# Patient Record
Sex: MALE | Race: WHITE | NOT HISPANIC OR LATINO | Employment: FULL TIME | URBAN - METROPOLITAN AREA
[De-identification: names, ages, dates, MRNs, and addresses within clinical notes are randomized per-mention and may not be internally consistent; named-entity substitution may affect disease eponyms.]

---

## 2018-06-05 ENCOUNTER — TELEPHONE (OUTPATIENT)
Dept: GASTROENTEROLOGY | Facility: CLINIC | Age: 48
End: 2018-06-05

## 2018-06-05 NOTE — TELEPHONE ENCOUNTER
----- Message from Sammi Schulte sent at 6/5/2018 11:33 AM CDT -----  Contact: pt can be reached at [phone]  Pt is calling to speak with the nurse to request an appt for Gastro.  Pt has side and stomach pain, once pt eat feel better then later pain.  Pt is having sharp pain.    Please give pt a call back      Thank you!

## 2018-06-06 ENCOUNTER — OFFICE VISIT (OUTPATIENT)
Dept: GASTROENTEROLOGY | Facility: CLINIC | Age: 48
End: 2018-06-06
Payer: COMMERCIAL

## 2018-06-06 VITALS
DIASTOLIC BLOOD PRESSURE: 85 MMHG | HEART RATE: 61 BPM | WEIGHT: 198.88 LBS | BODY MASS INDEX: 29.46 KG/M2 | SYSTOLIC BLOOD PRESSURE: 125 MMHG | HEIGHT: 69 IN

## 2018-06-06 DIAGNOSIS — R10.84 ABDOMINAL PAIN, GENERALIZED: Primary | ICD-10-CM

## 2018-06-06 DIAGNOSIS — R11.0 NAUSEA: ICD-10-CM

## 2018-06-06 PROCEDURE — 3008F BODY MASS INDEX DOCD: CPT | Mod: CPTII,S$GLB,, | Performed by: NURSE PRACTITIONER

## 2018-06-06 PROCEDURE — 99204 OFFICE O/P NEW MOD 45 MIN: CPT | Mod: S$GLB,,, | Performed by: NURSE PRACTITIONER

## 2018-06-06 PROCEDURE — 99999 PR PBB SHADOW E&M-EST. PATIENT-LVL III: CPT | Mod: PBBFAC,,, | Performed by: NURSE PRACTITIONER

## 2018-06-06 RX ORDER — PANTOPRAZOLE SODIUM 40 MG/1
TABLET, DELAYED RELEASE ORAL
Refills: 5 | COMMUNITY
Start: 2018-04-28 | End: 2018-11-13

## 2018-06-06 RX ORDER — DICYCLOMINE HYDROCHLORIDE 10 MG/1
10 CAPSULE ORAL
Qty: 90 CAPSULE | Refills: 3 | Status: SHIPPED | OUTPATIENT
Start: 2018-06-06 | End: 2018-11-13

## 2018-06-06 NOTE — PROGRESS NOTES
[facility] Note    Reason for Visit:  Gastroesophageal reflux    PCP: Latanya Heard     Referring MD: Kacey DAI [facility] [address] / [place] [address]    HPI:  This is a 47 y.o. male here for 2 nd opinon for abdominal pain.  [facility]  for the [facility]. Pt reports extensive testing unrevealing. No records to view.  Also seen in [facility] for abd pain by KAILEE Gallego in 2013. EGD and abd us were unrevealing at that time.     Abdominal pain   ONSET:2013  LOCATION:epigastric, right side  DURATION:daily; constant  CHARACTER:ache; sharp, bloating  ASSOCIATED/ALLEVIATING/AGGRAVAITING:+ nausea. No vomiting. No fevers. Sometimes worse after meals.  pantoprazole 40 mg daily lost efficacy. H/o of omeprazole 40 mg daily-lost efficacy  RADIATION:to various areas of abd  TEMPORAL:worse during immediately after meals  SEVERITY:usually 5/10. Can increase to 8/10 at times    Reflux-rare regur/pyrosis  Bowel habits - 1 bristol type 4 BM/day taking metamucil once daily x 1 year. BM few days weekly with straining if not taking metamucil.  GI bleeding - denies hematochezia, hematemesis, melena, BRBPR, black/tarry stools, and coffee ground emesis    ROS:  Constitutional: No fevers, no chills, No unintentional weight loss, + fatigue,   ENT: No allergies  CV: No chest pain, no palpitations, no perif. edema, no sob on exertion  Pulm: No cough, No shortness of breath, no wheezes, no sputum  Ophtho: No vision changes  GI: see HPI;   Derm: No rash  Heme: No lymphadenopathy, No bruising  MSK: No arthritis, no muscle pain, no muscle weakness  : No dysuria, No hematuria  Endo: No hot or cold intolerance  Neuro: No syncope, No seizure,     Medical History:  has a past medical history of GERD (gastroesophageal reflux disease).    Surgical History:  has a past surgical history that includes Cholecystectomy.    Family History: family history includes Heart disease in his father..     Social  "History:  reports that he has quit smoking. He quit smokeless tobacco use about 2 weeks ago. He reports that he drinks alcohol. He reports that he does not use drugs.    Review of patient's allergies indicates:  No Known Allergies    Current Outpatient Prescriptions   Medication Sig    pantoprazole (PROTONIX) 40 MG tablet TK 1 T PO QD    psyllium (METAMUCIL) powder Take 1 packet by mouth 3 (three) times daily.    co-enzyme Q-10 50 mg capsule Take 50 mg by mouth once daily.    dicyclomine (BENTYL) 10 MG capsule Take 1 capsule (10 mg total) by mouth before meals as needed (TID PRN).    fish oil-omega-3 fatty acids 300-1,000 mg capsule Take 2 g by mouth once daily.    multivitamin capsule Take 1 capsule by mouth once daily.     No current facility-administered medications for this visit.        Objective Findings:    Vital Signs:  /85   Pulse 61   Ht 5' 9" (1.753 m)   Wt 90.2 kg (198 lb 13.7 oz)   BMI 29.37 kg/m²   Body mass index is 29.37 kg/m².    Physical Exam:  General Appearance: Well appearing in no acute distress  Head:   Normocephalic, without obvious abnormality  Eyes:    No scleral icterus, EOMI  ENT: Neck supple, Lips, mucosa, and tongue normal; teeth and gums normal  Lungs: CTA bilaterally in anterior and posterior fields, no wheezes, no crackles.  Heart:  Regular rate and rhythm, S1, S2 normal, no murmurs heard  Abdomen: Soft,mild epigastric tenderness, non distended with positive bowel sounds in all four quadrants. No hepatosplenomegaly, ascites, or mass  Extremities: 2+ radial pulses, no clubbing, cyanosis or edema  Skin: No rash to exposed areas  Neurologic: A&Ox4      Labs:  Lab Results   Component Value Date    WBC 5.70 11/12/2013    HGB 15.2 11/12/2013    HCT 42.1 11/12/2013     11/12/2013    ALT 32 11/12/2013    AST 18 11/12/2013     11/12/2013    K 4.0 11/12/2013     11/12/2013    CREATININE 1.0 11/12/2013    BUN 16 11/12/2013    CO2 27 11/12/2013 "     Imaging:  abd us 11/12/13: fatty liver.    Endoscopy:    EGD 11/20/13: esophageal mucosal changes suspicious for EOE (-). Nl stomach, nl duodenum.   [facility]  Assessment:  1. Abdominal pain, generalized    2. Nausea           Recommendations:  1. abd pain-record release signed for previous GI work up. Start 6 small, frequent meals daily (GP diet) as per handout provided. Try Bentyl 10 mg TID PRN. Pt cautioned re potential s/e decreased sweating with bentyl use. Skip does if going outside for 1 hr or more. Understanding verbalized.   2. Nausea-recs as above.    To GI MD for 2 nd opinion.     Follow-up for 2 nd opinon abd pain with GI MD provider.      Order summary:  Orders Placed This Encounter    dicyclomine (BENTYL) 10 MG capsule         Thank you so much for allowing me to participate in the care of Lola Vicente, APRN,FNP-C

## 2018-06-06 NOTE — LETTER
June 6, 2018      Latanya Heard MD  [address]  [address]  [facility] [address]           [facility]  [address]  [place] [address]  Phone: [phone]  Fax: [phone]          Patient: Lola Stevens Jr.   MR Number: [id number]   YOB: 1970   Date of Visit: 6/6/2018       Dear Dr. Latanya Heard:    Thank you for referring Lola Stevens to me for evaluation. Attached you will find relevant portions of my assessment and plan of care.    If you have questions, please do not hesitate to call me. I look forward to following Lola Stevens along with you.    Sincerely,    Asha Vicente, KAILEE    Enclosure  CC:  No Recipients    If you would like to receive this communication electronically, please contact [email or web address] or [phone] to request more information on "[employer] Link access.    For providers and/or their staff who would like to refer a patient to [facility], please contact us through our one-stop-shop provider referral line, [facility] [place], at [phone].    If you feel you have received this communication in error or would no longer like to receive these types of communications, please e-mail [email or web address]

## 2018-06-08 ENCOUNTER — TELEPHONE (OUTPATIENT)
Dept: GASTROENTEROLOGY | Facility: CLINIC | Age: 48
End: 2018-06-08

## 2018-06-08 NOTE — TELEPHONE ENCOUNTER
----- Message from Janet Longoria MA sent at 6/8/2018  1:31 PM CDT -----  Contact: simone kraft ceci Monterroso [phone]  Asha Vicente's pt  ----- Message -----  From: Eda Char  Sent: 6/8/2018  12:19 PM  To: [facility] Clinical Staff    terri villarreal appt on 8/9 [facility] is waiting for you to request his records from [facility] signed release form here for you to ask for them - please call wife Loc kraft ceci Monterroso [phone]

## 2018-06-11 ENCOUNTER — TELEPHONE (OUTPATIENT)
Dept: GASTROENTEROLOGY | Facility: CLINIC | Age: 48
End: 2018-06-11

## 2018-06-11 NOTE — TELEPHONE ENCOUNTER
----- Message from Tanika Aguiar MA sent at 6/11/2018 11:10 AM CDT -----  Contact: Home: [phone]  Wife /Fartun  Needs Advice    Reason for call:  Pt received an apt confirmation to see Dr Ramírez on 8/9 but he was told an appt could not be made till  his records from dr salcedo at [facility] were received. Asking if his records were received.     Please contact Home: [phone]  Wife /Fartun

## 2018-06-12 ENCOUNTER — TELEPHONE (OUTPATIENT)
Dept: GASTROENTEROLOGY | Facility: CLINIC | Age: 48
End: 2018-06-12

## 2018-06-12 NOTE — TELEPHONE ENCOUNTER
----- Message from Betsy Ramírez MD sent at 6/11/2018  4:44 PM CDT -----  Contact: Home: [phone]  Wife /Fartun  Have then request them again  ----- Message -----  From: Sabina Barbosa MA  Sent: 6/11/2018   3:55 PM  To: Betsy Ramírez MD    I don't have any records.  ----- Message -----  From: Betsy Ramírez MD  Sent: 6/11/2018  12:10 PM  To: KALEY John    Please check if records have been received. I haven't gotten any records.    Betsy  ----- Message -----  From: Sabina Barbosa MA  Sent: 6/11/2018  11:21 AM  To: Betsy Ramírez MD        ----- Message -----  From: Tanika Aguiar MA  Sent: 6/11/2018  11:10 AM  To: [facility]rell Meyer Staff    Needs Advice    Reason for call:  Pt received an apt confirmation to see Dr Ramírez on 8/9 but he was told an appt could not be made till  his records from dr salcedo at [facility] were received. Asking if his records were received.     Please contact Home: [phone]  Wife /Fartun

## 2018-06-12 NOTE — TELEPHONE ENCOUNTER
Tried several times to reach patient. Phone was not accepting calls. Release for medical records faxed to Dr Alex Sandhu's office.

## 2018-06-13 ENCOUNTER — TELEPHONE (OUTPATIENT)
Dept: GASTROENTEROLOGY | Facility: CLINIC | Age: 48
End: 2018-06-13

## 2018-06-13 NOTE — TELEPHONE ENCOUNTER
----- Message from Betsy Ramírez MD sent at 6/13/2018 10:41 AM CDT -----  Contact: PT#[phone]  Sabina    I have never seen this man, I am not going to call him prior to his appointment to see what is going on and give a plan over the phone.    If a patient is new to me, I first need to see him.    We can make sure his appt is early so he can go to lab afterwards.    Betsy Ramírez M.D.  Gastroenterology Fellow, PGY-IV  Pager: [phone]  [facility]    ----- Message -----  From: Sabina Barbosa MA  Sent: 6/13/2018  10:37 AM  To: Betsy Ramírez MD        ----- Message -----  From: Dayan Srinivasan  Sent: 6/13/2018  10:30 AM  To: [facility]rell Meyer Staff    Needs Advice    Reason for call:    Pt wants to know if he can get labs done before appt. He states that labs from previ doctor is going to be a year old  Communication Preference:callback  Additional Information:

## 2018-06-13 NOTE — TELEPHONE ENCOUNTER
----- Message from Dayan Srinivasan sent at 6/13/2018 10:30 AM CDT -----  Contact: PT#[phone]  Needs Advice    Reason for call:    Pt wants to know if he can get labs done before appt. He states that labs from previ doctor is going to be a year old  Communication Preference:callback  Additional Information:

## 2018-06-26 ENCOUNTER — TELEPHONE (OUTPATIENT)
Dept: GASTROENTEROLOGY | Facility: CLINIC | Age: 48
End: 2018-06-26

## 2018-06-26 NOTE — TELEPHONE ENCOUNTER
----- Message from Curt Leon sent at 6/19/2018  9:50 AM CDT -----  Contact: Pt wife:[phone]  .Needs Advice    Reason for call:Pt wife called and states she would like to speak with the nurse in regards to the nurse giving her a call when the pt employer sends information by fax.  Communication Preference:Telephone  Additional Information:

## 2018-08-09 ENCOUNTER — OFFICE VISIT (OUTPATIENT)
Dept: GASTROENTEROLOGY | Facility: CLINIC | Age: 48
End: 2018-08-09
Payer: COMMERCIAL

## 2018-08-09 ENCOUNTER — LAB VISIT (OUTPATIENT)
Dept: LAB | Facility: HOSPITAL | Age: 48
End: 2018-08-09
Payer: COMMERCIAL

## 2018-08-09 VITALS
WEIGHT: 207.44 LBS | DIASTOLIC BLOOD PRESSURE: 82 MMHG | HEIGHT: 69 IN | TEMPERATURE: 64 F | SYSTOLIC BLOOD PRESSURE: 128 MMHG | BODY MASS INDEX: 30.72 KG/M2

## 2018-08-09 DIAGNOSIS — K21.9 GASTROESOPHAGEAL REFLUX DISEASE WITHOUT ESOPHAGITIS: ICD-10-CM

## 2018-08-09 DIAGNOSIS — R10.84 GENERALIZED ABDOMINAL PAIN: ICD-10-CM

## 2018-08-09 DIAGNOSIS — R10.84 GENERALIZED ABDOMINAL PAIN: Primary | ICD-10-CM

## 2018-08-09 DIAGNOSIS — R19.7 DIARRHEA, UNSPECIFIED TYPE: ICD-10-CM

## 2018-08-09 LAB
ALBUMIN SERPL BCP-MCNC: 4 G/DL
ALP SERPL-CCNC: 56 U/L
ALT SERPL W/O P-5'-P-CCNC: 42 U/L
ANION GAP SERPL CALC-SCNC: 9 MMOL/L
AST SERPL-CCNC: 26 U/L
BASOPHILS # BLD AUTO: 0.03 K/UL
BASOPHILS NFR BLD: 0.5 %
BILIRUB SERPL-MCNC: 0.4 MG/DL
BUN SERPL-MCNC: 22 MG/DL
CALCIUM SERPL-MCNC: 10.1 MG/DL
CHLORIDE SERPL-SCNC: 105 MMOL/L
CO2 SERPL-SCNC: 27 MMOL/L
CREAT SERPL-MCNC: 0.9 MG/DL
DIFFERENTIAL METHOD: NORMAL
EOSINOPHIL # BLD AUTO: 0.2 K/UL
EOSINOPHIL NFR BLD: 3 %
ERYTHROCYTE [DISTWIDTH] IN BLOOD BY AUTOMATED COUNT: 12.3 %
EST. GFR  (AFRICAN AMERICAN): >60 ML/MIN/1.73 M^2
EST. GFR  (NON AFRICAN AMERICAN): >60 ML/MIN/1.73 M^2
GLUCOSE SERPL-MCNC: 87 MG/DL
HCT VFR BLD AUTO: 45.5 %
HGB BLD-MCNC: 15.6 G/DL
IMM GRANULOCYTES # BLD AUTO: 0.01 K/UL
IMM GRANULOCYTES NFR BLD AUTO: 0.2 %
LYMPHOCYTES # BLD AUTO: 2.1 K/UL
LYMPHOCYTES NFR BLD: 34.2 %
MCH RBC QN AUTO: 30.2 PG
MCHC RBC AUTO-ENTMCNC: 34.3 G/DL
MCV RBC AUTO: 88 FL
MONOCYTES # BLD AUTO: 0.5 K/UL
MONOCYTES NFR BLD: 7.5 %
NEUTROPHILS # BLD AUTO: 3.4 K/UL
NEUTROPHILS NFR BLD: 54.6 %
NRBC BLD-RTO: 0 /100 WBC
PLATELET # BLD AUTO: 231 K/UL
PMV BLD AUTO: 9.6 FL
POTASSIUM SERPL-SCNC: 4 MMOL/L
PROT SERPL-MCNC: 7.3 G/DL
RBC # BLD AUTO: 5.17 M/UL
SODIUM SERPL-SCNC: 141 MMOL/L
WBC # BLD AUTO: 6.25 K/UL

## 2018-08-09 PROCEDURE — 3008F BODY MASS INDEX DOCD: CPT | Mod: CPTII,S$GLB,, | Performed by: STUDENT IN AN ORGANIZED HEALTH CARE EDUCATION/TRAINING PROGRAM

## 2018-08-09 PROCEDURE — 99999 PR PBB SHADOW E&M-EST. PATIENT-LVL III: CPT | Mod: PBBFAC,,, | Performed by: STUDENT IN AN ORGANIZED HEALTH CARE EDUCATION/TRAINING PROGRAM

## 2018-08-09 PROCEDURE — 99213 OFFICE O/P EST LOW 20 MIN: CPT | Mod: S$GLB,,, | Performed by: STUDENT IN AN ORGANIZED HEALTH CARE EDUCATION/TRAINING PROGRAM

## 2018-08-09 PROCEDURE — 85025 COMPLETE CBC W/AUTO DIFF WBC: CPT

## 2018-08-09 PROCEDURE — 36415 COLL VENOUS BLD VENIPUNCTURE: CPT

## 2018-08-09 PROCEDURE — 80053 COMPREHEN METABOLIC PANEL: CPT

## 2018-08-09 NOTE — PATIENT INSTRUCTIONS
--Obtain labs (CMP and CBC)    --Obtain EGD and colonoscopy    --Obtain CT abdomen and pelvis    --Continue PPI (you should take it ~ 45 minutes before breakfast)    --Follow up in 3 months    Betsy Ramírez M.D.  Gastroenterology Fellow, PGY-V  [facility]

## 2018-08-09 NOTE — PROGRESS NOTES
"     Gastroenterology Clinic    Reason for visit: The primary encounter diagnosis was Generalized abdominal pain. Diagnoses of Diarrhea, unspecified type and Gastroesophageal reflux disease without esophagitis were also pertinent to this visit.  Referring provider/PCP: Latanya Heard MD    HPI:  47 year old male ex-smoker with a history of GERD and abdominal pain who presents to clinic for a 2nd opinion as he feels his symptoms are getting worse/no diagnosis has been given.    Patient has been seen in our GI clinic by Ms. Asha Dhillon. He is new to me today.  He presents with his wife and has a notebook with 2 pages of complaints (GI complaints covered first and additional complaints in ROS).  Patient has had intermittent episodes of abdominal pain since 2013. He had a cholecystectomy however symptoms did not improve. He states that without warning he can have pain under his left rib, right rib, and mid-abdomen which he describes as stabbing/burning sensation associated with this "stomach blowing up". He also feels "gassy" but states he is not able to burp or pass flatus. Also complains of hunger pains which drive him to eat to see if pain resolves however upon eating gets full right away. Furthermore he states that when he has red sauce or pork he feels a knot in the epigastric region which is relieved with tums and H2 blocker. He is on a daily PPI which he takes with breakfast but he believes it is not helping as much. Lastly he states his "bowel movements are all over the place". One day he can have skinny light color stools and the next day he can have diarrhea. However here denies any melena or hematochezia.    No weight loss, no dysphagia, no odynophagia, no hematemesis.  No personal or family history of GI malignancy.  He works as a machinest which entails heaving pulling.    ROS:  Constitutional: no fever, chills or change in weight; positive fatigue and trouble concentrating   Eyes: "vision problems " "because I am getting old"  ENT: no sore throat or dysphagia  Respiratory: "problems with breathing"  Cardiovascular: no chest pain or palpitations   Gastrointestinal: refer to above  Hematologic/Lymphatic: no easy bruising or lymphadenopathy   Musculoskeletal: lower back pain and tingling in the lower extremities  Neurological: no change in mental status  Behavioral/Psych: no change in mood (no stress, anxiety, or major life events/stressors)    Summary of outside records (with most recent study first, will scan into Epic)  Labs 9/8/17  Amylase-30 (normal )  Lipase-139 (normal )  ALT-39, AST-20, ALP-53, Total bili-0.5  Hgb-15.6, Hct-43.4    XR upper GI with small bowel series 9/8/17  Moderate GERD, otherwise normal esophagus, stomach, and small bowel     CT abdomen/pelvis with contrast 11/2015  Several prominent mesenteric lymph nodes in the right lower quadrant of the abdomen, although non-specific this can be associated with mesenteric adenitis  Previous sukhjinder  Small pulmonary nodule    Colonoscopy 11/20/15  Cecum reached, good prep  Internal hemorrhoids  Path:  Ascending colon-colonic mucosa with no pathology  Sigmoid colon-colonic mucosa with mild non-specific chronic inflammation    EGD 7/31/15  Normal esophagus  Erythema/granularity in the antrum compatible with erosive gastropathy  Normal duodenum  Pathology:  Stomach (antrum)-mild chronic gastritis with focal reactive change, no HP  Duodenal bulb-small intestinal mucosa with mild non-specific chronic inflammation  Second part of duodenum-small intestinal mucosa with no pathology    Lap sukhjinder 6/2014  Path-mild chronic cholecystitis with benign LN    MRCP 6/3/14  Normal intrahepatic/extrahepatic biliary radicals with normal appearing common bile duct. No evidence of biliary obstruction or luminal mass    Review of Systems (please refer to HPI):    Past Medical History:   Diagnosis Date    GERD (gastroesophageal reflux disease)        Past Surgical " "History:   Procedure Laterality Date    CHOLECYSTECTOMY         Family History   Problem Relation Age of Onset    Heart disease Father     Colon cancer Neg Hx     Esophageal cancer Neg Hx     Stomach cancer Neg Hx     Ulcerative colitis Neg Hx     Crohn's disease Neg Hx     Celiac disease Neg Hx     Irritable bowel syndrome Neg Hx        Social History     Social History    Marital status:      Spouse name: N/A    Number of children: N/A    Years of education: N/A     Occupational History    Not on file.     Social History Main Topics    Smoking status: Former Smoker    Smokeless tobacco: Former User     Quit date: 5/23/2018      Comment: quit smoking 15 years ago    Alcohol use Yes      Comment: on weekends    Drug use: No    Sexual activity: Not on file     Other Topics Concern    Not on file     Social History Narrative    No narrative on file       Current Outpatient Prescriptions on File Prior to Visit   Medication Sig Dispense Refill    pantoprazole (PROTONIX) 40 MG tablet TK 1 T PO QD  5    dicyclomine (BENTYL) 10 MG capsule Take 1 capsule (10 mg total) by mouth before meals as needed (TID PRN). 90 capsule 3     No current facility-administered medications on file prior to visit.        Review of patient's allergies indicates:  No Known Allergies    Physical Exam:  /82   Temp (!) 64 °F (17.8 °C)   Ht 5' 9" (1.753 m)   Wt 94.1 kg (207 lb 7.3 oz)   BMI 30.64 kg/m²    General appearance: alert, appears stated age and cooperative patient comfortable in no distress, on room air  Lungs: clear to auscultation bilaterally  Chest wall: no tenderness  Heart: regular rate and rhythm, S1, S2 normal, no murmur, click, rub or gallop  Abdomen: Well healed lap incision with "soreness" in the epigastric region  Extremities: extremities normal, atraumatic, no cyanosis or edema  Pulses: 2+ and symmetric    Laboratory:  Lab Results   Component Value Date    WBC 6.25 08/09/2018    HGB 15.6 " 08/09/2018    HCT 45.5 08/09/2018    MCV 88 08/09/2018     08/09/2018     CMP  Sodium   Date Value Ref Range Status   08/09/2018 141 136 - 145 mmol/L Final     Potassium   Date Value Ref Range Status   08/09/2018 4.0 3.5 - 5.1 mmol/L Final     Chloride   Date Value Ref Range Status   08/09/2018 105 95 - 110 mmol/L Final     CO2   Date Value Ref Range Status   08/09/2018 27 23 - 29 mmol/L Final     Glucose   Date Value Ref Range Status   08/09/2018 87 70 - 110 mg/dL Final     BUN, Bld   Date Value Ref Range Status   08/09/2018 22 (H) 6 - 20 mg/dL Final     Creatinine   Date Value Ref Range Status   08/09/2018 0.9 0.5 - 1.4 mg/dL Final     Calcium   Date Value Ref Range Status   08/09/2018 10.1 8.7 - 10.5 mg/dL Final     Total Protein   Date Value Ref Range Status   08/09/2018 7.3 6.0 - 8.4 g/dL Final     Albumin   Date Value Ref Range Status   08/09/2018 4.0 3.5 - 5.2 g/dL Final     Total Bilirubin   Date Value Ref Range Status   08/09/2018 0.4 0.1 - 1.0 mg/dL Final     Comment:     For infants and newborns, interpretation of results should be based  on gestational age, weight and in agreement with clinical  observations.  Premature Infant recommended reference ranges:  Up to 24 hours.............<8.0 mg/dL  Up to 48 hours............<12.0 mg/dL  3-5 days..................<15.0 mg/dL  6-29 days.................<15.0 mg/dL       Alkaline Phosphatase   Date Value Ref Range Status   08/09/2018 56 55 - 135 U/L Final     AST   Date Value Ref Range Status   08/09/2018 26 10 - 40 U/L Final     ALT   Date Value Ref Range Status   08/09/2018 42 10 - 44 U/L Final     Anion Gap   Date Value Ref Range Status   08/09/2018 9 8 - 16 mmol/L Final     eGFR if    Date Value Ref Range Status   08/09/2018 >60.0 >60 mL/min/1.73 m^2 Final     eGFR if non    Date Value Ref Range Status   08/09/2018 >60.0 >60 mL/min/1.73 m^2 Final     Comment:     Calculation used to obtain the estimated glomerular  "filtration  rate (eGFR) is the CKD-EPI equation.        Assessment:  47 year old male ex-smoker with a history of GERD and abdominal pain who presents to clinic for a 2nd opinion as he feels his symptoms are getting worse/no diagnosis has been given.    Plan:  Abdominal pain and changes in bowel habits  GERD without esophagitis  Patient complaints date back to 2013 and during our visit today he had multiple complaints. He reported wanted a second opinion as his symptoms were getting worse and no physician had given him a diagnosis. First of all when speaking to him he reported felling some improvement since decreasing his fiber intake. He also denied any dysphagia, odynophagia, hematemesis, melena, hematochezia, or weight loss. Then on exam although I extensively examined his "problem areas" all I could reproduce was some soreness in the epigastric region. Furthermore I had a change to review outside records which were all pretty much unremarkable except for a colon biopsy which reported some inflammation (unsure if clinically significant) and a CT scan which showed enlarged LN in the RLQ. Based on history, exam, and outside labs patient's complaints seem to be functional in nature. Not to mention that basic labs done here are also within normal limits. However he is an older white male who use to smoke/dip tobacco and based on prior colon/CT we would like to repeat our own EGD/colon/CT to rule out BE, PUD, colonic polyps, and make sure prior LN have resolved. If studies are unrevealing patient will likely benefit from pro-biotics, IB-amber or FD-amber, etc.  Of note patient expressed concern as he had a friend quickly pass away from pancreatic cancer and he wants to make sure the same does not happen to him. He did not mention this until visit was done and he was getting ready to leave.  --Avoid fiber as it has helped his symptoms  --Continue PPI (instructed patient on how to take it correctly)  --EGD and " colonoscopy  --CT abdomen and pelvis with contrast  --Follow up in 3 months    For the following complaints: Fatigue/tired, hard to concentrate, problems with breathing/vision, back pain, tingling in legs, white lines along finger nails  --Please establish care with a primary care doctor (although one is listed in Epic per patient he does not have a PCP)    Betsy Ramírez M.D.  Gastroenterology Fellow, PGY-V  Pager: [phone]  [facility]

## 2018-08-14 ENCOUNTER — TELEPHONE (OUTPATIENT)
Dept: GASTROENTEROLOGY | Facility: CLINIC | Age: 48
End: 2018-08-14

## 2018-08-14 NOTE — TELEPHONE ENCOUNTER
----- Message from Betsy Ramírez MD sent at 8/10/2018 11:11 AM CDT -----  Labs within normal limits.    Sabina-please call Mr. Stevens and let him know his labs look OK. From our standpoint the plan still stand (EGD, colon, and CT scan) and for all additional complaints we strongly recommend that he establish care with a PCP.    Betsy Ramírez M.D.  Gastroenterology Fellow, PGY-V  [facility]

## 2018-09-21 ENCOUNTER — HOSPITAL ENCOUNTER (OUTPATIENT)
Dept: RADIOLOGY | Facility: HOSPITAL | Age: 48
Discharge: HOME | End: 2018-09-21
Attending: STUDENT IN AN ORGANIZED HEALTH CARE EDUCATION/TRAINING PROGRAM
Payer: COMMERCIAL

## 2018-09-21 DIAGNOSIS — R10.84 GENERALIZED ABDOMINAL PAIN: ICD-10-CM

## 2018-09-21 DIAGNOSIS — R19.7 DIARRHEA, UNSPECIFIED TYPE: ICD-10-CM

## 2018-09-21 PROCEDURE — 74177 CT ABD & PELVIS W/CONTRAST: CPT | Mod: 26,,, | Performed by: RADIOLOGY

## 2018-09-21 PROCEDURE — 74177 CT ABD & PELVIS W/CONTRAST: CPT | Mod: TC

## 2018-09-21 PROCEDURE — 25500020 PHARM REV CODE 255: Performed by: STUDENT IN AN ORGANIZED HEALTH CARE EDUCATION/TRAINING PROGRAM

## 2018-09-21 RX ADMIN — IOHEXOL 15 ML: 350 INJECTION, SOLUTION INTRAVENOUS at 11:09

## 2018-09-21 RX ADMIN — IOHEXOL 100 ML: 350 INJECTION, SOLUTION INTRAVENOUS at 12:09

## 2018-09-24 ENCOUNTER — TELEPHONE (OUTPATIENT)
Dept: GASTROENTEROLOGY | Facility: CLINIC | Age: 48
End: 2018-09-24

## 2018-09-24 ENCOUNTER — TELEPHONE (OUTPATIENT)
Dept: ENDOSCOPY | Facility: HOSPITAL | Age: 48
End: 2018-09-24

## 2018-09-24 DIAGNOSIS — Z12.11 SPECIAL SCREENING FOR MALIGNANT NEOPLASMS, COLON: Primary | ICD-10-CM

## 2018-09-24 DIAGNOSIS — R91.1 LUNG NODULE: ICD-10-CM

## 2018-09-24 RX ORDER — SODIUM, POTASSIUM,MAG SULFATES 17.5-3.13G
SOLUTION, RECONSTITUTED, ORAL ORAL
Qty: 354 ML | Refills: 0 | Status: SHIPPED | OUTPATIENT
Start: 2018-09-24 | End: 2018-10-15

## 2018-09-24 NOTE — TELEPHONE ENCOUNTER
Ma spoke with pt test results given pt verbalized understanding , appt scheduled for 8/19 at 9am at the [facility] for ct letter mailed

## 2018-09-24 NOTE — TELEPHONE ENCOUNTER
----- Message from Be Durant MD sent at 9/24/2018 11:35 AM CDT -----  Please notify patient, the CT scan looks fine. Will schedule a CT of the chest next year to follow up on two small lung nodules (ordered).

## 2018-10-05 ENCOUNTER — TELEPHONE (OUTPATIENT)
Dept: GASTROENTEROLOGY | Facility: CLINIC | Age: 48
End: 2018-10-05

## 2018-10-05 NOTE — TELEPHONE ENCOUNTER
----- Message from Betsy Ramírez MD sent at 10/5/2018  3:01 PM CDT -----  Nguyễn Fields    I am almost positive that you spoke to this person but wanted to confirm.    If not I will call him  Let me know  ----- Message -----  From: Be Durant MD  Sent: 9/24/2018  11:35 AM  To: Betsy Ramírez MD, [facility] Staff    Please notify patient, the CT scan looks fine. Will schedule a CT of the chest next year to follow up on two small lung nodules (ordered).

## 2018-10-15 DIAGNOSIS — R19.7 DIARRHEA, UNSPECIFIED TYPE: Primary | ICD-10-CM

## 2018-10-15 RX ORDER — SODIUM, POTASSIUM,MAG SULFATES 17.5-3.13G
SOLUTION, RECONSTITUTED, ORAL ORAL
Qty: 1 KIT | Refills: 0 | Status: ON HOLD | OUTPATIENT
Start: 2018-10-15 | End: 2018-10-17 | Stop reason: ALTCHOICE

## 2018-10-17 ENCOUNTER — HOSPITAL ENCOUNTER (OUTPATIENT)
Facility: HOSPITAL | Age: 48
Discharge: HOME | End: 2018-10-17
Attending: INTERNAL MEDICINE | Admitting: INTERNAL MEDICINE
Payer: COMMERCIAL

## 2018-10-17 ENCOUNTER — ANESTHESIA (OUTPATIENT)
Dept: ENDOSCOPY | Facility: HOSPITAL | Age: 48
End: 2018-10-17
Payer: COMMERCIAL

## 2018-10-17 ENCOUNTER — ANESTHESIA EVENT (OUTPATIENT)
Dept: ENDOSCOPY | Facility: HOSPITAL | Age: 48
End: 2018-10-17
Payer: COMMERCIAL

## 2018-10-17 VITALS
TEMPERATURE: 98 F | HEART RATE: 79 BPM | DIASTOLIC BLOOD PRESSURE: 80 MMHG | OXYGEN SATURATION: 99 % | RESPIRATION RATE: 17 BRPM | HEIGHT: 69 IN | BODY MASS INDEX: 29.62 KG/M2 | WEIGHT: 200 LBS | SYSTOLIC BLOOD PRESSURE: 128 MMHG

## 2018-10-17 DIAGNOSIS — R11.0 NAUSEA: ICD-10-CM

## 2018-10-17 DIAGNOSIS — R10.9 ABDOMINAL PAIN, UNSPECIFIED ABDOMINAL LOCATION: Primary | ICD-10-CM

## 2018-10-17 DIAGNOSIS — R10.84 GENERALIZED ABDOMINAL PAIN: ICD-10-CM

## 2018-10-17 PROCEDURE — 25000003 PHARM REV CODE 250: Performed by: INTERNAL MEDICINE

## 2018-10-17 PROCEDURE — 43239 EGD BIOPSY SINGLE/MULTIPLE: CPT | Performed by: INTERNAL MEDICINE

## 2018-10-17 PROCEDURE — 37000008 HC ANESTHESIA 1ST 15 MINUTES: Performed by: INTERNAL MEDICINE

## 2018-10-17 PROCEDURE — 45378 DIAGNOSTIC COLONOSCOPY: CPT | Mod: ,,, | Performed by: INTERNAL MEDICINE

## 2018-10-17 PROCEDURE — 88305 TISSUE EXAM BY PATHOLOGIST: CPT | Performed by: PATHOLOGY

## 2018-10-17 PROCEDURE — 25000003 PHARM REV CODE 250: Performed by: NURSE ANESTHETIST, CERTIFIED REGISTERED

## 2018-10-17 PROCEDURE — 27201012 HC FORCEPS, HOT/COLD, DISP: Performed by: INTERNAL MEDICINE

## 2018-10-17 PROCEDURE — E9220 PRA ENDO ANESTHESIA: HCPCS | Mod: ,,, | Performed by: NURSE ANESTHETIST, CERTIFIED REGISTERED

## 2018-10-17 PROCEDURE — 88305 TISSUE EXAM BY PATHOLOGIST: CPT | Mod: 26,,, | Performed by: PATHOLOGY

## 2018-10-17 PROCEDURE — 45378 DIAGNOSTIC COLONOSCOPY: CPT | Performed by: INTERNAL MEDICINE

## 2018-10-17 PROCEDURE — 37000009 HC ANESTHESIA EA ADD 15 MINS: Performed by: INTERNAL MEDICINE

## 2018-10-17 PROCEDURE — 43239 EGD BIOPSY SINGLE/MULTIPLE: CPT | Mod: 51,,, | Performed by: INTERNAL MEDICINE

## 2018-10-17 PROCEDURE — 63600175 PHARM REV CODE 636 W HCPCS: Performed by: NURSE ANESTHETIST, CERTIFIED REGISTERED

## 2018-10-17 RX ORDER — SODIUM CHLORIDE 9 MG/ML
INJECTION, SOLUTION INTRAVENOUS CONTINUOUS
Status: DISCONTINUED | OUTPATIENT
Start: 2018-10-17 | End: 2018-10-17 | Stop reason: HOSPADM

## 2018-10-17 RX ORDER — SODIUM CHLORIDE 9 MG/ML
INJECTION, SOLUTION INTRAVENOUS CONTINUOUS
Status: DISCONTINUED | OUTPATIENT
Start: 2018-10-17 | End: 2018-10-17

## 2018-10-17 RX ORDER — SODIUM CHLORIDE 0.9 % (FLUSH) 0.9 %
3 SYRINGE (ML) INJECTION
Status: DISCONTINUED | OUTPATIENT
Start: 2018-10-17 | End: 2018-10-17 | Stop reason: HOSPADM

## 2018-10-17 RX ORDER — GLYCOPYRROLATE 0.2 MG/ML
INJECTION INTRAMUSCULAR; INTRAVENOUS
Status: DISCONTINUED | OUTPATIENT
Start: 2018-10-17 | End: 2018-10-17

## 2018-10-17 RX ORDER — PROPOFOL 10 MG/ML
VIAL (ML) INTRAVENOUS CONTINUOUS PRN
Status: DISCONTINUED | OUTPATIENT
Start: 2018-10-17 | End: 2018-10-17

## 2018-10-17 RX ORDER — LIDOCAINE HCL/PF 100 MG/5ML
SYRINGE (ML) INTRAVENOUS
Status: DISCONTINUED | OUTPATIENT
Start: 2018-10-17 | End: 2018-10-17

## 2018-10-17 RX ORDER — PROPOFOL 10 MG/ML
VIAL (ML) INTRAVENOUS
Status: DISCONTINUED | OUTPATIENT
Start: 2018-10-17 | End: 2018-10-17

## 2018-10-17 RX ADMIN — LIDOCAINE HYDROCHLORIDE 20 MG: 20 INJECTION, SOLUTION INTRAVENOUS at 08:10

## 2018-10-17 RX ADMIN — SODIUM CHLORIDE: 0.9 INJECTION, SOLUTION INTRAVENOUS at 08:10

## 2018-10-17 RX ADMIN — PROPOFOL 30 MG: 10 INJECTION, EMULSION INTRAVENOUS at 08:10

## 2018-10-17 RX ADMIN — GLYCOPYRROLATE 0.2 MG: 0.2 INJECTION, SOLUTION INTRAMUSCULAR; INTRAVENOUS at 08:10

## 2018-10-17 RX ADMIN — PROPOFOL 20 MG: 10 INJECTION, EMULSION INTRAVENOUS at 08:10

## 2018-10-17 RX ADMIN — PROPOFOL 200 MCG/KG/MIN: 10 INJECTION, EMULSION INTRAVENOUS at 08:10

## 2018-10-17 RX ADMIN — PROPOFOL 40 MG: 10 INJECTION, EMULSION INTRAVENOUS at 08:10

## 2018-10-17 NOTE — [employer]ATION PATIENT INSTRUCTIONS
Discharge Summary/Instructions after an Endoscopic Procedure  Patient Name: Lola Stevens  Patient MRN: [id number]  Patient YOB: 1970  Wednesday, October 17, 2018  Cinda Davey MD  RESTRICTIONS:  During your procedure today, you received medications for sedation.  These   medications may affect your judgment, balance and coordination.  Therefore,   for 24 hours, you have the following restrictions:   - DO NOT drive a car, operate machinery, make legal/financial decisions,   sign important papers or drink alcohol.    ACTIVITY:  Today: no heavy lifting, straining or running due to procedural   sedation/anesthesia.  The following day: return to full activity including work.  DIET:  Eat and drink normally unless instructed otherwise.     TREATMENT FOR COMMON SIDE EFFECTS:  - Mild abdominal pain, nausea, belching, bloating or excessive gas:  rest,   eat lightly and use a heating pad.  - Sore Throat: treat with throat lozenges and/or gargle with warm salt   water.  - Because air was used during the procedure, expelling large amounts of air   from your rectum or belching is normal.  - If a bowel prep was taken, you may not have a bowel movement for 1-3 days.    This is normal.  SYMPTOMS TO WATCH FOR AND REPORT TO YOUR PHYSICIAN:  1. Abdominal pain or bloating, other than gas cramps.  2. Chest pain.  3. Back pain.  4. Signs of infection such as: chills or fever occurring within 24 hours   after the procedure.  5. Rectal bleeding, which would show as bright red, maroon, or black stools.   (A tablespoon of blood from the rectum is not serious, especially if   hemorrhoids are present.)  6. Vomiting.  7. Weakness or dizziness.  GO DIRECTLY TO THE NEAREST EMERGENCY ROOM IF YOU HAVE ANY OF THE FOLLOWING:      Difficulty breathing              Chills and/or fever over 101 F   Persistent vomiting and/or vomiting blood   Severe abdominal pain   Severe chest pain   Black, tarry stools   Bleeding- more than one  tablespoon   Any other symptom or condition that you feel may need urgent attention  Your doctor recommends these additional instructions:  If any biopsies were taken, your doctors clinic will contact you in 1 to 2   weeks with any results.  - Discharge patient to home.   - Await pathology results.   - Patient has a contact number available for emergencies.  The signs and   symptoms of potential delayed complications were discussed with the   patient.  Return to normal activities tomorrow.  Written discharge   instructions were provided to the patient.   - Resume previous diet.   - Continue present medications.   - Return to referring physician.   For questions, problems or results please call your physician - Cinda Davey MD at Work:  [phone].  [facility], EMERGENCY ROOM PHONE NUMBER: [phone]  IF A COMPLICATION OR EMERGENCY SITUATION ARISES AND YOU ARE UNABLE TO REACH   YOUR PHYSICIAN - GO DIRECTLY TO THE EMERGENCY ROOM.  Cinda Davey MD  10/17/2018 9:06:24 AM  This report has been verified and signed electronically.  [employer]

## 2018-10-17 NOTE — H&P
Short Stay Endoscopy History and Physical    PCP - Latnaya Heard MD  Referring Physician - Betsy Ramírez MD  [address] [place] [address]    Procedure - EGD and Colonoscopy  ASA - per anesthesia  Mallampati - per anesthesia  History of Anesthesia problems - no  Family history Anesthesia problems -  no   Plan of anesthesia - General    HPI  47 y.o. male    Reason for procedure: generalized abdominal pain and diarrhea    ROS:  Constitutional: No fevers, chills, No weight loss  CV: No chest pain  Pulm: No cough, No shortness of breath  GI: see HPI    Medical History:  has a past medical history of Diverticulosis, GERD (gastroesophageal reflux disease), and Hemorrhoids.    Surgical History:  has a past surgical history that includes Cholecystectomy; Esophagogastroduodenoscopy; Colonoscopy; and EGD (ESOPHAGOGASTRODUODENOSCOPY) (N/A, 11/20/2013).    Family History: family history includes Heart disease in his father., see HPI    Social History:  reports that he quit smoking about 16 years ago. His smoking use included cigarettes. He has a 15.00 pack-year smoking history. He quit smokeless tobacco use about 4 months ago. He reports that he drinks alcohol. He reports that he does not use drugs.    Review of patient's allergies indicates:  No Known Allergies    Medications:   Medications Prior to Admission   Medication Sig Dispense Refill Last Dose    dicyclomine (BENTYL) 10 MG capsule Take 1 capsule (10 mg total) by mouth before meals as needed (TID PRN). 90 capsule 3 Past Month at Unknown time    pantoprazole (PROTONIX) 40 MG tablet TK 1 T PO QD  5 Past Month at Unknown time       Physical Exam:    Vital Signs:   Vitals:    10/17/18 0829   BP: (!) 154/94   Pulse: 90   Resp: 16   Temp: 97.9 °F (36.6 °C)       General Appearance: Well appearing in no acute distress  Abdomen: Soft, non tender, non distended with normal bowel sounds, no masses    Labs:  Lab Results   Component Value Date    WBC 6.25  08/09/2018    HGB 15.6 08/09/2018    HCT 45.5 08/09/2018     08/09/2018    ALT 42 08/09/2018    AST 26 08/09/2018     08/09/2018    K 4.0 08/09/2018     08/09/2018    CREATININE 0.9 08/09/2018    BUN 22 (H) 08/09/2018    CO2 27 08/09/2018       I have explained the risks and benefits of this endoscopic procedure to the patient including but not limited to bleeding, inflammation, infection, perforation, and death.      Cinda Davey MD

## 2018-10-17 NOTE — TRANSFER OF CARE
"Anesthesia Transfer of Care Note    Patient: Lola Stevens Jr.    Procedure(s) Performed: Procedure(s) (LRB):  EGD (ESOPHAGOGASTRODUODENOSCOPY) (N/A)  COLONOSCOPY (N/A)    Patient location: PACU    Anesthesia Type: general    Transport from OR: Transported from OR on room air with adequate spontaneous ventilation    Post pain: adequate analgesia    Post assessment: no apparent anesthetic complications and tolerated procedure well    Post vital signs: stable    Level of consciousness: sedated    Nausea/Vomiting: no nausea/vomiting    Complications: none    Transfer of care protocol was followed      Last vitals: 10/17/18 0928  Visit Vitals  BP (!) 154/94 (BP Location: Left arm, Patient Position: Lying)   Pulse 90   Temp 36.6 °C (97.9 °F) (Temporal)   Resp 16   Ht 5' 9" (1.753 m)   Wt 90.7 kg (200 lb)   SpO2 97%   BMI 29.53 kg/m²     "

## 2018-10-17 NOTE — PROVATION PATIENT INSTRUCTIONS
Discharge Summary/Instructions after an Endoscopic Procedure  Patient Name: Lola Stevens  Patient MRN: [id number]  Patient YOB: 1970  Wednesday, October 17, 2018  Cinda Davey MD  RESTRICTIONS:  During your procedure today, you received medications for sedation.  These   medications may affect your judgment, balance and coordination.  Therefore,   for 24 hours, you have the following restrictions:   - DO NOT drive a car, operate machinery, make legal/financial decisions,   sign important papers or drink alcohol.    ACTIVITY:  Today: no heavy lifting, straining or running due to procedural   sedation/anesthesia.  The following day: return to full activity including work.  DIET:  Eat and drink normally unless instructed otherwise.     TREATMENT FOR COMMON SIDE EFFECTS:  - Mild abdominal pain, nausea, belching, bloating or excessive gas:  rest,   eat lightly and use a heating pad.  - Sore Throat: treat with throat lozenges and/or gargle with warm salt   water.  - Because air was used during the procedure, expelling large amounts of air   from your rectum or belching is normal.  - If a bowel prep was taken, you may not have a bowel movement for 1-3 days.    This is normal.  SYMPTOMS TO WATCH FOR AND REPORT TO YOUR PHYSICIAN:  1. Abdominal pain or bloating, other than gas cramps.  2. Chest pain.  3. Back pain.  4. Signs of infection such as: chills or fever occurring within 24 hours   after the procedure.  5. Rectal bleeding, which would show as bright red, maroon, or black stools.   (A tablespoon of blood from the rectum is not serious, especially if   hemorrhoids are present.)  6. Vomiting.  7. Weakness or dizziness.  GO DIRECTLY TO THE NEAREST EMERGENCY ROOM IF YOU HAVE ANY OF THE FOLLOWING:      Difficulty breathing              Chills and/or fever over 101 F   Persistent vomiting and/or vomiting blood   Severe abdominal pain   Severe chest pain   Black, tarry stools   Bleeding- more than one  tablespoon   Any other symptom or condition that you feel may need urgent attention  Your doctor recommends these additional instructions:  If any biopsies were taken, your doctors clinic will contact you in 1 to 2   weeks with any results.  - Discharge patient to home.   - Patient has a contact number available for emergencies.  The signs and   symptoms of potential delayed complications were discussed with the   patient.  Return to normal activities tomorrow.  Written discharge   instructions were provided to the patient.   - Resume previous diet.   - Continue present medications.   - Await pathology results.   - Return to referring physician.   - Repeat colonoscopy in 10 years for screening purposes.   For questions, problems or results please call your physician - Cinda Davey MD at Work:  [phone].  [facility], EMERGENCY ROOM PHONE NUMBER: [phone]  IF A COMPLICATION OR EMERGENCY SITUATION ARISES AND YOU ARE UNABLE TO REACH   YOUR PHYSICIAN - GO DIRECTLY TO THE EMERGENCY ROOM.  Cinda Davey MD  10/17/2018 9:27:06 AM  This report has been verified and signed electronically.  [employer]ATION

## 2018-10-17 NOTE — ANESTHESIA PREPROCEDURE EVALUATION
10/17/2018  Lola Stevens Jr. is a 47 y.o., male.    Anesthesia Evaluation    I have reviewed the Patient Summary Reports.     I have reviewed the Medications.     Review of Systems  Anesthesia Hx:  No problems with previous Anesthesia  Denies Family Hx of Anesthesia complications.   Denies Personal Hx of Anesthesia complications.   Hematology/Oncology:  Hematology Normal   Oncology Normal     EENT/Dental:EENT/Dental Normal   Cardiovascular:  Cardiovascular Normal     Pulmonary:  Pulmonary Normal    Renal/:  Renal/ Normal     Hepatic/GI:   GERD    Musculoskeletal:  Musculoskeletal Normal    Neurological:  Neurology Normal    Endocrine:  Endocrine Normal    Dermatological:  Skin Normal    Psych:  Psychiatric Normal           Physical Exam  General:  Well nourished      Dental:  Dental Findings: In tact    Chest/Lungs:  Chest/Lungs Clear    Heart/Vascular:  Heart Findings: Normal Heart murmur: negative            Anesthesia Plan  Type of Anesthesia, risks & benefits discussed:  Anesthesia Type:  general  Patient's Preference:   Intra-op Monitoring Plan: standard ASA monitors  Intra-op Monitoring Plan Comments:   Post Op Pain Control Plan: per primary service following discharge from PACU  Post Op Pain Control Plan Comments:   Induction:   IV  Beta Blocker:         Informed Consent: Patient understands risks and agrees with Anesthesia plan.  Questions answered. Anesthesia consent signed with patient.  ASA Score: 1     Day of Surgery Review of History & Physical: I have interviewed and examined the patient. I have reviewed the patient's H&P dated:  There are no significant changes.  H&P update referred to the surgeon.         Ready For Surgery From Anesthesia Perspective.

## 2018-10-17 NOTE — ANESTHESIA POSTPROCEDURE EVALUATION
"Anesthesia Post Evaluation    Patient: Lola Stevens Jr.    Procedure(s) Performed: Procedure(s) (LRB):  EGD (ESOPHAGOGASTRODUODENOSCOPY) (N/A)  COLONOSCOPY (N/A)    Final Anesthesia Type: general  Patient location during evaluation: PACU  Patient participation: Yes- Able to Participate  Level of consciousness: awake and alert  Post-procedure vital signs: reviewed and stable  Pain management: adequate  Airway patency: patent  PONV status at discharge: No PONV  Anesthetic complications: no      Cardiovascular status: blood pressure returned to baseline  Respiratory status: unassisted  Hydration status: euvolemic  Follow-up not needed.        Visit Vitals  /80 (BP Location: Left arm, Patient Position: Lying)   Pulse 79   Temp 36.6 °C (97.9 °F) (Temporal)   Resp 17   Ht 5' 9" (1.753 m)   Wt 90.7 kg (200 lb)   SpO2 99%   BMI 29.53 kg/m²       Pain/Abrahan Score: Pain Assessment Performed: Yes (10/17/2018 10:02 AM)  Presence of Pain: denies (10/17/2018 10:02 AM)  Pain Rating Prior to Med Admin: 0 (10/17/2018 10:02 AM)  Abrahan Score: 10 (10/17/2018 10:02 AM)        "

## 2018-10-23 ENCOUNTER — OFFICE VISIT (OUTPATIENT)
Dept: PRIMARY CARE CLINIC | Facility: CLINIC | Age: 48
End: 2018-10-23
Payer: COMMERCIAL

## 2018-10-23 VITALS
BODY MASS INDEX: 30.11 KG/M2 | OXYGEN SATURATION: 98 % | TEMPERATURE: 99 F | SYSTOLIC BLOOD PRESSURE: 131 MMHG | HEIGHT: 69 IN | DIASTOLIC BLOOD PRESSURE: 80 MMHG | WEIGHT: 203.31 LBS | HEART RATE: 80 BPM | RESPIRATION RATE: 18 BRPM

## 2018-10-23 DIAGNOSIS — J01.90 ACUTE NON-RECURRENT SINUSITIS, UNSPECIFIED LOCATION: Primary | ICD-10-CM

## 2018-10-23 DIAGNOSIS — J40 BRONCHITIS: ICD-10-CM

## 2018-10-23 PROCEDURE — 96372 THER/PROPH/DIAG INJ SC/IM: CPT | Mod: S$GLB,,, | Performed by: INTERNAL MEDICINE

## 2018-10-23 PROCEDURE — 99999 PR PBB SHADOW E&M-EST. PATIENT-LVL III: CPT | Mod: PBBFAC,,, | Performed by: INTERNAL MEDICINE

## 2018-10-23 PROCEDURE — 99213 OFFICE O/P EST LOW 20 MIN: CPT | Mod: 25,S$GLB,, | Performed by: INTERNAL MEDICINE

## 2018-10-23 PROCEDURE — 3008F BODY MASS INDEX DOCD: CPT | Mod: CPTII,S$GLB,, | Performed by: INTERNAL MEDICINE

## 2018-10-23 RX ORDER — AZITHROMYCIN 250 MG/1
TABLET, FILM COATED ORAL
Qty: 6 TABLET | Refills: 0 | Status: SHIPPED | OUTPATIENT
Start: 2018-10-23 | End: 2018-10-27

## 2018-10-23 RX ORDER — ALBUTEROL SULFATE 90 UG/1
2 AEROSOL, METERED RESPIRATORY (INHALATION) EVERY 4 HOURS PRN
Qty: 1 INHALER | Refills: 1 | Status: SHIPPED | OUTPATIENT
Start: 2018-10-23 | End: 2019-01-04 | Stop reason: ALTCHOICE

## 2018-10-23 RX ORDER — CEFTRIAXONE 1 G/1
1 INJECTION, POWDER, FOR SOLUTION INTRAMUSCULAR; INTRAVENOUS
Status: COMPLETED | OUTPATIENT
Start: 2018-10-23 | End: 2018-10-23

## 2018-10-23 RX ORDER — PREDNISONE 20 MG/1
20 TABLET ORAL 2 TIMES DAILY
Qty: 10 TABLET | Refills: 0 | Status: SHIPPED | OUTPATIENT
Start: 2018-10-23 | End: 2018-10-28

## 2018-10-23 RX ORDER — BETAMETHASONE SODIUM PHOSPHATE AND BETAMETHASONE ACETATE 3; 3 MG/ML; MG/ML
12 INJECTION, SUSPENSION INTRA-ARTICULAR; INTRALESIONAL; INTRAMUSCULAR; SOFT TISSUE
Status: COMPLETED | OUTPATIENT
Start: 2018-10-23 | End: 2018-10-23

## 2018-10-23 RX ORDER — CODEINE PHOSPHATE AND GUAIFENESIN 10; 100 MG/5ML; MG/5ML
5 SOLUTION ORAL EVERY 6 HOURS PRN
Qty: 150 ML | Refills: 0 | Status: SHIPPED | OUTPATIENT
Start: 2018-10-23 | End: 2018-11-13

## 2018-10-23 RX ADMIN — CEFTRIAXONE 1 G: 1 INJECTION, POWDER, FOR SOLUTION INTRAMUSCULAR; INTRAVENOUS at 04:10

## 2018-10-23 RX ADMIN — BETAMETHASONE SODIUM PHOSPHATE AND BETAMETHASONE ACETATE 12 MG: 3; 3 INJECTION, SUSPENSION INTRA-ARTICULAR; INTRALESIONAL; INTRAMUSCULAR; SOFT TISSUE at 04:10

## 2018-10-23 NOTE — PROGRESS NOTES
Patient verified by name and . 12 mg betamethasone given im to right ventrogluteal and 1 gm ceftriaxone given im to left ventrogluteal using aseptic technique. Patient tolerated well

## 2018-10-23 NOTE — PROGRESS NOTES
Subjective:       Patient ID: Lola Stevens Jr. is a 47 y.o. male.    Chief Complaint: URI    HPI patient complained of cold symptoms a couple day now progressed to shortness of breath and increasing coughing mild fever no nausea vomiting diarrhea no chest pain had flu vaccine a few day before getting sick the night chronic medical problem  Review of Systems    Objective:      Physical Exam   Constitutional: He is oriented to person, place, and time. He appears well-developed and well-nourished. No distress.   HENT:   Head: Normocephalic and atraumatic.   Right Ear: External ear normal.   Left Ear: External ear normal.   Mouth/Throat: Oropharynx is clear and moist. No oropharyngeal exudate.   Nasal congestion bilaterally and a lot of coughing   Eyes: Conjunctivae and EOM are normal. Pupils are equal, round, and reactive to light. Right eye exhibits no discharge. Left eye exhibits no discharge.   Neck: Normal range of motion. Neck supple. No thyromegaly present.   Cardiovascular: Normal rate, regular rhythm, normal heart sounds and intact distal pulses. Exam reveals no gallop and no friction rub.   No murmur heard.  Pulmonary/Chest: Effort normal. No respiratory distress. He has no wheezes. He has rales (Bilateral rhonchi and wheezing). He exhibits no tenderness.   Abdominal: Soft. Bowel sounds are normal. He exhibits no distension. There is no tenderness. There is no rebound and no guarding.   Musculoskeletal: Normal range of motion. He exhibits no edema, tenderness or deformity.   Lymphadenopathy:     He has no cervical adenopathy.   Neurological: He is alert and oriented to person, place, and time.   Skin: Skin is warm and dry. Capillary refill takes less than 2 seconds. No rash noted. No erythema.   Psychiatric: He has a normal mood and affect. Judgment and thought content normal.   Nursing note and vitals reviewed.      Assessment:       1. Acute non-recurrent sinusitis, unspecified location    2.  Bronchitis        Plan:       Acute non-recurrent sinusitis, unspecified location  -     azithromycin (Z-ANAND) 250 MG tablet; 2 tabs by mouth day 1, then 1 tab by mouth daily x 4 days  Dispense: 6 tablet; Refill: 0  -     guaifenesin-codeine 100-10 mg/5 ml (TUSSI-ORGANIDIN NR)  mg/5 mL syrup; Take 5 mLs by mouth every 6 (six) hours as needed.  Dispense: 150 mL; Refill: 0    Bronchitis  -     betamethasone acetate-betamethasone sodium phosphate injection 12 mg  -     cefTRIAXone injection 1 g  -     predniSONE (DELTASONE) 20 MG tablet; Take 1 tablet (20 mg total) by mouth 2 (two) times daily. for 5 days  Dispense: 10 tablet; Refill: 0  -     albuterol (PROVENTIL/VENTOLIN HFA) 90 mcg/actuation inhaler; Inhale 2 puffs into the lungs every 4 (four) hours as needed for Wheezing or Shortness of Breath. Rescue  Dispense: 1 Inhaler; Refill: 1

## 2018-10-24 ENCOUNTER — TELEPHONE (OUTPATIENT)
Dept: ENDOSCOPY | Facility: HOSPITAL | Age: 48
End: 2018-10-24

## 2018-11-13 ENCOUNTER — OFFICE VISIT (OUTPATIENT)
Dept: PRIMARY CARE CLINIC | Facility: CLINIC | Age: 48
End: 2018-11-13
Payer: COMMERCIAL

## 2018-11-13 VITALS
WEIGHT: 204 LBS | RESPIRATION RATE: 16 BRPM | TEMPERATURE: 98 F | HEART RATE: 67 BPM | BODY MASS INDEX: 29.2 KG/M2 | DIASTOLIC BLOOD PRESSURE: 88 MMHG | HEIGHT: 70 IN | OXYGEN SATURATION: 98 % | SYSTOLIC BLOOD PRESSURE: 134 MMHG

## 2018-11-13 DIAGNOSIS — J20.9 ACUTE BRONCHITIS, UNSPECIFIED ORGANISM: Primary | ICD-10-CM

## 2018-11-13 PROBLEM — R10.84 GENERALIZED ABDOMINAL PAIN: Status: RESOLVED | Noted: 2018-10-17 | Resolved: 2018-11-13

## 2018-11-13 PROCEDURE — 3008F BODY MASS INDEX DOCD: CPT | Mod: CPTII,S$GLB,, | Performed by: FAMILY MEDICINE

## 2018-11-13 PROCEDURE — 99213 OFFICE O/P EST LOW 20 MIN: CPT | Mod: S$GLB,,, | Performed by: FAMILY MEDICINE

## 2018-11-13 PROCEDURE — 99999 PR PBB SHADOW E&M-EST. PATIENT-LVL III: CPT | Mod: PBBFAC,,, | Performed by: FAMILY MEDICINE

## 2018-11-13 RX ORDER — METHYLPREDNISOLONE 4 MG/1
TABLET ORAL
Qty: 1 PACKAGE | Refills: 0 | Status: SHIPPED | OUTPATIENT
Start: 2018-11-13 | End: 2019-01-04 | Stop reason: ALTCHOICE

## 2018-11-13 RX ORDER — AZITHROMYCIN 250 MG/1
TABLET, FILM COATED ORAL
Qty: 6 TABLET | Refills: 0 | Status: SHIPPED | OUTPATIENT
Start: 2018-11-13 | End: 2018-11-18

## 2018-11-13 NOTE — PROGRESS NOTES
"Subjective:       Patient ID: Lola Stevens Jr. is a 48 y.o. male.    Chief Complaint: Sore Throat; Ear Fullness; and Cough    Shortness of Breath   This is a new problem. The current episode started 1 to 4 weeks ago (1 month ago). The problem occurs daily. The problem has been gradually improving. Associated symptoms include wheezing. Pertinent negatives include no abdominal pain, coryza, fever, hemoptysis, leg pain, leg swelling, orthopnea, rash, rhinorrhea or syncope. Nothing aggravates the symptoms. The patient has no known risk factors for DVT/PE. He has tried beta agonist inhalers and oral steroids for the symptoms. The treatment provided moderate relief. There is no history of asthma, COPD, DVT, PE or pneumonia.     Review of Systems   Constitutional: Negative for fever.   HENT: Negative for rhinorrhea.    Respiratory: Positive for shortness of breath and wheezing. Negative for hemoptysis.    Cardiovascular: Negative for orthopnea, leg swelling and syncope.   Gastrointestinal: Negative for abdominal pain.   Skin: Negative for rash.       Objective:      Vitals:    11/13/18 1604   BP: 134/88   BP Location: Right arm   Patient Position: Sitting   BP Method: Large (Automatic)   Pulse: 67   Resp: 16   Temp: 98 °F (36.7 °C)   TempSrc: Oral   SpO2: 98%   Weight: 92.5 kg (204 lb)   Height: 5' 10" (1.778 m)     Physical Exam   Constitutional: He is oriented to person, place, and time. He appears well-developed and well-nourished.   HENT:   Head: Normocephalic and atraumatic.   Cardiovascular: Normal rate, regular rhythm and normal heart sounds.   Pulmonary/Chest: Effort normal. No respiratory distress. He has wheezes (faint end-expiratory) in the right lower field and the left lower field. He has no rhonchi. He has no rales.   Musculoskeletal: He exhibits no edema.   Neurological: He is alert and oriented to person, place, and time.   Skin: Skin is warm and dry.   Nursing note and vitals reviewed.    "   Assessment:       1. Acute bronchitis, unspecified organism        Plan:       Acute bronchitis, unspecified organism  -     X-Ray Chest PA And Lateral; Future; Expected date: 11/13/2018  -     azithromycin (Z-ANAND) 250 MG tablet; Take 2 tablets (500 mg total) by mouth once daily for 1 day, THEN 1 tablet (250 mg total) once daily for 4 days.  Dispense: 6 tablet; Refill: 0  -     methylPREDNISolone (MEDROL DOSEPACK) 4 mg tablet; use as directed  Dispense: 1 Package; Refill: 0   Chest x-ray reviewed, no acute abnormalities noted.       Medication List           Accurate as of 11/13/18  5:02 PM. If you have any questions, ask your nurse or doctor.               START taking these medications    azithromycin 250 MG tablet  Commonly known as:  Z-ANAND  Take 2 tablets (500 mg total) by mouth once daily for 1 day, THEN 1 tablet (250 mg total) once daily for 4 days.  Start taking on:  11/13/2018  Started by:  Estiven Worthington MD     methylPREDNISolone 4 mg tablet  Commonly known as:  MEDROL DOSEPACK  use as directed  Started by:  Estiven Worthington MD        CONTINUE taking these medications    albuterol 90 mcg/actuation inhaler  Commonly known as:  PROVENTIL/VENTOLIN HFA  Inhale 2 puffs into the lungs every 4 (four) hours as needed for Wheezing or Shortness of Breath. Rescue        STOP taking these medications    ADACEL(TDAP ADOLESN/ADULT)(PF) IM  Stopped by:  Estiven Worthington MD     dicyclomine 10 MG capsule  Commonly known as:  BENTYL  Stopped by:  Estiven Worthington MD     FLUARIX QUAD 3380-7385 (PF) 60 mcg (15 mcg x 4)/0.5 mL Syrg vaccine  Generic drug:  influenza  Stopped by:  Estiven Worthington MD     guaifenesin-codeine 100-10 mg/5 ml  mg/5 mL syrup  Commonly known as:  TUSSI-ORGANIDIN NR  Stopped by:  Estiven Worthington MD     pantoprazole 40 MG tablet  Commonly known as:  PROTONIX  Stopped by:  Estiven Worthington MD           Where to Get Your Medications      These medications were sent to [id number]  - [address] AT [facility] [address], [place] [address]    Phone:  [phone]   · azithromycin 250 MG tablet  · methylPREDNISolone 4 mg tablet

## 2018-11-27 ENCOUNTER — OFFICE VISIT (OUTPATIENT)
Dept: INTERNAL MEDICINE | Facility: CLINIC | Age: 48
End: 2018-11-27
Payer: COMMERCIAL

## 2018-11-27 ENCOUNTER — LAB VISIT (OUTPATIENT)
Dept: LAB | Facility: HOSPITAL | Age: 48
End: 2018-11-27
Attending: FAMILY MEDICINE
Payer: COMMERCIAL

## 2018-11-27 VITALS
SYSTOLIC BLOOD PRESSURE: 138 MMHG | BODY MASS INDEX: 28.44 KG/M2 | WEIGHT: 198.63 LBS | TEMPERATURE: 98 F | HEART RATE: 76 BPM | OXYGEN SATURATION: 99 % | HEIGHT: 70 IN | DIASTOLIC BLOOD PRESSURE: 72 MMHG

## 2018-11-27 DIAGNOSIS — R10.9 RIGHT FLANK PAIN: Primary | ICD-10-CM

## 2018-11-27 DIAGNOSIS — R82.90 ABNORMAL URINALYSIS: ICD-10-CM

## 2018-11-27 DIAGNOSIS — R10.13 EPIGASTRIC PAIN: ICD-10-CM

## 2018-11-27 DIAGNOSIS — R06.02 SOB (SHORTNESS OF BREATH): ICD-10-CM

## 2018-11-27 LAB
ALBUMIN SERPL BCP-MCNC: 4.3 G/DL
ALP SERPL-CCNC: 55 U/L
ALT SERPL W/O P-5'-P-CCNC: 41 U/L
ANION GAP SERPL CALC-SCNC: 11 MMOL/L
AST SERPL-CCNC: 23 U/L
BASOPHILS # BLD AUTO: 0.02 K/UL
BASOPHILS NFR BLD: 0.3 %
BILIRUB SERPL-MCNC: 0.9 MG/DL
BILIRUB SERPL-MCNC: ABNORMAL MG/DL
BILIRUB UR QL STRIP: NEGATIVE
BLOOD URINE, POC: ABNORMAL
BUN SERPL-MCNC: 10 MG/DL
CALCIUM SERPL-MCNC: 9.6 MG/DL
CHLORIDE SERPL-SCNC: 101 MMOL/L
CLARITY UR REFRACT.AUTO: CLEAR
CO2 SERPL-SCNC: 28 MMOL/L
COLOR UR AUTO: YELLOW
COLOR, POC UA: YELLOW
CREAT SERPL-MCNC: 1 MG/DL
DIFFERENTIAL METHOD: NORMAL
EOSINOPHIL # BLD AUTO: 0.1 K/UL
EOSINOPHIL NFR BLD: 1.4 %
ERYTHROCYTE [DISTWIDTH] IN BLOOD BY AUTOMATED COUNT: 12.4 %
EST. GFR  (AFRICAN AMERICAN): >60 ML/MIN/1.73 M^2
EST. GFR  (NON AFRICAN AMERICAN): >60 ML/MIN/1.73 M^2
GLUCOSE SERPL-MCNC: 121 MG/DL
GLUCOSE UR QL STRIP: NEGATIVE
GLUCOSE UR QL STRIP: NORMAL
HCT VFR BLD AUTO: 44.7 %
HGB BLD-MCNC: 15.3 G/DL
HGB UR QL STRIP: ABNORMAL
KETONES UR QL STRIP: ABNORMAL
KETONES UR QL STRIP: NEGATIVE
LEUKOCYTE ESTERASE UR QL STRIP: NEGATIVE
LEUKOCYTE ESTERASE URINE, POC: ABNORMAL
LIPASE SERPL-CCNC: 21 U/L
LYMPHOCYTES # BLD AUTO: 1.7 K/UL
LYMPHOCYTES NFR BLD: 27.6 %
MCH RBC QN AUTO: 29.7 PG
MCHC RBC AUTO-ENTMCNC: 34.2 G/DL
MCV RBC AUTO: 87 FL
MICROSCOPIC COMMENT: NORMAL
MONOCYTES # BLD AUTO: 0.4 K/UL
MONOCYTES NFR BLD: 5.9 %
NEUTROPHILS # BLD AUTO: 4.1 K/UL
NEUTROPHILS NFR BLD: 64.6 %
NITRITE UR QL STRIP: NEGATIVE
NITRITE, POC UA: ABNORMAL
PH UR STRIP: 6 [PH] (ref 5–8)
PH, POC UA: 6
PLATELET # BLD AUTO: 244 K/UL
PMV BLD AUTO: 9.3 FL
POTASSIUM SERPL-SCNC: 3.9 MMOL/L
PROT SERPL-MCNC: 7.2 G/DL
PROT UR QL STRIP: NEGATIVE
PROTEIN, POC: ABNORMAL
RBC # BLD AUTO: 5.15 M/UL
RBC #/AREA URNS AUTO: 3 /HPF (ref 0–4)
SODIUM SERPL-SCNC: 140 MMOL/L
SP GR UR STRIP: 1.01 (ref 1–1.03)
SPECIFIC GRAVITY, POC UA: 1.01
URN SPEC COLLECT METH UR: ABNORMAL
UROBILINOGEN, POC UA: NORMAL
WBC # BLD AUTO: 6.27 K/UL
WBC #/AREA URNS AUTO: 0 /HPF (ref 0–5)

## 2018-11-27 PROCEDURE — 87186 SC STD MICRODIL/AGAR DIL: CPT

## 2018-11-27 PROCEDURE — 93005 ELECTROCARDIOGRAM TRACING: CPT | Mod: S$GLB,,, | Performed by: NURSE PRACTITIONER

## 2018-11-27 PROCEDURE — 99214 OFFICE O/P EST MOD 30 MIN: CPT | Mod: 25,S$GLB,, | Performed by: NURSE PRACTITIONER

## 2018-11-27 PROCEDURE — 3008F BODY MASS INDEX DOCD: CPT | Mod: CPTII,S$GLB,, | Performed by: NURSE PRACTITIONER

## 2018-11-27 PROCEDURE — 81002 URINALYSIS NONAUTO W/O SCOPE: CPT | Mod: S$GLB,,, | Performed by: NURSE PRACTITIONER

## 2018-11-27 PROCEDURE — 87077 CULTURE AEROBIC IDENTIFY: CPT

## 2018-11-27 PROCEDURE — 36415 COLL VENOUS BLD VENIPUNCTURE: CPT

## 2018-11-27 PROCEDURE — 87086 URINE CULTURE/COLONY COUNT: CPT

## 2018-11-27 PROCEDURE — 87088 URINE BACTERIA CULTURE: CPT

## 2018-11-27 PROCEDURE — 81001 URINALYSIS AUTO W/SCOPE: CPT

## 2018-11-27 PROCEDURE — 93010 ELECTROCARDIOGRAM REPORT: CPT | Mod: S$GLB,,, | Performed by: INTERNAL MEDICINE

## 2018-11-27 PROCEDURE — 85025 COMPLETE CBC W/AUTO DIFF WBC: CPT

## 2018-11-27 PROCEDURE — 99999 PR PBB SHADOW E&M-EST. PATIENT-LVL III: CPT | Mod: PBBFAC,,, | Performed by: NURSE PRACTITIONER

## 2018-11-27 PROCEDURE — 83690 ASSAY OF LIPASE: CPT

## 2018-11-27 PROCEDURE — 80053 COMPREHEN METABOLIC PANEL: CPT

## 2018-11-27 RX ORDER — CETIRIZINE HYDROCHLORIDE 10 MG/1
10 TABLET ORAL DAILY
Refills: 0 | COMMUNITY
Start: 2018-11-27 | End: 2019-01-04 | Stop reason: ALTCHOICE

## 2018-11-27 RX ORDER — DICYCLOMINE HYDROCHLORIDE 10 MG/1
10 CAPSULE ORAL 3 TIMES DAILY
Qty: 90 CAPSULE | Refills: 0 | Status: SHIPPED | OUTPATIENT
Start: 2018-11-27 | End: 2018-12-27

## 2018-11-27 NOTE — PROGRESS NOTES
Subjective:       Patient ID: Lola Stevens Jr. is a 48 y.o. male.    Chief Complaint: URI    Mr. Stevens presents to the clinic today for shortness of breath and pain.  He feels sternal pain, right flank pain, RUQ pain, and epigastric pain. He has long standing GI issues and is currently taking pantoprazole and metamucil daily. He went to urgent care on 10/23 and to his PCP on 11/13 for cough and congestion. At both visits a z-pack and steroid was prescribed. The cough is slightly better but he is having trouble taking deep breaths and feels tightness in his chest. He is a new patient to me.       Review of Systems   Constitutional: Positive for chills and fatigue. Negative for fever.   HENT: Positive for ear pain and postnasal drip.    Eyes: Negative for visual disturbance.   Respiratory: Positive for cough, chest tightness and shortness of breath.    Cardiovascular: Positive for chest pain. Negative for palpitations and leg swelling.   Gastrointestinal: Negative for nausea and vomiting.   Genitourinary: Positive for dysuria, flank pain and frequency.   Musculoskeletal: Negative for gait problem.   Skin: Negative for rash.   Neurological: Positive for headaches.   Psychiatric/Behavioral: Negative for confusion.       Objective:      Physical Exam   Constitutional: He is oriented to person, place, and time. He appears well-developed and well-nourished. No distress.   HENT:   Head: Atraumatic.   Eyes: No scleral icterus.   Neck: Normal range of motion. Neck supple.   Cardiovascular: Normal rate, regular rhythm and normal heart sounds. Exam reveals no gallop and no friction rub.   No murmur heard.  Pulmonary/Chest: Effort normal and breath sounds normal. No stridor. No respiratory distress. He has no wheezes. He has no rales.   Abdominal: Soft. Normal appearance and bowel sounds are normal. There is tenderness in the right upper quadrant and epigastric area. There is CVA tenderness. There is no rigidity, no  rebound, no guarding, no tenderness at McBurney's point and negative Singleton's sign.   Musculoskeletal: He exhibits no edema.   Lymphadenopathy:     He has no cervical adenopathy.   Neurological: He is alert and oriented to person, place, and time.   Skin: Skin is warm and dry. He is not diaphoretic.   Psychiatric: He has a normal mood and affect. His behavior is normal.   Nursing note and vitals reviewed.      Assessment:       1. Right flank pain    2. SOB (shortness of breath)    3. Epigastric pain    4. Abnormal urinalysis        Plan:   Lola was seen today for uri.    Diagnoses and all orders for this visit:    Right flank pain  -     POCT urine dipstick without microscope    SOB (shortness of breath)  -     EKG 12-lead  -     CBC auto differential; Future  -     Comprehensive metabolic panel; Future    Epigastric pain  -     Lipase; Future  -     (pyxis) gi cocktail (mylanta 30 mL, lidocaine 2 % viscous 10 mL, dicyclomine 10 mL) 50 mL  -     ranitidine (ZANTAC) 150 MG capsule; Take 1 capsule (150 mg total) by mouth 2 (two) times daily.  -     dicyclomine (BENTYL) 10 MG capsule; Take 1 capsule (10 mg total) by mouth 3 (three) times daily.    Abnormal urinalysis  -     Urine culture  -     URINALYSIS    Other orders  -     cetirizine (ZYRTEC) 10 MG tablet; Take 1 tablet (10 mg total) by mouth once daily.            Pt has been given instructions populated from [employer] database and has verbalized understanding of the after visit summary and information contained wherein.    Follow up with a primary care provider. May go to ER for acute shortness of breath, lightheadedness, fever, or any other emergent complaints or changes in condition.

## 2018-11-29 ENCOUNTER — TELEPHONE (OUTPATIENT)
Dept: INTERNAL MEDICINE | Facility: CLINIC | Age: 48
End: 2018-11-29

## 2018-11-29 ENCOUNTER — TELEPHONE (OUTPATIENT)
Dept: GASTROENTEROLOGY | Facility: CLINIC | Age: 48
End: 2018-11-29

## 2018-11-29 DIAGNOSIS — R94.31 ABNORMAL EKG: Primary | ICD-10-CM

## 2018-11-29 DIAGNOSIS — R31.9 URINARY TRACT INFECTION WITH HEMATURIA, SITE UNSPECIFIED: ICD-10-CM

## 2018-11-29 DIAGNOSIS — N39.0 URINARY TRACT INFECTION WITH HEMATURIA, SITE UNSPECIFIED: ICD-10-CM

## 2018-11-29 RX ORDER — NITROFURANTOIN 25; 75 MG/1; MG/1
100 CAPSULE ORAL 2 TIMES DAILY
Qty: 14 CAPSULE | Refills: 0 | Status: SHIPPED | OUTPATIENT
Start: 2018-11-29 | End: 2019-01-04 | Stop reason: ALTCHOICE

## 2018-11-29 NOTE — TELEPHONE ENCOUNTER
----- Message from Delores Mendez sent at 11/29/2018 10:36 AM CST -----  Contact: Self- [phone]  Darrell- pt called to schedule f/u appt- still having the abdominal pain following upper and lower scopes- please contact pt at [phone]

## 2018-11-29 NOTE — TELEPHONE ENCOUNTER
----- Message from Sabina Barbosa MA sent at 11/29/2018  1:21 PM CST -----  Contact: Self- [phone]  Dr Ramírez,  Pt has an appt with you on 1/16. He would like to speak to you. He is still having abd pain.  Sabina   ----- Message -----  From: Delores Mendez  Sent: 11/29/2018  10:36 AM  To: Drarell Meyer Staff    Darrell- pt called to schedule f/u appt- still having the abdominal pain following upper and lower scopes- please contact pt at [phone]

## 2018-11-29 NOTE — TELEPHONE ENCOUNTER
"11/29/2018  2:11 PM    Called Mr. Stevens to see what was going on with his abdominal pain (epiastric/RUQ as well as right flank) as MA sent me a message.    He went to NP on 11/27/18 in which HPI reports:   Rodney presents to the clinic today for shortness of breath and pain.  He feels sternal pain, right flank pain, RUQ pain, and epigastric pain. He has long standing GI issues and is currently taking pantoprazole and metamucil daily. He went to urgent care on 10/23 and to his PCP on 11/13 for cough and congestion. At both visits a z-pack and steroid was prescribed. The cough is slightly better but he is having trouble taking deep breaths and feels tightness in his chest. He is a new patient to me.    Labs from that visit as follow:   11/27/2018 15:02   Sodium 140   Potassium 3.9   Chloride 101   CO2 28   Anion Gap 11   BUN, Bld 10   Creatinine 1.0   eGFR if non  >60   eGFR if African American >60   Glucose 121 (H)   Calcium 9.6   Alkaline Phosphatase 55   Total Protein 7.2   Albumin 4.3   Total Bilirubin 0.9   AST 23   ALT 41   Lipase 21      11/27/2018 15:02   WBC 6.27   RBC 5.15   Hemoglobin 15.3   Hematocrit 44.7   MCV 87   MCH 29.7   MCHC 34.2   RDW 12.4   Platelets 244     He had an abnormal EKG for which a stress test has been ordered  Urine cx + for Enterococcus species (prelim result) for which he has started antibiotics    This afternoon when speaking to him he felt that his pain had "tremendously" improved. Honestly no true nausea, emesis, abdominal pain, or changes in bowel habits currently which I could elicit during our call.    With our extensive workup which has included recent CT/scope unsure that this is pain is of concern especially as he has improved with treatment of UTI and Zantac/Bentyl as needed. Pain could have been secondary to ingestion vs UTI.    Recommendations:  --PPI BID with first dose 45 minutes before breakfast  --Zantac and Bentyl as needed  --Follow up via phone on " Monday 12/3/18 to ensure that he continues to improve, if so will see him in clinic on 2/13/18    Betsy Ramírez M.D.  Gastroenterology Fellow, PGY-V  Pager: [phone]  [facility]

## 2018-11-29 NOTE — TELEPHONE ENCOUNTER
Spoke with patient. Antibiotic ordered for UTI. Stress test ordered for abnormal EKG.   His breathing has improved with daily zyrtec.

## 2018-11-30 LAB — BACTERIA UR CULT: NORMAL

## 2018-12-03 ENCOUNTER — TELEPHONE (OUTPATIENT)
Dept: GASTROENTEROLOGY | Facility: CLINIC | Age: 48
End: 2018-12-03

## 2018-12-03 NOTE — TELEPHONE ENCOUNTER
----- Message from Betsy Ramírez MD sent at 11/29/2018  2:22 PM CST -----  Call Mr. Stevens on Monday to see how his abdominal pain is doing.

## 2018-12-03 NOTE — TELEPHONE ENCOUNTER
12/03/2018  1:32 PM    Continued improvement in abdominal pain. He is no longer worried and has scheduled appointment with me on 2/13/19 with me.    Betsy Ramírez M.D.  Gastroenterology Fellow, PGY-V  Pager: [phone]  [facility]

## 2019-01-03 ENCOUNTER — TELEPHONE (OUTPATIENT)
Dept: PRIMARY CARE CLINIC | Facility: CLINIC | Age: 49
End: 2019-01-03

## 2019-01-03 NOTE — TELEPHONE ENCOUNTER
----- Message from Donavon Goff sent at 1/3/2019 12:36 PM CST -----  Contact: wife Fartun   Type:  Same Day Appointment Request    Caller is requesting a same day appointment.  Caller declined first available appointment listed below.      Name of Caller: Wife   When is the first available appointment? 1/8  Symptoms:  bp check   Best Call Back Number:  [phone]

## 2019-01-04 ENCOUNTER — OFFICE VISIT (OUTPATIENT)
Dept: PRIMARY CARE CLINIC | Facility: CLINIC | Age: 49
End: 2019-01-04
Payer: COMMERCIAL

## 2019-01-04 VITALS
RESPIRATION RATE: 18 BRPM | HEART RATE: 105 BPM | WEIGHT: 200.81 LBS | SYSTOLIC BLOOD PRESSURE: 142 MMHG | BODY MASS INDEX: 28.75 KG/M2 | DIASTOLIC BLOOD PRESSURE: 86 MMHG | HEIGHT: 70 IN | TEMPERATURE: 99 F | OXYGEN SATURATION: 97 %

## 2019-01-04 DIAGNOSIS — R31.9 HEMATURIA, UNSPECIFIED TYPE: ICD-10-CM

## 2019-01-04 DIAGNOSIS — R03.0 ELEVATED BLOOD PRESSURE READING IN OFFICE WITHOUT DIAGNOSIS OF HYPERTENSION: Primary | ICD-10-CM

## 2019-01-04 DIAGNOSIS — F41.9 ANXIETY: ICD-10-CM

## 2019-01-04 LAB
BILIRUB SERPL-MCNC: ABNORMAL MG/DL
BLOOD URINE, POC: ABNORMAL
COLOR, POC UA: YELLOW
GLUCOSE UR QL STRIP: ABNORMAL
KETONES UR QL STRIP: ABNORMAL
LEUKOCYTE ESTERASE URINE, POC: ABNORMAL
NITRITE, POC UA: ABNORMAL
PH, POC UA: 5
PROTEIN, POC: ABNORMAL
SPECIFIC GRAVITY, POC UA: 1.01
UROBILINOGEN, POC UA: ABNORMAL

## 2019-01-04 PROCEDURE — 99214 PR OFFICE/OUTPT VISIT, EST, LEVL IV, 30-39 MIN: ICD-10-PCS | Mod: 25,S$GLB,, | Performed by: NURSE PRACTITIONER

## 2019-01-04 PROCEDURE — 99214 OFFICE O/P EST MOD 30 MIN: CPT | Mod: 25,S$GLB,, | Performed by: NURSE PRACTITIONER

## 2019-01-04 PROCEDURE — 81002 POCT URINE DIPSTICK WITHOUT MICROSCOPE: ICD-10-PCS | Mod: S$GLB,,, | Performed by: NURSE PRACTITIONER

## 2019-01-04 PROCEDURE — 99999 PR PBB SHADOW E&M-EST. PATIENT-LVL IV: CPT | Mod: PBBFAC,,, | Performed by: NURSE PRACTITIONER

## 2019-01-04 PROCEDURE — 3008F BODY MASS INDEX DOCD: CPT | Mod: CPTII,S$GLB,, | Performed by: NURSE PRACTITIONER

## 2019-01-04 PROCEDURE — 3008F PR BODY MASS INDEX (BMI) DOCUMENTED: ICD-10-PCS | Mod: CPTII,S$GLB,, | Performed by: NURSE PRACTITIONER

## 2019-01-04 PROCEDURE — 99999 PR PBB SHADOW E&M-EST. PATIENT-LVL IV: ICD-10-PCS | Mod: PBBFAC,,, | Performed by: NURSE PRACTITIONER

## 2019-01-04 PROCEDURE — 81002 URINALYSIS NONAUTO W/O SCOPE: CPT | Mod: S$GLB,,, | Performed by: NURSE PRACTITIONER

## 2019-01-04 RX ORDER — ALPRAZOLAM 0.5 MG/1
0.5 TABLET ORAL 3 TIMES DAILY
Qty: 90 TABLET | Refills: 0 | Status: SHIPPED | OUTPATIENT
Start: 2019-01-04 | End: 2019-03-12

## 2019-01-04 RX ORDER — PANTOPRAZOLE SODIUM 40 MG/1
1 TABLET, DELAYED RELEASE ORAL DAILY
Refills: 4 | COMMUNITY
Start: 2018-12-10 | End: 2019-03-12

## 2019-01-04 NOTE — LETTER
2019    Lola Stevens Jr.  [address]  [place] [address]             [facility]  [address] [facility] [address], [facility] [address] [place] [id number]  Phone: [phone]  Fax: [phone] To whom it may concern,    The above patient  1970 is currently under my care. He was seen in my office on the above date for blood pressure management and evaluation for hypertension. Please feel free to contact my clinic for any further concerns.     Respectfully,        Yue Medina, [place]

## 2019-01-04 NOTE — PROGRESS NOTES
Chief Complaint  Chief Complaint   Patient presents with    Hypertension    Anxiety       HPI    Lola Stevens Jr. is a 48 y.o. male with multiple medical diagnoses as listed in the medical history and problem list that presents for hypertension followup, UTI follow-up.    Elevated blood pressure-patient without a diagnosis of hypertension presents to clinic reporting elevated blood pressure at a routine physical examination for an upcoming job.  Reports blood pressure is 169/103 approximately 2 weeks ago during a routine physical exam.  Heart rate 113 at that time.  Patient states that he was incredibly anxious throughout the physical and thinks that contributed to the elevated blood pressures.  Recently checking blood pressures at home since that episode and ranging 130s to 140s/90s.  Patient without previous diagnosis of hypertension in the past.  No recent lab work.  States that recent lab work was drawn during his routine physical exam including cholesterol screenings.    Anxiety-denies a history of anxiety in the past.  Reports new onset anxiety particularly related to this job interview including a possible at panic attacks occurring due for employment physical examination when blood pressures were found to be severely elevated and heart rate elevated at the time.  Patient reports increased stressors and increased anxiety related to his upcoming interview process.  Reports sleep is good.  Denies dysphoria.    UTI follow-up-patient with history of a UTI seen 11/27 and treated with Macrobid at that time.  Urine dipstick noted with microscopic hematuria and urine culture with E coli.  Patient reports to clinic with no symptoms at this time.  Continues with symptoms of generalized abdominal pain primarily in the right upper quadrant in generally exacerbated by eating certain foods, eating greasy foods, alcohol.    PAST MEDICAL HISTORY:  Past Medical History:   Diagnosis Date    Diverticulosis     GERD  (gastroesophageal reflux disease)     Hemorrhoids        PAST SURGICAL HISTORY:  Past Surgical History:   Procedure Laterality Date    CHOLECYSTECTOMY      COLONOSCOPY      COLONOSCOPY N/A 10/17/2018    [facility] ([facility])    EGD (ESOPHAGOGASTRODUODENOSCOPY) N/A 10/17/2018    [facility] ENDO (4TH FLR)    EGD (ESOPHAGOGASTRODUODENOSCOPY) N/A 2013    [facility] at [facility]O    ESOPHAGOGASTRODUODENOSCOPY         SOCIAL HISTORY:  Social History     Socioeconomic History    Marital status:      Spouse name: Not on file    Number of children: Not on file    Years of education: Not on file    Highest education level: Not on file   Social Needs    Financial resource strain: Not on file    Food insecurity - worry: Not on file    Food insecurity - inability: Not on file    Transportation needs - medical: Not on file    Transportation needs - non-medical: Not on file   Occupational History    Not on file   Tobacco Use    Smoking status: Former Smoker     Packs/day: 1.00     Years: 15.00     Pack years: 15.00     Types: Cigarettes     Last attempt to quit: 10/17/2002     Years since quittin.2    Smokeless tobacco: Former User     Quit date: 2018    Tobacco comment: quit smoking 15 years ago   Substance and Sexual Activity    Alcohol use: Yes     Comment: on weekends    Drug use: No    Sexual activity: Not on file   Other Topics Concern    Not on file   Social History Narrative    Not on file       FAMILY HISTORY:  Family History   Problem Relation Age of Onset    Heart disease Father     Colon cancer Neg Hx     Esophageal cancer Neg Hx     Stomach cancer Neg Hx     Ulcerative colitis Neg Hx     Crohn's disease Neg Hx     Celiac disease Neg Hx     Irritable bowel syndrome Neg Hx        ALLERGIES AND MEDICATIONS: updated and reviewed.  Review of patient's allergies indicates:  No  "Known Allergies  Current Outpatient Medications   Medication Sig Dispense Refill    ALPRAZolam (XANAX) 0.5 MG tablet Take 1 tablet (0.5 mg total) by mouth 3 (three) times daily. 90 tablet 0    pantoprazole (PROTONIX) 40 MG tablet Take 1 tablet by mouth once daily.  4     No current facility-administered medications for this visit.          ROS  Review of Systems   Constitutional: Negative for chills and fever.   HENT: Negative for ear pain, postnasal drip and sinus pain.    Respiratory: Negative for cough and shortness of breath.    Cardiovascular: Negative for chest pain.   Gastrointestinal: Positive for abdominal pain. Negative for diarrhea, nausea and vomiting.   Allergic/Immunologic: Positive for environmental allergies.   Psychiatric/Behavioral: Negative for dysphoric mood and sleep disturbance. The patient is nervous/anxious.            PHYSICAL EXAM  Vitals:    01/04/19 1335 01/04/19 1352   BP: (!) 152/94 (!) 142/86   BP Location: Right arm Left arm   Patient Position: Sitting Sitting   BP Method: Medium (Automatic) Large (Manual)   Pulse: 105    Resp: 18    Temp: 98.6 °F (37 °C)    TempSrc: Oral    SpO2: 97%    Weight: 91.1 kg (200 lb 12.8 oz)    Height: 5' 10" (1.778 m)     Body mass index is 28.81 kg/m².  Weight: 91.1 kg (200 lb 12.8 oz)   Height: 5' 10" (177.8 cm)     Physical Exam   Constitutional: He is oriented to person, place, and time. He appears well-developed and well-nourished.   HENT:   Head: Normocephalic.   Right Ear: Tympanic membrane normal.   Left Ear: Tympanic membrane normal.   Mouth/Throat: Uvula is midline, oropharynx is clear and moist and mucous membranes are normal.   Eyes: Conjunctivae are normal.   Cardiovascular: Normal rate, regular rhythm, normal heart sounds and normal pulses.   No murmur heard.  Pulses:       Radial pulses are 2+ on the right side, and 2+ on the left side.   No LE swelling noted   Pulmonary/Chest: Effort normal and breath sounds normal. He has no wheezes. "   Abdominal: Soft. Bowel sounds are normal. There is no tenderness.   Musculoskeletal: He exhibits no edema.   Lymphadenopathy:     He has no cervical adenopathy.   Neurological: He is alert and oriented to person, place, and time.   Skin: Skin is warm and dry. No rash noted.   Psychiatric: He has a normal mood and affect.         Health Maintenance       Date Due Completion Date    Lipid Panel 1970 ---    TETANUS VACCINE 10/26/1988 ---    Influenza Vaccine 08/01/2018 ---            Assessment & Plan    Problem List Items Addressed This Visit     None      Visit Diagnoses     Elevated blood pressure reading in office without diagnosis of hypertension    -  Primary    Anxiety        Relevant Medications    ALPRAZolam (XANAX) 0.5 MG tablet      Hematuria, unspecified type        Relevant Orders    POCT URINE DIPSTICK WITHOUT MICROSCOPE (Completed)  Continues with trace hematuria.  Urine sent for culture.           Follow-up: Follow-up in about 3 months (around 4/4/2019).    Yue Medina       Medication List           Accurate as of 1/4/19  2:38 PM. If you have any questions, ask your nurse or doctor.               START taking these medications    ALPRAZolam 0.5 MG tablet  Commonly known as:  XANAX  Take 1 tablet (0.5 mg total) by mouth 3 (three) times daily.  Started by:  Yue Medina NP        CONTINUE taking these medications    pantoprazole 40 MG tablet  Commonly known as:  PROTONIX        STOP taking these medications    albuterol 90 mcg/actuation inhaler  Commonly known as:  PROVENTIL/VENTOLIN HFA  Stopped by:  Yue Medina NP     cetirizine 10 MG tablet  Commonly known as:  ZYRTEC  Stopped by:  Yue Medina NP     methylPREDNISolone 4 mg tablet  Commonly known as:  MEDROL DOSEPACK  Stopped by:  Yue Medina NP     nitrofurantoin (macrocrystal-monohydrate) 100 MG capsule  Commonly known as:  MACROBID  Stopped by:  uYe Medina NP     ranitidine 150 MG capsule  Commonly known as:   ZANTAC  Stopped by:  Yue Medina NP           Where to Get Your Medications      [id number] [place] - [address] [facility] [address], [place] [address]    Phone:  [phone]   · ALPRAZolam 0.5 MG tablet

## 2019-01-07 ENCOUNTER — TELEPHONE (OUTPATIENT)
Dept: PRIMARY CARE CLINIC | Facility: CLINIC | Age: 49
End: 2019-01-07

## 2019-01-07 NOTE — TELEPHONE ENCOUNTER
----- Message from Meenakshi Schulte sent at 1/7/2019  3:54 PM CST -----  Contact: Maria G Stevens (Spouse) [phone]  Maria G Stevens (Spouse) [phone]  Requesting UA results

## 2019-01-07 NOTE — TELEPHONE ENCOUNTER
Spoke with patient notified him that there was a lab error and we need him to come in and do another urine sample. States he will come in to drop off so we can send it in for a culture.

## 2019-01-09 ENCOUNTER — TELEPHONE (OUTPATIENT)
Dept: PRIMARY CARE CLINIC | Facility: CLINIC | Age: 49
End: 2019-01-09

## 2019-01-09 NOTE — TELEPHONE ENCOUNTER
----- Message from Meenakshi Schulte sent at 1/9/2019  2:17 PM CST -----  Contact: Maria G Stevens (Spouse) [phone]   Maria G Stevens (Spouse) [phone]   Please call in regards patient have possible bladder infection, taking azo   Will it affect urine sample

## 2019-01-10 ENCOUNTER — TELEPHONE (OUTPATIENT)
Dept: PRIMARY CARE CLINIC | Facility: CLINIC | Age: 49
End: 2019-01-10

## 2019-01-10 DIAGNOSIS — N41.0 ACUTE PROSTATITIS: Primary | ICD-10-CM

## 2019-01-10 RX ORDER — CIPROFLOXACIN 500 MG/1
500 TABLET ORAL EVERY 12 HOURS
Qty: 28 TABLET | Refills: 0 | Status: SHIPPED | OUTPATIENT
Start: 2019-01-10 | End: 2019-03-12

## 2019-01-10 NOTE — TELEPHONE ENCOUNTER
----- Message from Janel Joshi sent at 1/10/2019 11:30 AM CST -----  Contact: pt's wife Amanda  ..Type: Needs Medical Advice    Who Called: pt  Best Call Back Number:   Additional Information: Amanda would like to speak with a nurse in regards her 's urine specimen. Please call to advise  Thanks

## 2019-02-27 DIAGNOSIS — R94.31 NONSPECIFIC ABNORMAL ELECTROCARDIOGRAM (ECG) (EKG): Primary | ICD-10-CM

## 2019-03-03 ENCOUNTER — HOSPITAL ENCOUNTER (OUTPATIENT)
Facility: HOSPITAL | Age: 49
Discharge: HOME | End: 2019-03-04
Attending: EMERGENCY MEDICINE | Admitting: EMERGENCY MEDICINE
Payer: COMMERCIAL

## 2019-03-03 DIAGNOSIS — R07.9 CHEST PAIN: ICD-10-CM

## 2019-03-03 DIAGNOSIS — R55 PRE-SYNCOPE: Primary | ICD-10-CM

## 2019-03-03 PROBLEM — N41.9 PROSTATITIS: Status: ACTIVE | Noted: 2019-03-03

## 2019-03-03 PROBLEM — N50.819 TESTICULAR PAIN: Status: ACTIVE | Noted: 2019-03-03

## 2019-03-03 LAB
ALBUMIN SERPL BCP-MCNC: 4.3 G/DL
ALP SERPL-CCNC: 60 U/L
ALT SERPL W/O P-5'-P-CCNC: 24 U/L
ANION GAP SERPL CALC-SCNC: 12 MMOL/L
AST SERPL-CCNC: 23 U/L
BASOPHILS # BLD AUTO: 0.02 K/UL
BASOPHILS NFR BLD: 0.3 %
BILIRUB SERPL-MCNC: 0.7 MG/DL
BILIRUB UR QL STRIP: NEGATIVE
BUN SERPL-MCNC: 13 MG/DL
CALCIUM SERPL-MCNC: 9.6 MG/DL
CHLORIDE SERPL-SCNC: 107 MMOL/L
CLARITY UR REFRACT.AUTO: CLEAR
CO2 SERPL-SCNC: 20 MMOL/L
COLOR UR AUTO: ABNORMAL
CREAT SERPL-MCNC: 0.9 MG/DL
DIFFERENTIAL METHOD: NORMAL
EOSINOPHIL # BLD AUTO: 0.1 K/UL
EOSINOPHIL NFR BLD: 0.8 %
ERYTHROCYTE [DISTWIDTH] IN BLOOD BY AUTOMATED COUNT: 11.5 %
EST. GFR  (AFRICAN AMERICAN): >60 ML/MIN/1.73 M^2
EST. GFR  (NON AFRICAN AMERICAN): >60 ML/MIN/1.73 M^2
GLUCOSE SERPL-MCNC: 88 MG/DL
GLUCOSE UR QL STRIP: NEGATIVE
HCT VFR BLD AUTO: 44.9 %
HGB BLD-MCNC: 16.1 G/DL
HGB UR QL STRIP: NEGATIVE
IMM GRANULOCYTES # BLD AUTO: 0.02 K/UL
IMM GRANULOCYTES NFR BLD AUTO: 0.3 %
KETONES UR QL STRIP: ABNORMAL
LEUKOCYTE ESTERASE UR QL STRIP: NEGATIVE
LYMPHOCYTES # BLD AUTO: 1.4 K/UL
LYMPHOCYTES NFR BLD: 21.5 %
MAGNESIUM SERPL-MCNC: 2.7 MG/DL
MCH RBC QN AUTO: 30.5 PG
MCHC RBC AUTO-ENTMCNC: 35.9 G/DL
MCV RBC AUTO: 85 FL
MONOCYTES # BLD AUTO: 0.4 K/UL
MONOCYTES NFR BLD: 5.9 %
NEUTROPHILS # BLD AUTO: 4.7 K/UL
NEUTROPHILS NFR BLD: 71.2 %
NITRITE UR QL STRIP: NEGATIVE
NRBC BLD-RTO: 0 /100 WBC
PH UR STRIP: 8 [PH] (ref 5–8)
PLATELET # BLD AUTO: 226 K/UL
PMV BLD AUTO: 9.5 FL
POCT GLUCOSE: 80 MG/DL (ref 70–110)
POTASSIUM SERPL-SCNC: 4.6 MMOL/L
PROT SERPL-MCNC: 7.4 G/DL
PROT UR QL STRIP: NEGATIVE
RBC # BLD AUTO: 5.28 M/UL
SODIUM SERPL-SCNC: 139 MMOL/L
SP GR UR STRIP: 1.01 (ref 1–1.03)
TROPONIN I SERPL DL<=0.01 NG/ML-MCNC: 0.01 NG/ML
TROPONIN I SERPL DL<=0.01 NG/ML-MCNC: <0.006 NG/ML
TSH SERPL DL<=0.005 MIU/L-ACNC: 1.42 UIU/ML
URN SPEC COLLECT METH UR: ABNORMAL
WBC # BLD AUTO: 6.64 K/UL

## 2019-03-03 PROCEDURE — 93005 ELECTROCARDIOGRAM TRACING: CPT

## 2019-03-03 PROCEDURE — 25000003 PHARM REV CODE 250: Performed by: NURSE PRACTITIONER

## 2019-03-03 PROCEDURE — 36415 COLL VENOUS BLD VENIPUNCTURE: CPT

## 2019-03-03 PROCEDURE — 99285 EMERGENCY DEPT VISIT HI MDM: CPT

## 2019-03-03 PROCEDURE — G0378 HOSPITAL OBSERVATION PER HR: HCPCS

## 2019-03-03 PROCEDURE — 80053 COMPREHEN METABOLIC PANEL: CPT

## 2019-03-03 PROCEDURE — 84443 ASSAY THYROID STIM HORMONE: CPT

## 2019-03-03 PROCEDURE — 99284 EMERGENCY DEPT VISIT MOD MDM: CPT | Mod: ,,, | Performed by: EMERGENCY MEDICINE

## 2019-03-03 PROCEDURE — 85025 COMPLETE CBC W/AUTO DIFF WBC: CPT

## 2019-03-03 PROCEDURE — A4216 STERILE WATER/SALINE, 10 ML: HCPCS | Performed by: NURSE PRACTITIONER

## 2019-03-03 PROCEDURE — 99218 PR INITIAL OBSERVATION CARE,LEVL I: ICD-10-PCS | Mod: ,,, | Performed by: NURSE PRACTITIONER

## 2019-03-03 PROCEDURE — 93010 ELECTROCARDIOGRAM REPORT: CPT | Mod: 76,,, | Performed by: INTERNAL MEDICINE

## 2019-03-03 PROCEDURE — 99218 PR INITIAL OBSERVATION CARE,LEVL I: CPT | Mod: ,,, | Performed by: NURSE PRACTITIONER

## 2019-03-03 PROCEDURE — 25000003 PHARM REV CODE 250: Performed by: PHYSICIAN ASSISTANT

## 2019-03-03 PROCEDURE — 99284 PR EMERGENCY DEPT VISIT,LEVEL IV: ICD-10-PCS | Mod: ,,, | Performed by: EMERGENCY MEDICINE

## 2019-03-03 PROCEDURE — 84484 ASSAY OF TROPONIN QUANT: CPT | Mod: 91

## 2019-03-03 PROCEDURE — 83735 ASSAY OF MAGNESIUM: CPT

## 2019-03-03 PROCEDURE — 93010 ELECTROCARDIOGRAM REPORT: CPT | Mod: ,,, | Performed by: INTERNAL MEDICINE

## 2019-03-03 PROCEDURE — 84484 ASSAY OF TROPONIN QUANT: CPT

## 2019-03-03 PROCEDURE — 81003 URINALYSIS AUTO W/O SCOPE: CPT

## 2019-03-03 PROCEDURE — 93010 EKG 12-LEAD: ICD-10-PCS | Mod: ,,, | Performed by: INTERNAL MEDICINE

## 2019-03-03 RX ORDER — ACETAMINOPHEN 325 MG/1
650 TABLET ORAL EVERY 6 HOURS PRN
Status: DISCONTINUED | OUTPATIENT
Start: 2019-03-03 | End: 2019-03-04 | Stop reason: HOSPADM

## 2019-03-03 RX ORDER — ONDANSETRON 8 MG/1
8 TABLET, ORALLY DISINTEGRATING ORAL EVERY 8 HOURS PRN
Status: DISCONTINUED | OUTPATIENT
Start: 2019-03-03 | End: 2019-03-04 | Stop reason: HOSPADM

## 2019-03-03 RX ORDER — SODIUM CHLORIDE 0.9 % (FLUSH) 0.9 %
5 SYRINGE (ML) INJECTION
Status: DISCONTINUED | OUTPATIENT
Start: 2019-03-03 | End: 2019-03-04 | Stop reason: HOSPADM

## 2019-03-03 RX ORDER — PANTOPRAZOLE SODIUM 40 MG/1
40 TABLET, DELAYED RELEASE ORAL DAILY
Status: DISCONTINUED | OUTPATIENT
Start: 2019-03-04 | End: 2019-03-04 | Stop reason: HOSPADM

## 2019-03-03 RX ORDER — ONDANSETRON 2 MG/ML
4 INJECTION INTRAMUSCULAR; INTRAVENOUS EVERY 8 HOURS PRN
Status: DISCONTINUED | OUTPATIENT
Start: 2019-03-03 | End: 2019-03-04 | Stop reason: HOSPADM

## 2019-03-03 RX ORDER — SODIUM CHLORIDE 0.9 % (FLUSH) 0.9 %
3 SYRINGE (ML) INJECTION EVERY 8 HOURS
Status: DISCONTINUED | OUTPATIENT
Start: 2019-03-03 | End: 2019-03-04 | Stop reason: HOSPADM

## 2019-03-03 RX ADMIN — ALUMINUM HYDROXIDE, MAGNESIUM HYDROXIDE, AND SIMETHICONE 50 ML: 200; 200; 20 SUSPENSION ORAL at 09:03

## 2019-03-03 RX ADMIN — Medication 3 ML: at 10:03

## 2019-03-03 NOTE — H&P
"[facility] Hospital Medicine  History & Physical    Patient Name: Lola Stevens Jr.  MRN: [id number]  Admission Date: 3/3/2019  Attending Physician: Richy Mckeon MD   Primary Care Provider: Estiven Worthington MD    [facility] Medicine Team: [facility] MED F Alondra Londono NP     Patient information was obtained from patient, past medical records and ER records.     Subjective:     Principal Problem:Pre-syncope    Chief Complaint:   Chief Complaint   Patient presents with    Chest Pain     no cardiac history - reports SOB, nausea         HPI: Patient, PMHx of GERD, is admitted to hospital medicine for a presyncope workup. Patient states for the past 2 weeks he has had transient episodes of chest tightness, shortness of breath, numbness and tingling down bilateral upper and lower extremities, diaphoresis, and tunnel vision. Patient denies history of hypoglycemia that he knows of. Patient denies chest pain. States the shortness of breath feels more "restrictive" like he "can't take a deep breath" but is not painful. Patient has recently been treated for anxiety with xanax which did not improve his symptoms. Patient was to receive outpatient stress test for abnormal T waves seen on EKG. Patient also endorses testicular pain and a full sensation. Patient states he has prostate problems but is unaware of any testicular problems. Patient states he does not feel a bulging in his scrotum but does have a sensation of swelling.    In ED: Labwork unremarkable. EKG NSR with sinus arrhythmia     Past Medical History:   Diagnosis Date    Diverticulosis     GERD (gastroesophageal reflux disease)     Hemorrhoids        Past Surgical History:   Procedure Laterality Date    CHOLECYSTECTOMY      COLONOSCOPY      COLONOSCOPY N/A 10/17/2018    [facility] (4TH FLR)    EGD (ESOPHAGOGASTRODUODENOSCOPY) N/A 10/17/2018    [facility] at [facility] ENDO (4TH FLR) "    EGD (ESOPHAGOGASTRODUODENOSCOPY) N/A 2013    [facility]formed [facility] at [facility] ENDO    ESOPHAGOGASTRODUODENOSCOPY         Review of patient's allergies indicates:  No Known Allergies    No current facility-administered medications on file prior to encounter.      Current Outpatient Medications on File Prior to Encounter   Medication Sig    ALPRAZolam (XANAX) 0.5 MG tablet Take 1 tablet (0.5 mg total) by mouth 3 (three) times daily.    ciprofloxacin HCl (CIPRO) 500 MG tablet Take 1 tablet (500 mg total) by mouth every 12 (twelve) hours.    pantoprazole (PROTONIX) 40 MG tablet Take 1 tablet by mouth once daily.     Family History     Problem Relation (Age of Onset)    Heart disease Father        Tobacco Use    Smoking status: Former Smoker     Packs/day: 1.00     Years: 15.00     Pack years: 15.00     Types: Cigarettes     Last attempt to quit: 10/17/2002     Years since quittin.3    Smokeless tobacco: Former User     Quit date: 2018    Tobacco comment: quit smoking 15 years ago   Substance and Sexual Activity    Alcohol use: Yes     Comment: on weekends    Drug use: No    Sexual activity: Not on file     Review of Systems   Constitutional: Positive for diaphoresis. Negative for appetite change, chills, fatigue, fever and unexpected weight change.   Respiratory: Positive for chest tightness and shortness of breath. Negative for apnea, cough and wheezing.    Cardiovascular: Positive for palpitations. Negative for chest pain and leg swelling.   Gastrointestinal: Positive for abdominal pain (chronic) and nausea. Negative for abdominal distention, constipation, diarrhea and vomiting.   Genitourinary: Positive for difficulty urinating, scrotal swelling and testicular pain. Negative for decreased urine volume, discharge, dysuria, flank pain, frequency, hematuria, penile pain, penile swelling and urgency.   Musculoskeletal: Positive for back pain (chronic). Negative for arthralgias,  gait problem, neck pain and neck stiffness.   Skin: Negative for color change and rash.   Neurological: Positive for dizziness, light-headedness and numbness. Negative for syncope, weakness and headaches.   Psychiatric/Behavioral: Negative for agitation, behavioral problems, confusion and decreased concentration.     Objective:     Vital Signs (Most Recent):  Temp: 97.8 °F (36.6 °C) (03/03/19 1553)  Pulse: 70 (03/03/19 1554)  Resp: 20 (03/03/19 1314)  BP: 135/89 (03/03/19 1554)  SpO2: 97 % (03/03/19 1554) Vital Signs (24h Range):  Temp:  [97.4 °F (36.3 °C)-97.8 °F (36.6 °C)] 97.8 °F (36.6 °C)  Pulse:  [70-81] 70  Resp:  [20] 20  SpO2:  [97 %-99 %] 97 %  BP: (125-164)/(86-94) 135/89     Weight: 93 kg (205 lb)  Body mass index is 29.41 kg/m².    Physical Exam   Constitutional: He is oriented to person, place, and time. He appears well-developed and well-nourished. No distress.   HENT:   Head: Normocephalic and atraumatic.   Mouth/Throat: Mucous membranes are normal.   Eyes: Conjunctivae and EOM are normal. Pupils are equal, round, and reactive to light.   Neck: Trachea normal and normal range of motion. Neck supple. No JVD present.   Cardiovascular: Normal rate, regular rhythm, normal heart sounds and intact distal pulses. Exam reveals no gallop and no friction rub.   No murmur heard.  Pulmonary/Chest: Effort normal and breath sounds normal. No stridor. No respiratory distress. He has no wheezes. He has no rhonchi. He has no rales. He exhibits no tenderness.   Abdominal: Soft. Bowel sounds are normal. He exhibits no distension and no ascites. There is no tenderness.   Musculoskeletal: Normal range of motion. He exhibits no edema or deformity.   Neurological: He is alert and oriented to person, place, and time. No cranial nerve deficit.   Skin: Skin is warm, dry and intact. Capillary refill takes less than 2 seconds. No rash noted. He is not diaphoretic. No cyanosis or erythema. Nails show no clubbing.   Psychiatric:  He has a normal mood and affect. His speech is normal and behavior is normal. Judgment and thought content normal. Cognition and memory are normal.   Nursing note and vitals reviewed.        CRANIAL NERVES     CN III, IV, VI   Pupils are equal, round, and reactive to light.  Extraocular motions are normal.        Significant Labs:   CBC:   Recent Labs   Lab 03/03/19  1357   WBC 6.64   HGB 16.1   HCT 44.9        CMP:   Recent Labs   Lab 03/03/19  1357      K 4.6      CO2 20*   GLU 88   BUN 13   CREATININE 0.9   CALCIUM 9.6   PROT 7.4   ALBUMIN 4.3   BILITOT 0.7   ALKPHOS 60   AST 23   ALT 24   ANIONGAP 12   EGFRNONAA >60.0     Troponin:   Recent Labs   Lab 03/03/19  1357   TROPONINI 0.006     TSH:   Recent Labs   Lab 03/03/19  1357   TSH 1.421       Significant Imaging: I have reviewed all pertinent imaging results/findings within the past 24 hours.    Assessment/Plan:     * Pre-syncope    - hypoglycemia vs arrhythmia  - POCT glucose AC/HS  - CXR pending  - Urinalysis pending  - Lipid panel pending  - initial troponin negative, will trend  - stress test pending  - tele       Prostatitis    - has follow up outpatient  - recently on cipro   - will consider flomax on discharge       Testicular pain    - pain and swelling sensation  -US pending       GERD (gastroesophageal reflux disease)    - continue PPI         VTE Risk Mitigation (From admission, onward)        Ordered     IP VTE LOW RISK PATIENT  Once      03/03/19 1637     Place GERMÁN hose  Until discontinued      03/03/19 1637             Alondra Londono NP  Department of Hospital Medicine   [facility]

## 2019-03-03 NOTE — HPI
"Patient, PMHx of GERD, is admitted to hospital medicine for a presyncope workup. Patient states for the past 2 weeks he has had transient episodes of chest tightness, shortness of breath, numbness and tingling down bilateral upper and lower extremities, diaphoresis, and tunnel vision. Patient denies history of hypoglycemia that he knows of. Patient denies chest pain. States the shortness of breath feels more "restrictive" like he "can't take a deep breath" but is not painful. Patient has recently been treated for anxiety with xanax which did not improve his symptoms. Patient was to receive outpatient stress test for abnormal T waves seen on EKG. Patient also endorses testicular pain and a full sensation. Patient states he has prostate problems but is unaware of any testicular problems. Patient states he does not feel a bulging in his scrotum but does have a sensation of swelling.     In ED: Labwork unremarkable. EKG NSR with sinus arrhythmia   "

## 2019-03-03 NOTE — ED TRIAGE NOTES
Patient presents to the ED with a complaint of chest pain that started around 11am today and quickly resolved, patient states that pain starts in the epigastric area and then radiates to the left chest wall, with associated symptoms of nausea with no vomiting, patient states that this has been going on for a while, has an appointment scheduled with a cardiologist later this month. Patient placed in room, connected to cardiac monitor, patient awake alert and oriented states that chest pain has gone away.

## 2019-03-03 NOTE — ASSESSMENT & PLAN NOTE
- hypoglycemia vs arrhythmia  - POCT glucose AC/HS  - CXR pending  - Urinalysis pending  - Lipid panel pending  - initial troponin negative, will trend  - stress test pending  - tele

## 2019-03-03 NOTE — SUBJECTIVE & OBJECTIVE
Past Medical History:   Diagnosis Date    Diverticulosis     GERD (gastroesophageal reflux disease)     Hemorrhoids        Past Surgical History:   Procedure Laterality Date    CHOLECYSTECTOMY      COLONOSCOPY      COLONOSCOPY N/A 10/17/2018    Performed by Cinda Davey MD at [facility] (4TH FLR)    EGD (ESOPHAGOGASTRODUODENOSCOPY) N/A 10/17/2018    Performed by Cinda Davey MD at [facility] (4TH FLR)    EGD (ESOPHAGOGASTRODUODENOSCOPY) N/A 2013    Performed by Court [facility]os [facility] at [facility]    ESOPHAGOGASTRODUODENOSCOPY         Review of patient's allergies indicates:  No Known Allergies    No current facility-administered medications on file prior to encounter.      Current Outpatient Medications on File Prior to Encounter   Medication Sig    ALPRAZolam (XANAX) 0.5 MG tablet Take 1 tablet (0.5 mg total) by mouth 3 (three) times daily.    ciprofloxacin HCl (CIPRO) 500 MG tablet Take 1 tablet (500 mg total) by mouth every 12 (twelve) hours.    pantoprazole (PROTONIX) 40 MG tablet Take 1 tablet by mouth once daily.     Family History     Problem Relation (Age of Onset)    Heart disease Father        Tobacco Use    Smoking status: Former Smoker     Packs/day: 1.00     Years: 15.00     Pack years: 15.00     Types: Cigarettes     Last attempt to quit: 10/17/2002     Years since quittin.3    Smokeless tobacco: Former User     Quit date: 2018    Tobacco comment: quit smoking 15 years ago   Substance and Sexual Activity    Alcohol use: Yes     Comment: on weekends    Drug use: No    Sexual activity: Not on file     Review of Systems   Constitutional: Positive for diaphoresis. Negative for appetite change, chills, fatigue, fever and unexpected weight change.   Respiratory: Positive for chest tightness and shortness of breath. Negative for apnea, cough and wheezing.    Cardiovascular: Positive for palpitations. Negative for chest pain and leg swelling.   Gastrointestinal:  Positive for abdominal pain (chronic) and nausea. Negative for abdominal distention, constipation, diarrhea and vomiting.   Genitourinary: Positive for difficulty urinating, scrotal swelling and testicular pain. Negative for decreased urine volume, discharge, dysuria, flank pain, frequency, hematuria, penile pain, penile swelling and urgency.   Musculoskeletal: Positive for back pain (chronic). Negative for arthralgias, gait problem, neck pain and neck stiffness.   Skin: Negative for color change and rash.   Neurological: Positive for dizziness, light-headedness and numbness. Negative for syncope, weakness and headaches.   Psychiatric/Behavioral: Negative for agitation, behavioral problems, confusion and decreased concentration.     Objective:     Vital Signs (Most Recent):  Temp: 97.8 °F (36.6 °C) (03/03/19 1553)  Pulse: 70 (03/03/19 1554)  Resp: 20 (03/03/19 1314)  BP: 135/89 (03/03/19 1554)  SpO2: 97 % (03/03/19 1554) Vital Signs (24h Range):  Temp:  [97.4 °F (36.3 °C)-97.8 °F (36.6 °C)] 97.8 °F (36.6 °C)  Pulse:  [70-81] 70  Resp:  [20] 20  SpO2:  [97 %-99 %] 97 %  BP: (125-164)/(86-94) 135/89     Weight: 93 kg (205 lb)  Body mass index is 29.41 kg/m².    Physical Exam   Constitutional: He is oriented to person, place, and time. He appears well-developed and well-nourished. No distress.   HENT:   Head: Normocephalic and atraumatic.   Mouth/Throat: Mucous membranes are normal.   Eyes: Conjunctivae and EOM are normal. Pupils are equal, round, and reactive to light.   Neck: Trachea normal and normal range of motion. Neck supple. No JVD present.   Cardiovascular: Normal rate, regular rhythm, normal heart sounds and intact distal pulses. Exam reveals no gallop and no friction rub.   No murmur heard.  Pulmonary/Chest: Effort normal and breath sounds normal. No stridor. No respiratory distress. He has no wheezes. He has no rhonchi. He has no rales. He exhibits no tenderness.   Abdominal: Soft. Bowel sounds are normal.  He exhibits no distension and no ascites. There is no tenderness.   Musculoskeletal: Normal range of motion. He exhibits no edema or deformity.   Neurological: He is alert and oriented to person, place, and time. No cranial nerve deficit.   Skin: Skin is warm, dry and intact. Capillary refill takes less than 2 seconds. No rash noted. He is not diaphoretic. No cyanosis or erythema. Nails show no clubbing.   Psychiatric: He has a normal mood and affect. His speech is normal and behavior is normal. Judgment and thought content normal. Cognition and memory are normal.   Nursing note and vitals reviewed.        CRANIAL NERVES     CN III, IV, VI   Pupils are equal, round, and reactive to light.  Extraocular motions are normal.        Significant Labs:   CBC:   Recent Labs   Lab 03/03/19  1357   WBC 6.64   HGB 16.1   HCT 44.9        CMP:   Recent Labs   Lab 03/03/19  1357      K 4.6      CO2 20*   GLU 88   BUN 13   CREATININE 0.9   CALCIUM 9.6   PROT 7.4   ALBUMIN 4.3   BILITOT 0.7   ALKPHOS 60   AST 23   ALT 24   ANIONGAP 12   EGFRNONAA >60.0     Troponin:   Recent Labs   Lab 03/03/19  1357   TROPONINI 0.006     TSH:   Recent Labs   Lab 03/03/19  1357   TSH 1.421       Significant Imaging: I have reviewed all pertinent imaging results/findings within the past 24 hours.

## 2019-03-03 NOTE — ED PROVIDER NOTES
Encounter Date: 3/3/2019    SCRIBE #1 NOTE: I, Kolton Yeager, am scribing for, and in the presence of,  Richy Mckeon MD. I have scribed the following portions of the note - the Resident attestation.       History     Chief Complaint   Patient presents with    Chest Pain     no cardiac history - reports SOB, nausea      Mr. Stevens is a pleasant 49 yo gentleman w/o any significant past medical history who presents to the ED c/o light headedness.  Per patient for the last several weeks he has been having transient episodes of lightheadedness associated with chest discomfort and palpitations.  He previously had an EKG with T wave abnormalities and is scheduled for an outpatient stress test, but otherwise he states that he no significant problems.  He states his chest pain actually improves with exertion.          Review of patient's allergies indicates:  No Known Allergies  Past Medical History:   Diagnosis Date    Diverticulosis     GERD (gastroesophageal reflux disease)     Hemorrhoids      Past Surgical History:   Procedure Laterality Date    CHOLECYSTECTOMY      COLONOSCOPY      COLONOSCOPY N/A 10/17/2018    Performed by [facility] MD at [facility] (4TH FLR)    EGD (ESOPHAGOGASTRODUODENOSCOPY) N/A 10/17/2018    Performed by Cinda Davey MD at [facility] END[facility] (4TH FLR)    EGD (ESOPHAGOGASTRODUODENOSCOPY) N/A 2013    Performed by Court Amos MD at [facility]    ESOPHAGOGASTRODUODENOSCOPY       Family History   Problem Relation Age of Onset    Heart disease Father     Colon cancer Neg Hx     Esophageal cancer Neg Hx     Stomach cancer Neg Hx     Ulcerative colitis Neg Hx     Crohn's disease Neg Hx     Celiac disease Neg Hx     Irritable bowel syndrome Neg Hx      Social History     Tobacco Use    Smoking status: Former Smoker     Packs/day: 1.00     Years: 15.00     Pack years: 15.00     Types: Cigarettes     Last attempt to quit: 10/17/2002     Years since quittin.3     Smokeless tobacco: Former User     Quit date: 5/23/2018    Tobacco comment: quit smoking 15 years ago   Substance Use Topics    Alcohol use: Yes     Comment: on weekends    Drug use: No     Review of Systems   Constitutional: Negative for chills and fever.   Respiratory: Negative for cough and shortness of breath.    Cardiovascular: Negative for chest pain and palpitations.   Gastrointestinal: Negative for constipation, diarrhea, nausea and vomiting.       Physical Exam     Initial Vitals [03/03/19 1314]   BP Pulse Resp Temp SpO2   (!) 164/94 81 20 97.4 °F (36.3 °C) 99 %      MAP       --         Physical Exam    Constitutional: He appears well-developed and well-nourished.   HENT:   Head: Normocephalic and atraumatic.   Eyes: Conjunctivae and EOM are normal.   Neck: Neck supple.   Cardiovascular: Normal rate, regular rhythm and normal heart sounds.   No murmur heard.  Pulmonary/Chest: Breath sounds normal. No respiratory distress.   Abdominal: Soft. There is no tenderness.   Musculoskeletal: Normal range of motion. He exhibits no edema.   Neurological: He is alert and oriented to person, place, and time. He has normal strength.   Skin: Skin is warm and dry.   Psychiatric: He has a normal mood and affect. Thought content normal.         ED Course   Procedures  Labs Reviewed - No data to display  EKG Readings: (Independently Interpreted)   Initial Reading: No STEMI.   NSR       Imaging Results    None          Medical Decision Making:   History:   Old Medical Records: I decided to obtain old medical records.  Initial Assessment:   49 yo gentleman w/ palpitations  Differential Diagnosis:   DDx includes ACS vs anxiety vs symptomatic arrhythmia  Independently Interpreted Test(s):   I have ordered and independently interpreted EKG Reading(s) - see prior notes  Clinical Tests:   Lab Tests: Ordered and Reviewed  Medical Tests: Reviewed and Ordered  ED Management:  3:26 PM  All labs wnl and no evidence of acute coronary  event.  Suspect his is having a symptomatic arrhythmia and will place in observation with hospital medicine.            Scribe Attestation:   Scribe #1: I performed the above scribed service and the documentation accurately describes the services I performed. I attest to the accuracy of the note.    Attending Attestation:   Physician Attestation Statement for Resident:  As the supervising MD   Physician Attestation Statement: I have personally seen and examined this patient.   I agree with the above history. -:   As the supervising MD I agree with the above PE.    As the supervising MD I agree with the above treatment, course, plan, and disposition.                       Clinical Impression:       ICD-10-CM ICD-9-CM   1. Pre-syncope R55 780.2   2. Chest pain R07.9 786.50         Disposition:   Disposition: Placed in Observation  Condition: Stable                        Yury Rosen MD  Resident  03/03/19 1520       Yury Rosen MD  Resident  03/03/19 1527       Richy Mckeon MD  03/03/19 1614       Richy Mckeon MD  03/25/19 0720

## 2019-03-04 ENCOUNTER — NURSE TRIAGE (OUTPATIENT)
Dept: ADMINISTRATIVE | Facility: CLINIC | Age: 49
End: 2019-03-04

## 2019-03-04 VITALS
OXYGEN SATURATION: 96 % | RESPIRATION RATE: 17 BRPM | HEIGHT: 70 IN | BODY MASS INDEX: 28.2 KG/M2 | TEMPERATURE: 99 F | HEART RATE: 71 BPM | DIASTOLIC BLOOD PRESSURE: 77 MMHG | WEIGHT: 197 LBS | SYSTOLIC BLOOD PRESSURE: 113 MMHG

## 2019-03-04 LAB
ALBUMIN SERPL BCP-MCNC: 3.7 G/DL
ALP SERPL-CCNC: 56 U/L
ALT SERPL W/O P-5'-P-CCNC: 21 U/L
ANION GAP SERPL CALC-SCNC: 9 MMOL/L
ASCENDING AORTA: 2.89 CM
AST SERPL-CCNC: 17 U/L
BASOPHILS # BLD AUTO: 0.03 K/UL
BASOPHILS NFR BLD: 0.5 %
BILIRUB SERPL-MCNC: 0.7 MG/DL
BSA FOR ECHO PROCEDURE: 2.1 M2
BUN SERPL-MCNC: 20 MG/DL
CALCIUM SERPL-MCNC: 9.6 MG/DL
CHLORIDE SERPL-SCNC: 108 MMOL/L
CHOLEST SERPL-MCNC: 213 MG/DL
CHOLEST/HDLC SERPL: 5.2 {RATIO}
CO2 SERPL-SCNC: 23 MMOL/L
CREAT SERPL-MCNC: 1.1 MG/DL
CV ECHO LV RWT: 0.53 CM
CV STRESS BASE HR: 69 BPM
DIASTOLIC BLOOD PRESSURE: 85 MMHG
DIFFERENTIAL METHOD: NORMAL
DOP CALC LVOT AREA: 2.92 CM2
DOP CALC LVOT DIAMETER: 1.93 CM
DOP CALC LVOT PEAK VEL: 1.49 M/S
DOP CALC LVOT STROKE VOLUME: 85.65 CM3
DOP CALCLVOT PEAK VEL VTI: 29.29 CM
E WAVE DECELERATION TIME: 164.55 MSEC
E/A RATIO: 1.07
E/E' RATIO: 9.67
ECHO LV POSTERIOR WALL: 1.05 CM (ref 0.6–1.1)
EOSINOPHIL # BLD AUTO: 0.2 K/UL
EOSINOPHIL NFR BLD: 3 %
ERYTHROCYTE [DISTWIDTH] IN BLOOD BY AUTOMATED COUNT: 11.7 %
EST. GFR  (AFRICAN AMERICAN): >60 ML/MIN/1.73 M^2
EST. GFR  (NON AFRICAN AMERICAN): >60 ML/MIN/1.73 M^2
ESTIMATED AVG GLUCOSE: 100 MG/DL
FRACTIONAL SHORTENING: 37 % (ref 28–44)
GLUCOSE SERPL-MCNC: 95 MG/DL
HBA1C MFR BLD HPLC: 5.1 %
HCT VFR BLD AUTO: 45.1 %
HDLC SERPL-MCNC: 41 MG/DL
HDLC SERPL: 19.2 %
HGB BLD-MCNC: 15.5 G/DL
IMM GRANULOCYTES # BLD AUTO: 0.01 K/UL
IMM GRANULOCYTES NFR BLD AUTO: 0.2 %
INTERVENTRICULAR SEPTUM: 0.87 CM (ref 0.6–1.1)
IVRT: 0.09 MSEC
LA MAJOR: 4.76 CM
LA MINOR: 4.79 CM
LA WIDTH: 3.83 CM
LDLC SERPL CALC-MCNC: 134.6 MG/DL
LEFT ATRIUM SIZE: 3.33 CM
LEFT ATRIUM VOLUME INDEX: 25 ML/M2
LEFT ATRIUM VOLUME: 51.76 CM3
LEFT INTERNAL DIMENSION IN SYSTOLE: 2.53 CM (ref 2.1–4)
LEFT VENTRICLE DIASTOLIC VOLUME INDEX: 29.32 ML/M2
LEFT VENTRICLE DIASTOLIC VOLUME: 60.8 ML
LEFT VENTRICLE MASS INDEX: 57.8 G/M2
LEFT VENTRICLE SYSTOLIC VOLUME INDEX: 11.1 ML/M2
LEFT VENTRICLE SYSTOLIC VOLUME: 23.09 ML
LEFT VENTRICULAR INTERNAL DIMENSION IN DIASTOLE: 4 CM (ref 3.5–6)
LEFT VENTRICULAR MASS: 119.97 G
LV LATERAL E/E' RATIO: 8.7
LV SEPTAL E/E' RATIO: 10.88
LYMPHOCYTES # BLD AUTO: 1.9 K/UL
LYMPHOCYTES NFR BLD: 34 %
MAGNESIUM SERPL-MCNC: 2.7 MG/DL
MCH RBC QN AUTO: 30.2 PG
MCHC RBC AUTO-ENTMCNC: 34.4 G/DL
MCV RBC AUTO: 88 FL
MONOCYTES # BLD AUTO: 0.5 K/UL
MONOCYTES NFR BLD: 8.2 %
MV PEAK A VEL: 0.81 M/S
MV PEAK E VEL: 0.87 M/S
NEUTROPHILS # BLD AUTO: 3 K/UL
NEUTROPHILS NFR BLD: 54.1 %
NONHDLC SERPL-MCNC: 172 MG/DL
NRBC BLD-RTO: 0 /100 WBC
OHS CV CPX 1 MINUTE RECOVERY HEART RATE: 150 BPM
OHS CV CPX 85 PERCENT MAX PREDICTED HEART RATE MALE: 146
OHS CV CPX ESTIMATED METS: 15
OHS CV CPX MAX PREDICTED HEART RATE: 172
OHS CV CPX PATIENT IS FEMALE: 0
OHS CV CPX PATIENT IS MALE: 1
OHS CV CPX PEAK DIASTOLIC BLOOD PRESSURE: 76 MMHG
OHS CV CPX PEAK HEAR RATE: 173 BPM
OHS CV CPX PEAK RATE PRESSURE PRODUCT: NORMAL
OHS CV CPX PEAK SYSTOLIC BLOOD PRESSURE: 188 MMHG
OHS CV CPX PERCENT MAX PREDICTED HEART RATE ACHIEVED: 101
OHS CV CPX PERCENT TARGET HEART RATE ACHIEVED: 118.49
OHS CV CPX RATE PRESSURE PRODUCT PRESENTING: 9039
OHS CV CPX TARGET HEART RATE: 146
PHOSPHATE SERPL-MCNC: 3.8 MG/DL
PLATELET # BLD AUTO: 210 K/UL
PMV BLD AUTO: 9.5 FL
POCT GLUCOSE: 105 MG/DL (ref 70–110)
POCT GLUCOSE: 98 MG/DL (ref 70–110)
POTASSIUM SERPL-SCNC: 4.9 MMOL/L
PROT SERPL-MCNC: 6.5 G/DL
PULM VEIN S/D RATIO: 1.1
PV PEAK D VEL: 0.51 M/S
PV PEAK S VEL: 0.56 M/S
RA MAJOR: 4.62 CM
RA WIDTH: 3.39 CM
RBC # BLD AUTO: 5.13 M/UL
RIGHT VENTRICULAR END-DIASTOLIC DIMENSION: 3.13 CM
RV TISSUE DOPPLER FREE WALL SYSTOLIC VELOCITY 1 (APICAL 4 CHAMBER VIEW): 13.4 M/S
SINUS: 3.04 CM
SODIUM SERPL-SCNC: 140 MMOL/L
STJ: 2.56 CM
STRESS ECHO POST EXERCISE DUR MIN: 8 MIN
STRESS ECHO POST EXERCISE DUR SEC: 31
SYSTOLIC BLOOD PRESSURE: 131 MMHG
TDI LATERAL: 0.1
TDI SEPTAL: 0.08
TDI: 0.09
TRICUSPID ANNULAR PLANE SYSTOLIC EXCURSION: 2.02 CM
TRIGL SERPL-MCNC: 187 MG/DL
TROPONIN I SERPL DL<=0.01 NG/ML-MCNC: <0.006 NG/ML
WBC # BLD AUTO: 5.61 K/UL

## 2019-03-04 PROCEDURE — 36415 COLL VENOUS BLD VENIPUNCTURE: CPT

## 2019-03-04 PROCEDURE — 80053 COMPREHEN METABOLIC PANEL: CPT

## 2019-03-04 PROCEDURE — 85025 COMPLETE CBC W/AUTO DIFF WBC: CPT

## 2019-03-04 PROCEDURE — A4216 STERILE WATER/SALINE, 10 ML: HCPCS | Performed by: NURSE PRACTITIONER

## 2019-03-04 PROCEDURE — 25000003 PHARM REV CODE 250: Performed by: NURSE PRACTITIONER

## 2019-03-04 PROCEDURE — 84100 ASSAY OF PHOSPHORUS: CPT

## 2019-03-04 PROCEDURE — 99217 PR OBSERVATION CARE DISCHARGE: CPT | Mod: ,,, | Performed by: NURSE PRACTITIONER

## 2019-03-04 PROCEDURE — 99217 PR OBSERVATION CARE DISCHARGE: ICD-10-PCS | Mod: ,,, | Performed by: NURSE PRACTITIONER

## 2019-03-04 PROCEDURE — 80061 LIPID PANEL: CPT

## 2019-03-04 PROCEDURE — 83036 HEMOGLOBIN GLYCOSYLATED A1C: CPT

## 2019-03-04 PROCEDURE — 83735 ASSAY OF MAGNESIUM: CPT

## 2019-03-04 PROCEDURE — G0378 HOSPITAL OBSERVATION PER HR: HCPCS

## 2019-03-04 RX ORDER — TAMSULOSIN HYDROCHLORIDE 0.4 MG/1
0.4 CAPSULE ORAL DAILY
Qty: 30 CAPSULE | Refills: 11 | Status: SHIPPED | OUTPATIENT
Start: 2019-03-04 | End: 2020-06-11

## 2019-03-04 RX ORDER — TAMSULOSIN HYDROCHLORIDE 0.4 MG/1
0.4 CAPSULE ORAL DAILY
Qty: 30 CAPSULE | Refills: 11 | Status: SHIPPED | OUTPATIENT
Start: 2019-03-04 | End: 2019-03-04

## 2019-03-04 RX ORDER — TAMSULOSIN HYDROCHLORIDE 0.4 MG/1
0.4 CAPSULE ORAL DAILY
Status: DISCONTINUED | OUTPATIENT
Start: 2019-03-04 | End: 2019-03-04 | Stop reason: HOSPADM

## 2019-03-04 RX ORDER — PANTOPRAZOLE SODIUM 40 MG/1
40 TABLET, DELAYED RELEASE ORAL DAILY
Qty: 30 TABLET | Refills: 1 | Status: SHIPPED | OUTPATIENT
Start: 2019-03-05 | End: 2019-05-24 | Stop reason: SDUPTHER

## 2019-03-04 RX ORDER — PANTOPRAZOLE SODIUM 40 MG/1
40 TABLET, DELAYED RELEASE ORAL DAILY
Qty: 30 TABLET | Refills: 1 | Status: SHIPPED | OUTPATIENT
Start: 2019-03-05 | End: 2019-03-04

## 2019-03-04 RX ADMIN — Medication 3 ML: at 09:03

## 2019-03-04 RX ADMIN — PANTOPRAZOLE SODIUM 40 MG: 40 TABLET, DELAYED RELEASE ORAL at 09:03

## 2019-03-04 RX ADMIN — ALUMINUM HYDROXIDE, MAGNESIUM HYDROXIDE, AND SIMETHICONE 50 ML: 200; 200; 20 SUSPENSION ORAL at 12:03

## 2019-03-04 RX ADMIN — TAMSULOSIN HYDROCHLORIDE 0.4 MG: 0.4 CAPSULE ORAL at 12:03

## 2019-03-04 NOTE — PLAN OF CARE
03/04/19 1551   Final Note   Assessment Type Final Discharge Note     Patient discharged home with no needs on 3/4/19.

## 2019-03-04 NOTE — NURSING
"Patient reports ," I am having heartburn, my gut hurting."  Patient VS stable[ refer to flow sheet].  Patient is not diaphoretic and no chest pain, but feels SOB.  O2 SAT 98 % with + capillary refills.  Pantoprazole EC 40 mg ordered daily.  Spouse at bedside and reported, " please make sure he does not get any xanax, his PCP prescribed it and he's too sensitive to it."  IMF paged.   "

## 2019-03-04 NOTE — PLAN OF CARE
Problem: Adult Inpatient Plan of Care  Goal: Plan of Care Review  Outcome: Ongoing (interventions implemented as appropriate)   03/04/19 0487   Plan of Care Review   Plan of Care Reviewed With patient;spouse   Progress improving     Patient is AAO x 4 and denies pain.  Ambulates to the toilet.  Patient reported heartburn. New order received for G I cocktail and patient reported relief.  NPO after MN for scheduled stress treat in am.  Patient and spouse verbalizes an understanding of being NPO.  Nonskid socks to feet.  Resting well, easy to arouse.Dischrge planning is home with family support..

## 2019-03-04 NOTE — NURSING
Pt in bed resting at this time. No distress or CP noted at this time. Pt a/o x4 and ambulatory. Went for stress test today. Pt denies any pain at this time, received ordered GI cocktail x1 dose and is on Protonix, states previously taking at home. No distress noted on RA unlabored at this time with recent VSS. New order in place for pt d/c home, spouse at bedside. Pt PIV removed per protocol. Follow ups and medications discussed. Pt states current prescription of Protonix empty, will discuss with provider about refill for home. Belongings at bedside. Pt education performed with verbalized understanding. Will follow up and d/c as ordered.

## 2019-03-04 NOTE — ASSESSMENT & PLAN NOTE
- hypoglycemia vs arrhythmia vs GERD  - POCT glucose AC/HS-normoglycemic  -a1c 5.1  - CXR nonacute  - Urinalysis clean  - Lipid panel- cholesterol 213 and triglycerides 187-diet modification encouraged  -trop neg  - stress test without ischemia or dysfuction  - holter on discharge; cardiology referral sent; protonix refilled

## 2019-03-04 NOTE — HOSPITAL COURSE
Patient admitted for chest pain rule out. Troponin negative. EKG without ischemic changes. Echo stress without ischemia or structural abnormalities. No dysfunction noted. Chest x-ray nonacute. Lipid panel cholesterol and triglycerides elevated; diet modification recommended to patient. A1c 5.1. TSH normal. Patient also had complaints of testicular pain and swelling. US normal. Patient discharged with flomax, protonix, holter monitor, and cardiology follow up.

## 2019-03-04 NOTE — DISCHARGE SUMMARY
"[facility] Hospital Medicine  Discharge Summary      Patient Name: Lola Stevens Jr.  MRN: [id number]  Admission Date: 3/3/2019  Hospital Length of Stay: 0 days  Discharge Date and Time:  03/04/2019 1:09 PM  Attending Physician: Yazmin Scherer MD   Discharging Provider: Alondra Londono NP  Primary Care Provider: Estiven Worthington MD  [facility] Medicine Team: [facility] MED F Alondra Londono NP    HPI:   Patient, PMHx of GERD, is admitted to hospital medicine for a presyncope workup. Patient states for the past 2 weeks he has had transient episodes of chest tightness, shortness of breath, numbness and tingling down bilateral upper and lower extremities, diaphoresis, and tunnel vision. Patient denies history of hypoglycemia that he knows of. Patient denies chest pain. States the shortness of breath feels more "restrictive" like he "can't take a deep breath" but is not painful. Patient has recently been treated for anxiety with xanax which did not improve his symptoms. Patient was to receive outpatient stress test for abnormal T waves seen on EKG. Patient also endorses testicular pain and a full sensation. Patient states he has prostate problems but is unaware of any testicular problems. Patient states he does not feel a bulging in his scrotum but does have a sensation of swelling.     In ED: Labwork unremarkable. EKG NSR with sinus arrhythmia     * No surgery found *      Hospital Course:   Patient admitted for chest pain rule out. Troponin negative. EKG without ischemic changes. Echo stress without ischemia or structural abnormalities. No dysfunction noted. Chest x-ray nonacute. Lipid panel cholesterol and triglycerides elevated; diet modification recommended to patient. A1c 5.1. TSH normal. Patient also had complaints of testicular pain and swelling. US normal. Patient discharged with flomax, protonix, holter monitor, and cardiology follow up.      Consults:     * Pre-syncope    - " hypoglycemia vs arrhythmia vs GERD  - POCT glucose AC/HS-normoglycemic  -a1c 5.1  - CXR nonacute  - Urinalysis clean  - Lipid panel- cholesterol 213 and triglycerides 187-diet modification encouraged  -trop neg  - stress test without ischemia or dysfuction  - holter on discharge; cardiology referral sent; protonix refilled        Prostatitis    - has follow up outpatient  - recently on cipro   - flomax prescribed and stated to follow up with urology and PCP       Testicular pain    - pain and swelling sensation  -US negative       GERD (gastroesophageal reflux disease)    - continue PPI         Final Active Diagnoses:    Diagnosis Date Noted POA    PRINCIPAL PROBLEM:  Pre-syncope [R55] 03/03/2019 Yes    Testicular pain [N50.819] 03/03/2019 Yes    Prostatitis [N41.9] 03/03/2019 Yes    GERD (gastroesophageal reflux disease) [K21.9] 11/12/2013 Yes      Problems Resolved During this Admission:       Discharged Condition: good    Disposition: Home or Self Care    Follow Up:  Follow-up Information     Estiven Worthington MD In 1 week.    Specialty:  Family Medicine  Contact information:  [address]  [facility] [address]  [place] [id number]  [phone]                 Patient Instructions:      Ambulatory referral to Cardiology   Referral Priority: Routine Referral Type: Consultation   Referral Reason: Specialty Services Required   Requested Specialty: Cardiology   Number of Visits Requested: 1     Notify your health care provider if you experience any of the following:  temperature >100.4     Notify your health care provider if you experience any of the following:  redness, tenderness, or signs of infection (pain, swelling, redness, odor or green/yellow discharge around incision site)     Notify your health care provider if you experience any of the following:  persistent dizziness, light-headedness, or visual disturbances     Notify your health care provider if you experience any of the following:  increased  confusion or weakness     Cardiac event monitor (Cupid Only)   Standing Status: Future Standing Exp. Date: 03/04/20     Order Specific Question Answer Comments   Cardiac Event Monitor Auto Trigger      Activity as tolerated       Significant Diagnostic Studies: Labs: All labs within the past 24 hours have been reviewed    Pending Diagnostic Studies:     None         Medications:  Reconciled Home Medications:      Medication List      START taking these medications    tamsulosin 0.4 mg Cap  Commonly known as:  FLOMAX  Take 1 capsule (0.4 mg total) by mouth once daily.        CHANGE how you take these medications    * pantoprazole 40 MG tablet  Commonly known as:  PROTONIX  Take 1 tablet by mouth once daily.  What changed:  Another medication with the same name was added. Make sure you understand how and when to take each.     * pantoprazole 40 MG tablet  Commonly known as:  PROTONIX  Take 1 tablet (40 mg total) by mouth once daily.  Start taking on:  3/5/2019  What changed:  You were already taking a medication with the same name, and this prescription was added. Make sure you understand how and when to take each.         * This list has 2 medication(s) that are the same as other medications prescribed for you. Read the directions carefully, and ask your doctor or other care provider to review them with you.            CONTINUE taking these medications    ALPRAZolam 0.5 MG tablet  Commonly known as:  XANAX  Take 1 tablet (0.5 mg total) by mouth 3 (three) times daily.     ciprofloxacin HCl 500 MG tablet  Commonly known as:  CIPRO  Take 1 tablet (500 mg total) by mouth every 12 (twelve) hours.            Indwelling Lines/Drains at time of discharge:   Lines/Drains/Airways          None          Time spent on the discharge of patient: >35 minutes  Patient was seen and examined on the date of discharge and determined to be suitable for discharge.         Alondra Londono NP  Department of Hospital Medicine  [facility]

## 2019-03-04 NOTE — DISCHARGE INSTRUCTIONS
Stress test without ischemic changes noted. Echo with normal functioning systolic and diastolic function. With family history of cardiac disease, follow up with cardiology. Referral sent.     Start flomax daily. Follow up with Urology as scheduled and PCP for further management.     Patient should not skip meals throughout the day

## 2019-03-04 NOTE — PLAN OF CARE
03/04/19 1205   Discharge Assessment   Assessment Type Discharge Planning Assessment   Confirmed/corrected address and phone number on facesheet? Yes   Assessment information obtained from? Patient   Expected Length of Stay (days) 1   Communicated expected length of stay with patient/caregiver yes   Prior to hospitilization cognitive status: Alert/Oriented   Prior to hospitalization functional status: Independent   Current cognitive status: Alert/Oriented   Current Functional Status: Independent   Lives With spouse;child(vamshi), adult  (spouse, Maria G Stevens ([phone]), & 26 y.o. son)   Able to Return to Prior Arrangements yes   Is patient able to care for self after discharge? Yes   Patient's perception of discharge disposition home or selfcare   Readmission Within the Last 30 Days no previous admission in last 30 days   Patient currently being followed by outpatient case management? No   Patient currently receives any other outside agency services? No   Equipment Currently Used at Home none   Do you have any problems affording any of your prescribed medications? No   Is the patient taking medications as prescribed? yes   Does the patient have transportation home? Yes   Transportation Anticipated family or friend will provide  (spouse)   Does the patient receive services at the [facility] Clinic? No   Discharge Plan A Home with family   Discharge Plan B Home Health   Patient/Family in Agreement with Plan yes     Dx: pre-syncope  Pharm: [facility]  Hosp f/u appt: Dr. Estiven Worthington (PCP) on 3/15/19 at 1500    Patient wife stated the patient has medical insurance from [employer] (Member ID [id number], Grp #[id number]) effective 3/1/19 but does not have a card yet. CM provided Ch[place] ([id number]) in the admissions office of above information. Will continue to follow.

## 2019-03-04 NOTE — TELEPHONE ENCOUNTER
Reason for Disposition   Caller requesting a refill, no triage required, and triager able to refill per unit policy    Protocols used: ST MEDICATION QUESTION CALL-A-    Patient would like medication to instead be sent to [facility] in [place] on [address]. Sent flomax and protonix as ordered to [facility] as requested.

## 2019-03-04 NOTE — ASSESSMENT & PLAN NOTE
- has follow up outpatient  - recently on cipro   - flomax prescribed and stated to follow up with urology and PCP

## 2019-03-12 ENCOUNTER — OFFICE VISIT (OUTPATIENT)
Dept: UROLOGY | Facility: CLINIC | Age: 49
End: 2019-03-12
Payer: COMMERCIAL

## 2019-03-12 VITALS
DIASTOLIC BLOOD PRESSURE: 94 MMHG | SYSTOLIC BLOOD PRESSURE: 144 MMHG | BODY MASS INDEX: 28.21 KG/M2 | HEIGHT: 70 IN | WEIGHT: 197.06 LBS | HEART RATE: 87 BPM

## 2019-03-12 DIAGNOSIS — N50.82 SCROTAL PAIN: Primary | ICD-10-CM

## 2019-03-12 DIAGNOSIS — R31.29 MICROSCOPIC HEMATURIA: ICD-10-CM

## 2019-03-12 DIAGNOSIS — N50.3 EPIDIDYMAL CYST: ICD-10-CM

## 2019-03-12 DIAGNOSIS — R31.29 OTHER MICROSCOPIC HEMATURIA: ICD-10-CM

## 2019-03-12 DIAGNOSIS — R39.89 BLADDER PAIN: Primary | ICD-10-CM

## 2019-03-12 DIAGNOSIS — N40.0 BENIGN PROSTATIC HYPERPLASIA, UNSPECIFIED WHETHER LOWER URINARY TRACT SYMPTOMS PRESENT: ICD-10-CM

## 2019-03-12 DIAGNOSIS — R39.89 BLADDER PAIN: ICD-10-CM

## 2019-03-12 PROCEDURE — 99204 PR OFFICE/OUTPT VISIT, NEW, LEVL IV, 45-59 MIN: ICD-10-PCS | Mod: 25,S$GLB,, | Performed by: NURSE PRACTITIONER

## 2019-03-12 PROCEDURE — 99999 PR PBB SHADOW E&M-EST. PATIENT-LVL IV: CPT | Mod: PBBFAC,,, | Performed by: NURSE PRACTITIONER

## 2019-03-12 PROCEDURE — 99204 OFFICE O/P NEW MOD 45 MIN: CPT | Mod: 25,S$GLB,, | Performed by: NURSE PRACTITIONER

## 2019-03-12 PROCEDURE — 99999 PR PBB SHADOW E&M-EST. PATIENT-LVL IV: ICD-10-PCS | Mod: PBBFAC,,, | Performed by: NURSE PRACTITIONER

## 2019-03-12 PROCEDURE — 81002 PR URINALYSIS NONAUTO W/O SCOPE: ICD-10-PCS | Mod: S$GLB,,, | Performed by: NURSE PRACTITIONER

## 2019-03-12 PROCEDURE — 81002 URINALYSIS NONAUTO W/O SCOPE: CPT | Mod: S$GLB,,, | Performed by: NURSE PRACTITIONER

## 2019-03-12 NOTE — PATIENT INSTRUCTIONS
Hematuria: Possible Causes     Many things can lead to blood in the urine (hematuria). The blood may be seen with the eye (macroscopic or gross hematuria). Or it may only be seen when the urine is looked at under a microscope (microscopic hematuria). Some of the most common causes of blood in the urine are listed below. Often, no cause for the blood can be found. This is called idiopathic hematuria.  · Kidney or bladder stones are collections of crystals. They form in the urine. Stones may be found anywhere in the urinary tract. But they form most often in the kidneys or bladder. In addition to blood in the urine, they can cause severe pain.  · BPH stands for benign prostatic hyperplasia. It is enlargement of the prostate gland. It happens as men age. BPH often causes problems with urination. It sometimes causes blood in the urine.  · A urinary tract infection (UTI) is due to bacteria growing in the urinary tract. It can cause blood in the urine. Other symptoms include burning or pain with urination. You may need to urinate often or urgently. You may also have a fever.  · Damage to the urinary tract may cause blood in the urine. This damage may be due to a blow or accident. It may also result from the use of a urinary catheter. Very hard exercise may sometimes irritate the urinary tract and cause bleeding.  · Cancer may occur anywhere in the urinary tract. A tumor may sometimes cause no symptoms other than bleeding.     Other possible causes of bleeding include:  · Prostatitis (infection of the prostate gland)  · Taking anticoagulants  · Blockage in the urinary tract  · Disease or inflammation of the kidney  · Cystic diseases of the kidneys  · Sickle cell anemia  · Vigorous exercise  · Endometriosis  Date Last Reviewed: 12/1/2016 © 2000-2017 [employer]. [address] [place] [id number]. All rights reserved. This information is not intended as a substitute for professional medical care.  Always follow your healthcare professional's instructions.        Blood in the Urine    Blood in the urine (hematuria) has many possible causes. If it occurs after an injury (such as a car accident or fall), it is most often a sign of bruising to the kidney or bladder. Common causes of blood in the urine include urinary tract infections, kidney stones, inflammation, tumors, or certain other diseases of the kidney or bladder. Menstruation can cause blood to appear in the urine sample, although it is not coming from the urinary tract.  If only a trace amount of blood is present, it will show up on the urine test, even though the urine may be yellow and not pink or red. This may occur with any of the above conditions, as well as heavy exercise or high fever. In this case, your doctor may want to repeat the urine test on another day. This will show if the blood is still present. If it is, then other tests can be done to find out the cause.  Home care  Follow these home care guidelines:  · If your urine does not appear bloody (pink, brown or red) then you do not need to restrict your activity in any way.  · If you can see blood in your urine, rest and avoid heavy exertion until your next exam. Do not use aspirin, blood thinners, or anti-platelet or anti-inflammatory medicines. These include ibuprofen and naproxen. These thin the blood and may increase bleeding.  Follow-up care  Follow up with your healthcare provider, or as advised. If you were injured and had blood in your urine, you should have a repeat urine test in 1 to 2 days. Contact your doctor for this test.  A radiologist will review any X-rays that were taken. You will be told of any new findings that may affect your care.  When to seek medical advice  Call your healthcare provider right away if any of these occur:  · Bright red blood or blood clots in the urine (if you did not have this before)  · Weakness, dizziness or fainting  · New groin, abdominal, or  back pain  · Fever of 100.4ºF (38ºC) or higher, or as directed by your healthcare provider  · Repeated vomiting  · Bleeding from the nose or gums or easy bruising  Date Last Reviewed: 9/1/2016 © 2000-2017 [employer]. [address] [place] [address]. All rights reserved. This information is not intended as a substitute for professional medical care. Always follow your healthcare professional's instructions.        Testicular Pain, Unclear Cause  You have had pain in one or both testicles. Based on your exam today, the exact cause of your pain is not certain. But your condition does not appear to be dangerous. Testicles are very sensitive. Even a small injury can cause quite a bit of pain. Other possible causes of testicular pain include kidney stones, cysts, mumps, inflammatory conditions, chronic conditions, hernia, infection, and a twisted testicle.  Certain tests may be done to rule out an underlying problem causing the pain. Nothing conclusive was found today. Most likely, the pain will go away on its own. If it doesnt, you may need more tests.    Home care  Medicine may be prescribed to help relieve pain and swelling. This may be an over-the-counter pain reliever or prescription pain medication. Take all medicine as directed.  The following are general care guidelines:  · To relieve pain and swelling, apply an ice pack wrapped in a thin towel for 10 minutes at a time. Continue this on and off for 1 to 2 days.  · When lying down, place a small rolled towel under your scrotum. When moving around, wear a jockstrap (athletic supporter) or supportive underwear. These will help support and protect your testicles.  · If it hurts to walk, walk as little as possible until you feel better.  · Avoid strenuous activity until you feel better.  · Do not have sex until you feel better.  · If you have severe pain in the testicle, seek care right away. Delay may lead to permanent loss of the testicles  function.  Follow-up care  Follow up with your healthcare provider, or as advised.  When to seek medical advice  Call your healthcare provider right away if any of these occur:  · Fever of 100.4°F (38°C) or higher  · Worsening of the pain or severe pain  · Swelling of the testicle or scrotum  · A lump in the scrotum  · Warm and red scrotum (signs of infection)  · Nausea and vomiting  · Pain or swelling in abdomen  · Trouble urinating  · Numbness or weakness in the leg  · Shrinking of the testicle  · Blood in your urine  Date Last Reviewed: 10/1/2016  © 6209-3654 [employer]. [address] [place] [address], [place] [address]. All rights reserved. This information is not intended as a substitute for professional medical care. Always follow your healthcare professional's instructions.

## 2019-03-12 NOTE — PROGRESS NOTES
"Subjective:       Patient ID: Lola Stevens Jr. is a 48 y.o. male.    Chief Complaint: Scrotal/testicular pain    HPI     Lola Stevens Jr. is a 48 y.o. male new patient to me (records personally reviewed by me), w/ h/o HTN. Pt is s/p ED 3/3/19 for testicular pain, and presyncope, w/u unremarkable. Pt currently taking tamsulosin.    Today pt presents to clinic w/ spouse reporting "bladder pain and pressure" x 1-2 wks. He reports decreased urinary stream only in the morning after waking, stream improves throughout day, FOS is adequate. After starting, tamsulosin, he reports stream has improved. He denies nocturia, urinary frequency, urgency, leakage, incontinence. He reports consuming AM coffee, 12pk+ beer/weekend. He is unable to produce urine sample in clinic today. He reports his scrotal/testicular "pressure" has improved, and mostly resolved. He denies dysuria, GH, n/v, f/c, family h/o bladder/prostate CA. He quit smoking in 2005.    Review of Systems   Constitutional: Negative for chills and fever.   Eyes: Negative for visual disturbance.   Respiratory: Negative for shortness of breath.    Cardiovascular: Negative for chest pain, palpitations and leg swelling.   Gastrointestinal: Negative for abdominal pain, constipation, nausea and vomiting.   Endocrine: Negative for polyuria.   Genitourinary: Negative for difficulty urinating, dysuria, frequency, hematuria and urgency.   Musculoskeletal: Negative for back pain.   Skin: Negative for rash.   Neurological: Negative for dizziness and headaches.           Objective:       Cr 1.1 (3/4/19)    UA w/ trace ketones otherwise unremarkable (3/4/19)    Scrotal and testicular US (3/3/19):  FINDINGS:  Right Testicle:    *Size: 4.0 x 2.9 x 2.3 cm  *Appearance: Normal.  *Flow: Normal arterial and venous flow  *Epididymis: 0.2 cm epididymal head cyst.  Otherwise normal.  *Hydrocele: None.  *Varicocele: None.  .  Left Testicle:    *Size: 4.0 x 2.7 x 2.2 " cm  *Appearance: Normal.  *Flow: Normal arterial and venous flow  *Epididymis: 0.5 cm epididymal head cyst.  Otherwise normal.  *Hydrocele: None.  *Varicocele: None.  .  Other findings: None.      Impression       No acute sonographic abnormality of the testicles or scrotum.    Small bilateral epididymal head cysts.     UA micro 3 RBCs (11/27/18).    Physical Exam   Vitals reviewed.  Constitutional: He is oriented to person, place, and time. He appears well-developed and well-nourished. No distress.   HENT:   Head: Normocephalic.   Eyes: Conjunctivae are normal. No scleral icterus.   Neck: Normal range of motion.   Pulmonary/Chest: Effort normal. No respiratory distress.   Abdominal: Soft. He exhibits no distension. There is no tenderness. There is no rebound.   Genitourinary: Penis normal.   Musculoskeletal: He exhibits no edema.   Neurological: He is alert and oriented to person, place, and time.   Skin: Skin is warm.     Psychiatric: He has a normal mood and affect. His behavior is normal.         Assessment:       1. Scrotal pain    2. Microscopic hematuria    3. Epididymal cyst    4. Other microscopic hematuria    5. Bladder pain    6. Benign prostatic hyperplasia, unspecified whether lower urinary tract symptoms present        Plan:       I spent 30 minutes with the patient. Over 50% of the visit was spent in counseling.    Discussed and reviewed scrotal/testicular pain, potential etiologies including but not limited to inflammation, infection, unclear etiology. Discussed and reviewed scrotal US findings. Discussed supportive tx w/ supportive underwear, ice prn, ibuprofen prn w/ food.    Discussed h/o microhematuria w/ urine micro. Recommend hematologic w/u w/ cysto, and CTU. Discussed an reviewed procedure, harms, and potential risks.    Discussed and reviewed home collect urine sample, will be sending urine for cx to r/o bacterial etiology.    Patient verbalized and expressed understanding, and agree w/  plan.

## 2019-03-14 ENCOUNTER — TELEPHONE (OUTPATIENT)
Dept: UROLOGY | Facility: CLINIC | Age: 49
End: 2019-03-14

## 2019-03-14 ENCOUNTER — OFFICE VISIT (OUTPATIENT)
Dept: UROLOGY | Facility: CLINIC | Age: 49
End: 2019-03-14
Payer: COMMERCIAL

## 2019-03-14 ENCOUNTER — HOSPITAL ENCOUNTER (OUTPATIENT)
Dept: RADIOLOGY | Facility: HOSPITAL | Age: 49
Discharge: HOME | End: 2019-03-14
Attending: NURSE PRACTITIONER
Payer: COMMERCIAL

## 2019-03-14 VITALS
BODY MASS INDEX: 27.35 KG/M2 | HEART RATE: 94 BPM | WEIGHT: 191 LBS | SYSTOLIC BLOOD PRESSURE: 146 MMHG | HEIGHT: 70 IN | DIASTOLIC BLOOD PRESSURE: 94 MMHG

## 2019-03-14 DIAGNOSIS — R31.29 MICROSCOPIC HEMATURIA: ICD-10-CM

## 2019-03-14 DIAGNOSIS — R03.0 ELEVATED BLOOD PRESSURE READING: ICD-10-CM

## 2019-03-14 DIAGNOSIS — R39.89 BLADDER PAIN: Primary | ICD-10-CM

## 2019-03-14 DIAGNOSIS — N40.0 BENIGN PROSTATIC HYPERPLASIA, UNSPECIFIED WHETHER LOWER URINARY TRACT SYMPTOMS PRESENT: ICD-10-CM

## 2019-03-14 DIAGNOSIS — R39.89 BLADDER PAIN: ICD-10-CM

## 2019-03-14 PROCEDURE — 74018 RADEX ABDOMEN 1 VIEW: CPT | Mod: 26,,, | Performed by: RADIOLOGY

## 2019-03-14 PROCEDURE — 74018 RADEX ABDOMEN 1 VIEW: CPT | Mod: TC

## 2019-03-14 PROCEDURE — 99212 OFFICE O/P EST SF 10 MIN: CPT | Mod: S$GLB,,, | Performed by: NURSE PRACTITIONER

## 2019-03-14 PROCEDURE — 99999 PR PBB SHADOW E&M-EST. PATIENT-LVL III: ICD-10-PCS | Mod: PBBFAC,,, | Performed by: NURSE PRACTITIONER

## 2019-03-14 PROCEDURE — 99999 PR PBB SHADOW E&M-EST. PATIENT-LVL III: CPT | Mod: PBBFAC,,, | Performed by: NURSE PRACTITIONER

## 2019-03-14 PROCEDURE — 74018 XR ABDOMEN AP 1 VIEW: ICD-10-PCS | Mod: 26,,, | Performed by: RADIOLOGY

## 2019-03-14 PROCEDURE — 99212 PR OFFICE/OUTPT VISIT, EST, LEVL II, 10-19 MIN: ICD-10-PCS | Mod: S$GLB,,, | Performed by: NURSE PRACTITIONER

## 2019-03-14 NOTE — TELEPHONE ENCOUNTER
"Spoke w/ pt.'s spouse. She reports he's feeling "bladder pressure." "He's peeing fine" w/o dysuria or complications. "He feels like he has a UTI" Discussed urine cx remain pending. Asked pt to come in clinic for evaluation. She's amenable to plan.     ----- Message from Aishwarya Ochoa sent at 3/14/2019 10:11 AM CDT -----  Contact: pt wife Maria G [phone]  Needs Advice    Reason for call: pt wife stated pt is feeling a lot of pressure in his bladder.  She wants to know if he could take AZO.        Communication Preference:Maria G [phone]    Additional Information:      "

## 2019-03-15 ENCOUNTER — TELEPHONE (OUTPATIENT)
Dept: UROLOGY | Facility: CLINIC | Age: 49
End: 2019-03-15

## 2019-03-15 NOTE — TELEPHONE ENCOUNTER
"Spoke w/ pt. Reports feeling a bit better today. Reports "had a real good bowel movement last night." Denies f/c, n/v, intractable pain. Reports continue to take metamucil qd, one teaspoon w/ 8oz water. Discussed and reviewed unremarkable KUB (see below), and urine cx results. Discussed and recommend continue w/ bowel prep, avoid bladder irritants such as alcohol, caffeine, spicy foods, nutrisweet. Advised to f/u w/ CTU and cysto as scheduled. Pt verbalized understanding and agree w/ plan.    FINDINGS:  Surgical clips are present in the gallbladder area.  Intestinal pattern shows scattered gas mostly in the colon without abnormal distention.  No stones or obvious masses are detected in the abdomen.  Vascular calcifications are noted in the pelvis.  The skeletal structures are intact.      Impression       No urinary tract stones are detected       "

## 2019-03-20 ENCOUNTER — TELEPHONE (OUTPATIENT)
Dept: GASTROENTEROLOGY | Facility: CLINIC | Age: 49
End: 2019-03-20

## 2019-03-20 NOTE — TELEPHONE ENCOUNTER
----- Message from Sabina Barbosa MA sent at 3/20/2019  2:02 PM CDT -----  Dr Ramírez.  Pt has an appointment with you on 5/8 at 4:30. Has a lot of gas. Gas x not helping. Is there something else he can take?  Sabina

## 2019-03-20 NOTE — TELEPHONE ENCOUNTER
03/20/2019  3:17 PM    Called patient back to get a better sense of his gas. He states that one and off he has had gas under his ribs not responding to Gas X.     However today his main issue was lower abdominal pain, bladder pressure, feeling like he wasn't voiding completely, his urine starting to sting, and intermittent left testicular pain. I instructed patient to contact his PCP as symptoms did not seem to be secondary to his GI tract (?UTI, ?Prostate). I also instructed him that if Gas X was not helpful he could try Phazyme which is also over the counter but that likely NOT help with his current symptoms. Patient will call us back if gas is an ongoing issue/no improvement with phazyme and will see us in clinic in May 2019.    Betsy Ramírez M.D.  Gastroenterology Fellow, PGY-V  Pager: [phone]  [facility]

## 2019-03-20 NOTE — TELEPHONE ENCOUNTER
----- Message from Salima Boston sent at 3/18/2019  2:04 PM CDT -----  Contact: Wife  Wife is calling to schedule a follow up appt; however,  was unable to make the appt due to an exhausted template.  She is requesting a returned call for scheduling.    She can be reached at [phone].    Thank you.

## 2019-03-21 ENCOUNTER — TELEPHONE (OUTPATIENT)
Dept: GASTROENTEROLOGY | Facility: CLINIC | Age: 49
End: 2019-03-21

## 2019-03-21 ENCOUNTER — TELEPHONE (OUTPATIENT)
Dept: UROLOGY | Facility: CLINIC | Age: 49
End: 2019-03-21

## 2019-03-21 NOTE — TELEPHONE ENCOUNTER
Spoke with patient. Appointment rescheduled for Dr Ramírez clinic on 4/22/19  At 2pm. Reminder letter sent in mail.

## 2019-03-21 NOTE — TELEPHONE ENCOUNTER
Hi,  You would have to talk to Benita or navarro to override the schedule   Nothing available MA unable to override the schedule anymore

## 2019-03-21 NOTE — TELEPHONE ENCOUNTER
Dr Durant has no openings. If you feel patient needs to be seen earlier then can book with Dr Durant on fellow day at his discretion.

## 2019-03-21 NOTE — TELEPHONE ENCOUNTER
"03/21/2019  8:00 AM    Yesterday afternoon spoke to patient at length about his symptoms. He did report "excess gas" however also reported bladder pressure, issues voiding (felt like he wasn't voiding completely), and testicular pain. Although I recommend Phazyme, I informed that patient that I was unsure that his GI tract was the cause of all symptoms.    The wife called this morning to report symptoms were getting "worse" and he was complaining of "severe abdominal pain".  Patient was not present to speak as he had gone to work to ask for the day off to possibly go to the ED.     I told her that based on our conversation yesterday I was not sure that his symptoms were all GI in nature but if he was truly in severe pain going to the ED would not be unreasonable.     Recommendations:  --Due to reports of severe abdominal pain would recommend that patient go to the ED for physical exam and labs +/- imaging based on ED assessment     --For his gas/bloating can use FDgard as needed however unsure that this will help with bladder pressure, issues voiding, and testicular pain    --Follow up in GI clinic (will try to move up appointment)    Betsy Ramírez M.D.  Gastroenterology Fellow, PGY-V  Pager: [phone]  [facility]      "

## 2019-03-21 NOTE — TELEPHONE ENCOUNTER
This message was addressed in earlier phone encounter.    ----- Message from Ariela Pink RN sent at 3/21/2019  8:26 AM CDT -----  Contact: Pt's Wife Mrs Stevens      ----- Message -----  From: Yodit Sutton  Sent: 3/21/2019   8:14 AM  To: Donny Will Staff    Pt's Wife Mrs Stevens called to speak to the nurse due to the pt being in great pain and having difficulty walking and would like a call back asap to see if the pt needs to go to the ER.    Mrs Stevens can be reached at [phone].

## 2019-03-21 NOTE — TELEPHONE ENCOUNTER
----- Message from Sabina Barbosa MA sent at 3/21/2019  7:40 AM CDT -----  Contact: Ms Stevens / [phone]      ----- Message -----  From: Maricarmen Stanley  Sent: 3/21/2019   7:25 AM  To: Darrell Meyer Staff    Type:  Needs Medical Advice    Who Called: Ms Stevens  Symptoms (please be specific): Patient experiencing severe stomach pain  How long has patient had these symptoms:    Pharmacy name and phone #:  [facility]  Would the patient rather a call back or a response via [facility]? call  Best Call Back Number: [phone]  Additional Information:  Ms Stevens states need to speak with nurse.  Patient experiencing severe pain want to know if doctor can admit patient in hospital so that patient can have test done

## 2019-03-21 NOTE — TELEPHONE ENCOUNTER
----- Message from Maricarmen Stanley sent at 3/21/2019  7:25 AM CDT -----  Contact: Ms Stevens / [phone]  Type:  Needs Medical Advice    Who Called: Ms Stevens  Symptoms (please be specific): Patient experiencing severe stomach pain  How long has patient had these symptoms:    Pharmacy name and phone #:  [facility]  Would the patient rather a call back or a response via [facility]? call  Best Call Back Number: [phone]  Additional Information:  Ms Stevens states need to speak with nurse.  Patient experiencing severe pain want to know if doctor can admit patient in hospital so that patient can have test done

## 2019-03-21 NOTE — TELEPHONE ENCOUNTER
"Spoke w/ pt and spouse. He reports recurrence of "bladder pressure." He thought it was "gas" took gas-x w/o relief. He denies f/c, n/v, GH. He reports adequate FOS. He feels his bladder is not emptying as it should. He took AZO w/o relief. Advised pt to return to clinic or report to UC or ED if he is in requires immediate care. Pt verbalized understanding.  "

## 2019-03-21 NOTE — TELEPHONE ENCOUNTER
----- Message from Betsy Ramírez MD sent at 3/21/2019  8:17 AM CDT -----  Hi :)    Do I have an earlier appt with Dr. Durant available? This patient has called multiple times so I was trying to move up his appt.    YA

## 2019-03-23 ENCOUNTER — HOSPITAL ENCOUNTER (OUTPATIENT)
Dept: RADIOLOGY | Facility: HOSPITAL | Age: 49
Discharge: HOME | End: 2019-03-23
Attending: NURSE PRACTITIONER
Payer: COMMERCIAL

## 2019-03-23 PROCEDURE — 25500020 PHARM REV CODE 255: Performed by: NURSE PRACTITIONER

## 2019-03-23 PROCEDURE — 74178 CT UROGRAM ABD PELVIS W WO: ICD-10-PCS | Mod: 26,,, | Performed by: RADIOLOGY

## 2019-03-23 PROCEDURE — 74178 CT ABD&PLV WO CNTR FLWD CNTR: CPT | Mod: TC

## 2019-03-23 PROCEDURE — 74178 CT ABD&PLV WO CNTR FLWD CNTR: CPT | Mod: 26,,, | Performed by: RADIOLOGY

## 2019-03-23 RX ADMIN — IOHEXOL 125 ML: 350 INJECTION, SOLUTION INTRAVENOUS at 11:03

## 2019-03-25 ENCOUNTER — TELEPHONE (OUTPATIENT)
Dept: UROLOGY | Facility: CLINIC | Age: 49
End: 2019-03-25

## 2019-03-25 NOTE — TELEPHONE ENCOUNTER
Spoke w/ pt. Discussed and reviewed CTU findings. Discussed unremarkable urologic findings. Advised to f/u w/ PCP regarding two tiny pulmonary micro nodules in R lwr lobe (unchanged compared to 9/24/18). Advised to continue w/ cystoscopy to complete hematuria w/u. Pt verbalized understanding.

## 2019-04-02 ENCOUNTER — TELEPHONE (OUTPATIENT)
Dept: GASTROENTEROLOGY | Facility: CLINIC | Age: 49
End: 2019-04-02

## 2019-04-02 ENCOUNTER — PATIENT MESSAGE (OUTPATIENT)
Dept: GASTROENTEROLOGY | Facility: HOSPITAL | Age: 49
End: 2019-04-02

## 2019-04-02 DIAGNOSIS — R10.9 ABDOMINAL PAIN, UNSPECIFIED ABDOMINAL LOCATION: ICD-10-CM

## 2019-04-02 DIAGNOSIS — R14.0 BLOATING: Primary | ICD-10-CM

## 2019-04-02 RX ORDER — DICYCLOMINE HYDROCHLORIDE 10 MG/1
10 CAPSULE ORAL 3 TIMES DAILY PRN
Qty: 90 CAPSULE | Refills: 0 | Status: SHIPPED | OUTPATIENT
Start: 2019-04-02 | End: 2019-04-03 | Stop reason: SDUPTHER

## 2019-04-02 NOTE — TELEPHONE ENCOUNTER
----- Message from Kendal Cerna sent at 4/2/2019  2:03 PM CDT -----  Contact: wife, [phone]  Needs Advice    Reason for call: Pt wife called in regarding a medication name dicyclomene. She would like to know if the pt can be prescribed that medication again. Another doctor had prescribed that medication for him, but if Dr. Ramírez say it is okay can he send a prescription over to pt pharmacy         Communication Preference: wife, [phone]    Additional Information:

## 2019-04-03 ENCOUNTER — TELEPHONE (OUTPATIENT)
Dept: GASTROENTEROLOGY | Facility: HOSPITAL | Age: 49
End: 2019-04-03

## 2019-04-03 DIAGNOSIS — R14.0 BLOATING: ICD-10-CM

## 2019-04-03 DIAGNOSIS — R10.9 ABDOMINAL PAIN, UNSPECIFIED ABDOMINAL LOCATION: ICD-10-CM

## 2019-04-03 RX ORDER — DICYCLOMINE HYDROCHLORIDE 10 MG/1
10 CAPSULE ORAL 3 TIMES DAILY PRN
Qty: 90 CAPSULE | Refills: 0 | Status: SHIPPED | OUTPATIENT
Start: 2019-04-03 | End: 2019-05-03

## 2019-04-03 NOTE — TELEPHONE ENCOUNTER
Replied to patient via patient portal    Betsy Ramírez M.D.  Gastroenterology Fellow, PGY-V  Pager: [phone]  [facility]

## 2019-04-03 NOTE — TELEPHONE ENCOUNTER
----- Message from Sabina Barbosa MA sent at 4/2/2019  2:16 PM CDT -----  Contact: wife, [phone]      ----- Message -----  From: Kendal Cerna  Sent: 4/2/2019   2:03 PM  To: Darrell Meyer Staff    Needs Advice    Reason for call: Pt wife called in regarding a medication name dicyclomene. She would like to know if the pt can be prescribed that medication again. Another doctor had prescribed that medication for him, but if Dr. Ramírez say it is okay can he send a prescription over to pt pharmacy         Communication Preference: wife, [phone]    Additional Information:

## 2019-04-22 ENCOUNTER — LAB VISIT (OUTPATIENT)
Dept: LAB | Facility: HOSPITAL | Age: 49
End: 2019-04-22
Payer: COMMERCIAL

## 2019-04-22 ENCOUNTER — OFFICE VISIT (OUTPATIENT)
Dept: GASTROENTEROLOGY | Facility: CLINIC | Age: 49
End: 2019-04-22
Payer: COMMERCIAL

## 2019-04-22 VITALS
HEIGHT: 70 IN | HEART RATE: 98 BPM | WEIGHT: 193.56 LBS | BODY MASS INDEX: 27.71 KG/M2 | SYSTOLIC BLOOD PRESSURE: 135 MMHG | DIASTOLIC BLOOD PRESSURE: 90 MMHG

## 2019-04-22 DIAGNOSIS — R10.84 GENERALIZED ABDOMINAL PAIN: ICD-10-CM

## 2019-04-22 DIAGNOSIS — K58.9 IRRITABLE BOWEL SYNDROME, UNSPECIFIED TYPE: ICD-10-CM

## 2019-04-22 DIAGNOSIS — R14.0 BLOATING: Primary | ICD-10-CM

## 2019-04-22 DIAGNOSIS — R14.0 BLOATING: ICD-10-CM

## 2019-04-22 PROCEDURE — 99999 PR PBB SHADOW E&M-EST. PATIENT-LVL III: CPT | Mod: PBBFAC,,, | Performed by: STUDENT IN AN ORGANIZED HEALTH CARE EDUCATION/TRAINING PROGRAM

## 2019-04-22 PROCEDURE — 36415 COLL VENOUS BLD VENIPUNCTURE: CPT

## 2019-04-22 PROCEDURE — 99213 PR OFFICE/OUTPT VISIT, EST, LEVL III, 20-29 MIN: ICD-10-PCS | Mod: S$GLB,,, | Performed by: STUDENT IN AN ORGANIZED HEALTH CARE EDUCATION/TRAINING PROGRAM

## 2019-04-22 PROCEDURE — 83516 IMMUNOASSAY NONANTIBODY: CPT | Mod: 59

## 2019-04-22 PROCEDURE — 99999 PR PBB SHADOW E&M-EST. PATIENT-LVL III: ICD-10-PCS | Mod: PBBFAC,,, | Performed by: STUDENT IN AN ORGANIZED HEALTH CARE EDUCATION/TRAINING PROGRAM

## 2019-04-22 PROCEDURE — 99213 OFFICE O/P EST LOW 20 MIN: CPT | Mod: S$GLB,,, | Performed by: STUDENT IN AN ORGANIZED HEALTH CARE EDUCATION/TRAINING PROGRAM

## 2019-04-22 NOTE — PATIENT INSTRUCTIONS
--Continue Bentyl    --Start Iberogast (you need to purchase it on [employer] and follow instructions on the back)    --Obtain blood test to check for Celiac     --Obtain breath test to check for small intestinal bacteria overgrowth, please call your insurance to make sure its covered, if not covered called our office to let us know    --Follow up in 3 months    Betsy Ramírez M.D.  Gastroenterology Fellow, PGY-V  [facility]

## 2019-04-22 NOTE — PROGRESS NOTES
"     Gastroenterology Clinic    Reason for visit: The primary encounter diagnosis was Bloating. Diagnoses of Generalized abdominal pain and Irritable bowel syndrome, unspecified type were also pertinent to this visit.  Referring provider/PCP: Estiven Worthington MD    HPI:  48 year old male ex-smoker with a history of GERD who presents to clinic for follow up of ongoing abdominal pain/bloating.    Patient was last seen by me on 08/2018, after presenting with his wife to clinic for a 2nd opinion for his abdominal pain. HPI from that visit below:  "He presents with his wife and has a notebook with 2 pages of complaints (GI complaints covered first and additional complaints in ROS). Patient has had intermittent episodes of abdominal pain since 2013. He had a cholecystectomy however symptoms did not improve. He states that without warning he can have pain under his left rib, right rib, and mid-abdomen which he describes as stabbing/burning sensation associated with this "stomach blowing up". He also feels "gassy" but states he is not able to burp or pass flatus. Also complains of hunger pains which drive him to eat to see if pain resolves however upon eating gets full right away. Furthermore he states that when he has red sauce or pork he feels a knot in the epigastric region which is relieved with tums and H2 blocker. He is on a daily PPI which he takes with breakfast but he believes it is not helping as much. Lastly he states his "bowel movements are all over the place". One day he can have skinny light color stools and the next day he can have diarrhea. However here denies any melena or hematochezia.  No weight loss, no dysphagia, no odynophagia, no hematemesis. No personal or family history of GI malignancy. He works as a machinest which entails heaving pulling."    Interval History:  Patient presents with his wife to the appointment. Since last being seen in clinic his symptoms have remained the same. He continues to " complain of abdominal pain which upon further questioning is more of bloating/gas however per patient is it very distressed to the point where he went to the ED thinking he could be having a heart attack. He reports that although every morning he passes significant amount of gas, when his abdominal pain/bloating becomes significant he cannot pass gas. He does not drink carbonated drinks, doesn't eat dairy, or use straws. He has also try to modify this diet and eat healthier to se if this improves symptoms.    He has lost some weight (207 during August clinic appointment and now 193 during this clinic appointment) however he is unsure if it is due to eating healthier.  He denies any dysphagia, odynophagia, early satiety, GERD/reflux, nausea, emesis, melena, or hematochezia. He is having 1-2 brown BM per day.  He denies any improvement with protonix, FDgard, or IBgard. He does report improvement in symptoms with Phazyme and Bentyl.    At the end of the visit patient expressed concern about having pancreatic cancer as he knew someone who was diagnostic late and passed away. He also voice this concern during the last clinic visit.    Review of Systems:  Constitutional: no fever, chills  Eyes: no visual changes   ENT: no sore throat or dysphagia  Respiratory: no cough or shortness of breath   Cardiovascular: no chest pain or palpitations   Gastrointestinal: as per HPI  Hematologic/Lymphatic: no easy bruising or lymphadenopathy   Musculoskeletal: no arthralgias or myalgias   Neurological: no change in mental status  Behavioral/Psych: no change in mood      Past Medical History:   Diagnosis Date    Diverticulosis     GERD (gastroesophageal reflux disease)     Hemorrhoids        Past Surgical History:   Procedure Laterality Date    CHOLECYSTECTOMY      COLONOSCOPY      COLONOSCOPY N/A 10/17/2018    [facility] ([facility]    EGD (ESOPHAGOGASTRODUODENOSCOPY) N/A 10/17/2018    Performed  by [facility] at [facility] (4TH FLR)    EGD (ESOPHAGOGASTRODUODENOSCOPY) N/A 2013    [facility] Court [facility] at [facility]    ESOPHAGOGASTRODUODENOSCOPY     Lap sukhjinder 2014  Path-mild chronic cholecystitis with benign LN    Family History   Problem Relation Age of Onset    Heart disease Father     Colon cancer Neg Hx     Esophageal cancer Neg Hx     Stomach cancer Neg Hx     Ulcerative colitis Neg Hx     Crohn's disease Neg Hx     Celiac disease Neg Hx     Irritable bowel syndrome Neg Hx        Social History     Socioeconomic History    Marital status:      Spouse name: Not on file    Number of children: Not on file    Years of education: Not on file    Highest education level: Not on file   Occupational History    Not on file   Social Needs    Financial resource strain: Not on file    Food insecurity:     Worry: Not on file     Inability: Not on file    Transportation needs:     Medical: Not on file     Non-medical: Not on file   Tobacco Use    Smoking status: Former Smoker     Packs/day: 1.00     Years: 15.00     Pack years: 15.00     Types: Cigarettes     Last attempt to quit: 10/17/2002     Years since quittin.5    Smokeless tobacco: Former User     Quit date: 2018    Tobacco comment: quit smoking 15 years ago   Substance and Sexual Activity    Alcohol use: Yes     Comment: on weekends    Drug use: No    Sexual activity: Not on file   Lifestyle    Physical activity:     Days per week: Not on file     Minutes per session: Not on file    Stress: Not on file   Relationships    Social connections:     Talks on phone: Not on file     Gets together: Not on file     Attends [employer] service: Not on file     Active member of club or organization: Not on file     Attends meetings of clubs or organizations: Not on file     Relationship status: Not on file   Other Topics Concern    Not on file   Social History Narrative    Not on file  "      Current Outpatient Medications on File Prior to Visit   Medication Sig Dispense Refill    dicyclomine (BENTYL) 10 MG capsule Take 1 capsule (10 mg total) by mouth 3 (three) times daily as needed (Abdominal pain and bloating). 90 capsule 0    pantoprazole (PROTONIX) 40 MG tablet Take 1 tablet (40 mg total) by mouth once daily. 30 tablet 1    tamsulosin (FLOMAX) 0.4 mg Cap Take 1 capsule (0.4 mg total) by mouth once daily. 30 capsule 11     No current facility-administered medications on file prior to visit.        Review of patient's allergies indicates:  No Known Allergies    Physical Exam:  BP (!) 135/90   Pulse 98   Ht 5' 10" (1.778 m)   Wt 87.8 kg (193 lb 9 oz)   BMI 27.77 kg/m²   General appearance: alert, appears stated age and cooperative  Neck: no adenopathy, no carotid bruit, no JVD, supple, symmetrical, trachea midline and thyroid not enlarged, symmetric, no tenderness/mass/nodules  Back: symmetric, no curvature. ROM normal. No CVA tenderness.  Lungs: clear to auscultation bilaterally  Chest wall: no tenderness  Heart: regular rate and rhythm, S1, S2 normal, no murmur, click, rub or gallop  Abdomen: BS present, abdomen soft and not distended, mild tenderness on deep palpation in the LLQ/epigastric region, no guarding or rebound  Extremities: extremities normal, atraumatic, no cyanosis or edema  Pulses: 2+ and symmetric    Laboratory:  Lab Results   Component Value Date    WBC 5.61 03/04/2019    HGB 15.5 03/04/2019    HCT 45.1 03/04/2019    MCV 88 03/04/2019     03/04/2019     CMP  Sodium   Date Value Ref Range Status   03/04/2019 140 136 - 145 mmol/L Final     Potassium   Date Value Ref Range Status   03/04/2019 4.9 3.5 - 5.1 mmol/L Final     Chloride   Date Value Ref Range Status   03/04/2019 108 95 - 110 mmol/L Final     CO2   Date Value Ref Range Status   03/04/2019 23 23 - 29 mmol/L Final     Glucose   Date Value Ref Range Status   03/04/2019 95 70 - 110 mg/dL Final     BUN, Bld "   Date Value Ref Range Status   03/04/2019 20 6 - 20 mg/dL Final     Creatinine   Date Value Ref Range Status   03/04/2019 1.1 0.5 - 1.4 mg/dL Final     Calcium   Date Value Ref Range Status   03/04/2019 9.6 8.7 - 10.5 mg/dL Final     Total Protein   Date Value Ref Range Status   03/04/2019 6.5 6.0 - 8.4 g/dL Final     Albumin   Date Value Ref Range Status   03/04/2019 3.7 3.5 - 5.2 g/dL Final     Total Bilirubin   Date Value Ref Range Status   03/04/2019 0.7 0.1 - 1.0 mg/dL Final     Comment:     For infants and newborns, interpretation of results should be based  on gestational age, weight and in agreement with clinical  observations.  Premature Infant recommended reference ranges:  Up to 24 hours.............<8.0 mg/dL  Up to 48 hours............<12.0 mg/dL  3-5 days..................<15.0 mg/dL  6-29 days.................<15.0 mg/dL       Alkaline Phosphatase   Date Value Ref Range Status   03/04/2019 56 55 - 135 U/L Final     AST   Date Value Ref Range Status   03/04/2019 17 10 - 40 U/L Final     ALT   Date Value Ref Range Status   03/04/2019 21 10 - 44 U/L Final     Anion Gap   Date Value Ref Range Status   03/04/2019 9 8 - 16 mmol/L Final     eGFR if    Date Value Ref Range Status   03/04/2019 >60.0 >60 mL/min/1.73 m^2 Final     eGFR if non    Date Value Ref Range Status   03/04/2019 >60.0 >60 mL/min/1.73 m^2 Final     Comment:     Calculation used to obtain the estimated glomerular filtration  rate (eGFR) is the CKD-EPI equation.        Additional labs:  --TSH (3/3/19): 1.4 (normal)  --Lipase (11/27/19): 21 normal    Endoscopy:  EGD 7/31/15  --Normal esophagus  --Erythema/granularity in the antrum compatible with erosive gastropathy  --Normal duodenum  Pathology:  --Stomach (antrum)-mild chronic gastritis with focal reactive change, no HP  --Duodenal bulb-small intestinal mucosa with mild non-specific chronic inflammation  --Second part of duodenum-small intestinal mucosa with  no pathology    Colonoscopy 11/20/15  --Cecum reached, good prep  --Internal hemorrhoids  Pathology:  --Ascending colon-colonic mucosa with no pathology  --Sigmoid colon-colonic mucosa with mild non-specific chronic inflammation     EGD 10/2018  --Normal esophagus.  --Erythematous mucosa in the antrum. Biopsied.  --Duodenitis. Biopsied.  --Normal second portion of the duodenum.  Pathology:  --FRAGMENTS OF NONNEOPLASTIC GASTRIC MUCOSA WITH NO ACTIVE INFLAMMATION, EVIDENCE OF H PYLORI OR DYSPLASIA IDENTIFIED.  --FRAGMENTS OF NONNEOPLASTIC DUODENAL MUCOSA SHOWING REACTIVE/REGENERATIVE CHANGES CONSISTENT WITH PEPTIC DUODENITIS, NO DYSPLASIA IDENTIFIED.    Colonoscopy 10/2018  --The entire examined colon is normal.  --The examined portion of the ileum was normal.  --No specimens collected.    Imaging:  MRCP 6/3/14  --Normal intrahepatic/extrahepatic biliary radicals with normal appearing common bile duct. No evidence of biliary obstruction or luminal mass     CT abdomen/pelvis with contrast 11/2015  --Several prominent mesenteric lymph nodes in the right lower quadrant of the abdomen, although non-specific this can be associated with mesenteric adenitis  --Previous sukhjinder  --Small pulmonary nodule    XR upper GI with small bowel series 9/8/17  --Moderate GERD, otherwise normal esophagus, stomach, and small bowel     CT A/P 08/2018  --Prominent section of distal small bowel likely representing nondistention.  Less likely this may represent mild bowel wall enhancement/thickening, such as seen in infection or inflammation.   --Two pulmonary micronodule in the right lower lobe measuring up to 0.4 cm.  For multiple solid nodules all <6 mm, Fleischner Society 2017 guidelines recommend no routine follow up for a low risk patient, or follow up with non-contrast chest CT at 12 months after discovery in a high risk patient.  --Hepatic steatosis.  --Status post cholecystectomy.    Assessment:  48 year old male ex-smoker with a  history of GERD who presents to clinic for follow up of ongoing abdominal pain/bloating.    Plan:  Ongoing abdominal pain likely secondary to irritable bowel syndrome (IBS)  When we first met patient last summer he had multiple complaints dating back to 2013 and was seeking a second opinion. Based on symptoms and prior imaging studies commenting on mesenteric adenitis/enlarged LN we proceed with CT abdomen/pelvis, EGD, and colon which were all unremarkable (summarized above). Unfortunately he continues to have ongoing abdominal pain which he better describes as bloating. Now due to the chronicity of symptoms, with negative workup to date we wonder if this is simple IBS and we should therefore focus on symptom control. Although we have recommended continuing Bentyl and adding Iberogast; for completeness will check for Celiac and SIBO. If Celiac and SIBO testing are negative we could consider GES to rule out gastroparesis however unsure that test will be helpful based on his symptoms.  *Of note due to concern for ongoing pancreatic cancer his last CT was once again reviewed with patient and wife.  --Continue Bentyl  --Start Iberogast  --Obtain Celiac panel   --Obtain breath test for SIBO ([facility] to [facility], wife will call his insurance and if not covered than will order lactulose breath test at [facility])  --Obtain records from Dr. Alex Marinelli (during last visit we reviewed outside records under [facility], imaging/scopes summarized and included in prior/this note, however when reviewing SIBO test patient though he could have had it with Dr. Marinelli and although he wants to proceed with testing at [facility] we will also try to obtain any additional testing done at [facility])  --Follow up in 3 months     Colon cancer screening  --Repeat colonoscopy 2028 as per recommendations on colonoscopy 08/2018    Pulmonary nodule  --Repeat CT August 2018 (order placed)    Orders Placed This Encounter   Procedures     Celiac Disease Panel     Betsy Ramírez M.D.  Gastroenterology Fellow, PGY-V  Pager: [phone]  [facility]

## 2019-04-24 LAB
GLIADIN PEPTIDE IGA SER-ACNC: 8 UNITS
GLIADIN PEPTIDE IGG SER-ACNC: 3 UNITS
IGA SERPL-MCNC: 205 MG/DL (ref 70–400)
TTG IGA SER-ACNC: 5 UNITS
TTG IGG SER-ACNC: 8 UNITS

## 2019-04-25 ENCOUNTER — TELEPHONE (OUTPATIENT)
Dept: GASTROENTEROLOGY | Facility: CLINIC | Age: 49
End: 2019-04-25

## 2019-04-25 DIAGNOSIS — R14.0 BLOATING: Primary | ICD-10-CM

## 2019-04-25 NOTE — TELEPHONE ENCOUNTER
----- Message from Sabina Barbosa MA sent at 4/25/2019  4:55 PM CDT -----  Contact: pt's wife Mrs. Stevens [phone]      ----- Message -----  From: Aishwarya Ochoa  Sent: 4/25/2019   4:45 PM  To: Darrell Meyer Staff    Needs Advice    Reason for call: pt wife called stated that her insurance will not cover the Sibo test. Can something else be done. Please Advise        Communication Preference: [phone]    Additional Information:

## 2019-04-25 NOTE — TELEPHONE ENCOUNTER
[facility] SIBO testing not covered by insurance, will go ahead and order Lactulose test.

## 2019-04-29 ENCOUNTER — HOSPITAL ENCOUNTER (OUTPATIENT)
Dept: UROLOGY | Facility: HOSPITAL | Age: 49
Discharge: HOME | End: 2019-04-29
Attending: UROLOGY
Payer: COMMERCIAL

## 2019-04-29 VITALS
HEART RATE: 107 BPM | RESPIRATION RATE: 16 BRPM | TEMPERATURE: 98 F | BODY MASS INDEX: 27.92 KG/M2 | HEIGHT: 70 IN | WEIGHT: 195 LBS | DIASTOLIC BLOOD PRESSURE: 82 MMHG | SYSTOLIC BLOOD PRESSURE: 136 MMHG

## 2019-04-29 DIAGNOSIS — R31.29 MICROSCOPIC HEMATURIA: ICD-10-CM

## 2019-04-29 PROCEDURE — 52000 PR CYSTOURETHROSCOPY: ICD-10-PCS | Mod: ,,, | Performed by: UROLOGY

## 2019-04-29 PROCEDURE — 52000 CYSTOURETHROSCOPY: CPT

## 2019-04-29 PROCEDURE — 52000 CYSTOURETHROSCOPY: CPT | Mod: ,,, | Performed by: UROLOGY

## 2019-04-29 RX ORDER — CIPROFLOXACIN 500 MG/1
500 TABLET ORAL ONCE
Status: COMPLETED | OUTPATIENT
Start: 2019-04-29 | End: 2019-04-29

## 2019-04-29 RX ORDER — LIDOCAINE HYDROCHLORIDE 20 MG/ML
JELLY TOPICAL ONCE
Status: COMPLETED | OUTPATIENT
Start: 2019-04-29 | End: 2019-04-29

## 2019-04-29 RX ADMIN — CIPROFLOXACIN 500 MG: 500 TABLET ORAL at 01:04

## 2019-04-29 RX ADMIN — LIDOCAINE HYDROCHLORIDE: 20 JELLY TOPICAL at 01:04

## 2019-04-29 NOTE — OP NOTE
DATE OF PROCEDURE:  04/29/2019.    PREOPERATIVE DIAGNOSIS:  Microscopic hematuria.    POSTOPERATIVE DIAGNOSIS:  Microscopic hematuria.    OPERATION PERFORMED:  Flexible cystoscopy.    PROCEDURE IN DETAIL:  The patient was prepped and draped in the supine position.    Xylocaine jelly was instilled per urethra.  The anterior urethra was normal.    Prostatic urethra was 3.5 cm and slightly enlarged.  Bladder neck was normal.    Both ureteral orifices were normal in size, shape, and position.  There were   grade II trabeculations.  There were no stones, tumors, foreign bodies, or   active infections noted.    IMPRESSION:  Microscopic hematuria, no obvious etiology, presumably prostatic in   origin.    RECOMMENDATIONS:  Return to clinic in six months with urinalysis with AMMON Winston/GIOVANNI  dd: 04/29/2019 13:22:06 (CDT)  td: 04/29/2019 14:00:04 (CDT)  Doc ID   #[id number]  Job ID #[id number]    CC:

## 2019-04-29 NOTE — H&P
"[facility]  Staff: Pancho Ayers MD    CC: microscopic hematuria    HPI:  Lola Stevens Jr. is a 48 y.o. male with h/o HTN. S/p ED visit 3/3/19 for testicular pain, and presyncope, w/u unremarkable.     Had been seen in clinic 3/12/19 for "bladder pain and pressure" x 1-2 wks. At that time he reported decreased urinary stream only in the morning, improved with Flomax. He denied nocturia, urinary frequency, urgency, leakage, incontinence. He reported consuming AM coffee, 12pk+ beer/weekend.     At clinic visit on 3/12/19, reported RLQ pain and increased bladder pressure. Was noted to have had microscopic UA on 11/27/18 showing 3 RBCs. CT urogram was performed which was negative. Urine cultures since have been negative.          ROS:  Neg except per HPI    Past Medical History:   Diagnosis Date    Diverticulosis     GERD (gastroesophageal reflux disease)     Hemorrhoids        Past Surgical History:   Procedure Laterality Date    CHOLECYSTECTOMY      COLONOSCOPY      COLONOSCOPY N/A 10/17/2018    Performed by [facility] at [facility] END[facility] (4TH FLR)    EGD (ESOPHAGOGASTRODUODENOSCOPY) N/A 10/17/2018    Performed by [facility] at [facility] END[facility] (4TH FLR)    EGD (ESOPHAGOGASTRODUODENOSCOPY) N/A 11/20/2013    Performed by Court [facility] at [facility]    ESOPHAGOGASTRODUODENOSCOPY         Social History     Socioeconomic History    Marital status:      Spouse name: Not on file    Number of children: Not on file    Years of education: Not on file    Highest education level: Not on file   Occupational History    Not on file   Social Needs    Financial resource strain: Not on file    Food insecurity:     Worry: Not on file     Inability: Not on file    Transportation needs:     Medical: Not on file     Non-medical: Not on file   Tobacco Use    Smoking status: Former Smoker     Packs/day: 1.00     Years: 15.00     Pack years: 15.00     Types: Cigarettes     Last attempt " "to quit: 10/17/2002     Years since quittin.5    Smokeless tobacco: Former User     Quit date: 2018    Tobacco comment: quit smoking 15 years ago   Substance and Sexual Activity    Alcohol use: Yes     Comment: on weekends    Drug use: No    Sexual activity: Not on file   Lifestyle    Physical activity:     Days per week: Not on file     Minutes per session: Not on file    Stress: Not on file   Relationships    Social connections:     Talks on phone: Not on file     Gets together: Not on file     Attends Congregational service: Not on file     Active member of club or organization: Not on file     Attends meetings of clubs or organizations: Not on file     Relationship status: Not on file   Other Topics Concern    Not on file   Social History Narrative    Not on file       Family History   Problem Relation Age of Onset    Heart disease Father     Colon cancer Neg Hx     Esophageal cancer Neg Hx     Stomach cancer Neg Hx     Ulcerative colitis Neg Hx     Crohn's disease Neg Hx     Celiac disease Neg Hx     Irritable bowel syndrome Neg Hx        Review of patient's allergies indicates:  No Known Allergies    Current Outpatient Medications on File Prior to Encounter   Medication Sig Dispense Refill    dicyclomine (BENTYL) 10 MG capsule Take 1 capsule (10 mg total) by mouth 3 (three) times daily as needed (Abdominal pain and bloating). 90 capsule 0    pantoprazole (PROTONIX) 40 MG tablet Take 1 tablet (40 mg total) by mouth once daily. 30 tablet 1    tamsulosin (FLOMAX) 0.4 mg Cap Take 1 capsule (0.4 mg total) by mouth once daily. 30 capsule 11     No current facility-administered medications on file prior to encounter.      Physical Exam:  Estimated body mass index is 27.77 kg/m² as calculated from the following:    Height as of 19: 5' 10" (1.778 m).    Weight as of 19: 87.8 kg (193 lb 9 oz).    Vitals reviewed.  Constitutional: He is oriented to person, place, and time. He appears " well-developed and well-nourished. No distress.   HENT:   Head: Normocephalic.   Eyes: Conjunctivae are normal. No scleral icterus.   Neck: Normal range of motion.   Pulmonary/Chest: Effort normal. No respiratory distress.   Abdominal: Soft. He exhibits no distension. There is no tenderness. There is no rebound.   Genitourinary: Penis normal.   Musculoskeletal: He exhibits no edema.   Neurological: He is alert and oriented to person, place, and time.   Skin: Skin is warm.   Psychiatric: He has a normal mood and affect. His behavior is normal.          Labs:    3/13/19 Urine culture - No growth  1/10/19 Urine culture - No growth  11/27/18 Urine Culture - Enterococcus faecalis resistant to tetracycline    Urine dipstick today - negative for blood, nitrites, and LE.       Lab Results   Component Value Date    WBC 5.61 03/04/2019    HGB 15.5 03/04/2019    HCT 45.1 03/04/2019    MCV 88 03/04/2019     03/04/2019       BMP  Lab Results   Component Value Date     03/04/2019    K 4.9 03/04/2019     03/04/2019    CO2 23 03/04/2019    BUN 20 03/04/2019    CREATININE 1.1 03/04/2019    CALCIUM 9.6 03/04/2019    ANIONGAP 9 03/04/2019    ESTGFRAFRICA >60.0 03/04/2019    EGFRNONAA >60.0 03/04/2019       No results found for: PSA    Imaging:     CT Urogram 3/23/19 - no evidence of renal stones, mass, or other pathology that could cause microscopic hematuria    KUB 3/4/19 - no renal or ureteral stones detectable    US Scrotum 3/3/19 - small bilateral epididymal head cysts, otherwise no abnormalities    Assessment: Lola Stevens Jr. is a 48 y.o. male with microscopic hematuria    Plan:     1. Cystoscopy today, 4/29/19  2. Consents signed     Alonso Rodriguez MD

## 2019-04-29 NOTE — PATIENT INSTRUCTIONS
What to Expect After a Cystoscopy  For the next 24-48 hours, you may feel a mild burning when you urinate. This burning is normal and expected. Drink plenty of water to dilute the urine to help relieve the burning sensation. You may also see a small amount of blood in your urine after the procedure.    Unless you are already taking antibiotics, you may be given an antibiotic after the test to prevent infection.    Signs and Symptoms to Report  Call the [facility] at [phone] if you develop any of the following:  · Fever of 101 degrees or higher  · Chills or persistent bleeding  · Inability to urinate

## 2019-05-01 ENCOUNTER — PATIENT MESSAGE (OUTPATIENT)
Dept: GASTROENTEROLOGY | Facility: CLINIC | Age: 49
End: 2019-05-01

## 2019-05-10 ENCOUNTER — LAB VISIT (OUTPATIENT)
Dept: LAB | Facility: HOSPITAL | Age: 49
End: 2019-05-10
Payer: COMMERCIAL

## 2019-05-10 DIAGNOSIS — R14.0 BLOATING: ICD-10-CM

## 2019-05-10 LAB
BREATH H2 1.5H P LAC: 3 PPM
BREATH H2 1H P LAC: 3 PPM
BREATH H2 30M P LAC: 3 PPM
BREATH H2 P 10 G LACTULOSE PO: 3 PPM
BREATH H2 P 10 G LACTULOSE PO: 5 PPM
BREATH H2 PRE LAC: 2 PPM
LACTULOSE BT COL PERIOD: NORMAL HR
LACTULOSE BT INTERPRETATION: NORMAL

## 2019-05-10 PROCEDURE — 91065 BREATH HYDROGEN/METHANE TEST: CPT

## 2019-05-17 ENCOUNTER — PATIENT MESSAGE (OUTPATIENT)
Dept: GASTROENTEROLOGY | Facility: CLINIC | Age: 49
End: 2019-05-17

## 2019-05-24 DIAGNOSIS — K21.9 GASTROESOPHAGEAL REFLUX DISEASE, ESOPHAGITIS PRESENCE NOT SPECIFIED: Primary | ICD-10-CM

## 2019-05-24 RX ORDER — PANTOPRAZOLE SODIUM 40 MG/1
40 TABLET, DELAYED RELEASE ORAL DAILY
Qty: 365 TABLET | Refills: 0 | Status: SHIPPED | OUTPATIENT
Start: 2019-05-24 | End: 2020-02-26

## 2019-05-24 NOTE — TELEPHONE ENCOUNTER
----- Message from Eda Pardo sent at 5/24/2019 12:59 PM CDT -----  Contact: swapnil ornelas - wife - [phone]  terri    Needs Advice    Reason for call: states they sent a request to have refills on pantoprazole and gas medication an hour ago and they have not been called in        Communication Preference:swapnil onrelas - wife - [phone]    Additional Information:

## 2019-05-24 NOTE — TELEPHONE ENCOUNTER
MD Neris Ferguson MA   Caller: Unspecified (Today,  1:35 PM)             I dont have the script refills in my box.     Can they be sent to me and I'll refill them quickly.     Betsy      I don't see anything for gas medication

## 2019-05-29 ENCOUNTER — TELEPHONE (OUTPATIENT)
Dept: GASTROENTEROLOGY | Facility: CLINIC | Age: 49
End: 2019-05-29

## 2019-05-29 DIAGNOSIS — R10.9 ABDOMINAL PAIN, UNSPECIFIED ABDOMINAL LOCATION: Primary | ICD-10-CM

## 2019-05-29 RX ORDER — DICYCLOMINE HYDROCHLORIDE 10 MG/1
10 CAPSULE ORAL 3 TIMES DAILY PRN
Qty: 90 CAPSULE | Refills: 0 | Status: SHIPPED | OUTPATIENT
Start: 2019-05-29 | End: 2019-06-28

## 2019-05-29 NOTE — TELEPHONE ENCOUNTER
----- Message from Delores Andrea sent at 5/29/2019 11:24 AM CDT -----  Contact: [facility] [phone]                                Prescription Refill Request  Name of medication and dose dicyclomine HCl 10 mg Oral 3 times daily PRN     Pharmacy [facility]/pharmacy #[id number] - [place], [place] - [address] [phone]     Are you completely out of your medication YES    Additional Information:

## 2019-06-19 ENCOUNTER — PATIENT MESSAGE (OUTPATIENT)
Dept: GASTROENTEROLOGY | Facility: CLINIC | Age: 49
End: 2019-06-19

## 2019-06-21 ENCOUNTER — PATIENT MESSAGE (OUTPATIENT)
Dept: GASTROENTEROLOGY | Facility: CLINIC | Age: 49
End: 2019-06-21

## 2019-07-02 ENCOUNTER — TELEPHONE (OUTPATIENT)
Dept: GASTROENTEROLOGY | Facility: CLINIC | Age: 49
End: 2019-07-02

## 2019-07-02 ENCOUNTER — PATIENT MESSAGE (OUTPATIENT)
Dept: GASTROENTEROLOGY | Facility: CLINIC | Age: 49
End: 2019-07-02

## 2019-07-02 DIAGNOSIS — R10.9 ABDOMINAL PAIN, UNSPECIFIED ABDOMINAL LOCATION: Primary | ICD-10-CM

## 2019-07-02 RX ORDER — DICYCLOMINE HYDROCHLORIDE 10 MG/1
10 CAPSULE ORAL
Qty: 90 CAPSULE | Refills: 0 | Status: SHIPPED | OUTPATIENT
Start: 2019-07-02 | End: 2019-08-02

## 2019-07-02 NOTE — TELEPHONE ENCOUNTER
----- Message from Betsy Ramírez MD sent at 7/2/2019  4:09 PM CDT -----  Contact: [facility] [phone]  Hi Ms. Hernadez    I know this patient well so I will refill medication now.    I will also message his wife via [employer] as I received a notice that his PPI is not covered.    Thanks for all your help  ----- Message -----  From: Puja Jon MA  Sent: 7/2/2019   3:28 PM  To: Betsy Ramírez MD    Refill request for Dicyclomine 10 mg capsule, #90, take one capsule by mouth 3 times daily as needed.  LR 5/29/2019.    ,  I do not see this in his med card.   Maricarmen

## 2019-07-02 NOTE — TELEPHONE ENCOUNTER
----- Message from Puja Jon MA sent at 7/2/2019  3:28 PM CDT -----  Contact: [facility] [phone]  Refill request for Dicyclomine 10 mg capsule, #90, take one capsule by mouth 3 times daily as needed.  LR 5/29/2019.    ,  I do not see this in his med card.   Maricarmen

## 2019-07-30 ENCOUNTER — PATIENT MESSAGE (OUTPATIENT)
Dept: UROLOGY | Facility: CLINIC | Age: 49
End: 2019-07-30

## 2019-07-31 ENCOUNTER — PATIENT OUTREACH (OUTPATIENT)
Dept: ADMINISTRATIVE | Facility: OTHER | Age: 49
End: 2019-07-31

## 2019-08-01 ENCOUNTER — TELEPHONE (OUTPATIENT)
Dept: SPINE | Facility: CLINIC | Age: 49
End: 2019-08-01

## 2019-08-01 DIAGNOSIS — M54.5 LOW BACK PAIN, UNSPECIFIED BACK PAIN LATERALITY, UNSPECIFIED CHRONICITY, WITH SCIATICA PRESENCE UNSPECIFIED: Primary | ICD-10-CM

## 2019-08-01 NOTE — TELEPHONE ENCOUNTER
----- Message from Norma Watson PA-C sent at 8/1/2019  1:14 PM CDT -----  He needs lumbar XRs prior to his appt tomorrow.

## 2019-08-01 NOTE — PROGRESS NOTES
Subjective:      Patient ID: Lola Stevens Jr. is a 48 y.o. male.    Chief Complaint: Low-back Pain      HPI  ([facility])    History of GERD.     He has constant LBP with constant bilateral lateral leg pain to his feet x years. He also has pain into his groin on both sides. He has seen urology for this. Groin pain is his worse pain. LBP = leg pain. He has numbness, tingling, and weakness in his legs. Pain is better with standing. He is worse with sitting and driving. He rates his pain as a 7 on a scale of 1-10. Pain is constant aching with intermittent sharp pain. No known injury.     He has seen Dr. Mayberry at [facility] and did well with ESIs about 2 years ago. No PT or surgery on his back. He takes OTC tylenol with minimal relief. He has not been on neurotnin.       Review of Systems   Constitution: Positive for malaise/fatigue. Negative for fever, night sweats, weight gain and weight loss.   HENT: Negative for hearing loss, nosebleeds and odynophagia.    Eyes: Negative for blurred vision and double vision.   Cardiovascular: Negative for chest pain, irregular heartbeat and palpitations.   Respiratory: Negative for cough, hemoptysis, shortness of breath and wheezing.    Endocrine: Negative for cold intolerance and polydipsia.   Hematologic/Lymphatic: Does not bruise/bleed easily.   Skin: Negative for dry skin, poor wound healing, rash and suspicious lesions.   Musculoskeletal: Positive for back pain and muscle cramps.        See HPI for pertinent positives.   Gastrointestinal: Positive for abdominal pain and melena. Negative for bloating, constipation, diarrhea, hematochezia, nausea and vomiting.   Genitourinary: Negative for bladder incontinence, dysuria, hematuria, hesitancy and incomplete emptying.        Positive for dark/discolored urine, difficulty starting/ending urine stream, poor bladder control, loss of genital sensation, and any type sexual dysfunction.    Neurological: Positive for numbness,  paresthesias and weakness. Negative for disturbances in coordination, dizziness, focal weakness, headaches, loss of balance and seizures.        Positive for abnormal arm/leg sensations.    Psychiatric/Behavioral: Negative for depression and hallucinations. The patient is not nervous/anxious.            Objective:        General: Lola is well-developed, well-nourished, appears stated age, in no acute distress, alert and oriented to time, place and person.     General    Vitals reviewed.  Constitutional: He is oriented to person, place, and time. He appears well-developed and well-nourished.   Pulmonary/Chest: Effort normal.   Abdominal: He exhibits no distension.   Neurological: He is alert and oriented to person, place, and time.   Psychiatric: He has a normal mood and affect. His behavior is normal. Judgment and thought content normal.           Gait: normal, tandem walking is normal and he is able to heel/toe stand.     On exam of the lumbar spine, Inspection of back is normal, mild mid central lumbar tenderness.     Skin in lumbar region is warm to the touch without visible rashes.     muscle tone normal without spasm, limited range of motion with pain  Pain in flexion. and Pain in extension.    Strength testing of the bilateral LEs shows  Right hip abduction:  +5/5  Left hip abduction:  +5/5  Right hip flexion:  +5/5  Left hip flexion:  +5/5  Right hip extensors:  +5/5  Left hip extensors:  +5/5  Right quadriceps:  +5/5  Left quadriceps:  +5/5  Right hamstring:  +5/5  Left hamstring:  +5/5  Right dorsiflexion:  +5/5  Left dorsiflexion:  +5/5  Right plantar flexion:  +5/5  Left plantar flexion:  +5/5   Right EHL:  +5/5   Left EHL:  +5/5    negative clonus of bilateral LEs.     negative straight leg raise on bilateral LEs.     DTRs:  Right patellar:  +2     Left patellar:  +2  Right achilles:  +2   Left achilles:  +2    Sensation is grossly intact in L2, L3, L4, L5, and S1 distribution.    Right hip has no pain  with IR/ER. Left hip has no pain with IR/ER.     On exam of bilateral UEs, patient has full painfree ROM with no signs of clubbing, laxity, cyanosis, edema, instability, weakness, or tenderness.       XRAY INTERPRETATION:  X-rays of lumbar spine (AP/lat/flex/ext) dated 8/2/19 are personally reviewed and show slight retrolisthesis L4-L5 and L5-S1, mild DDD L4-L5 and moderate DDD L5-S1.        Assessment:       1. Chronic bilateral low back pain with bilateral sciatica    2. DDD (degenerative disc disease), lumbosacral    3. Thoracic and lumbosacral neuritis    4. Acquired spondylolisthesis           Plan:       Orders Placed This Encounter    MRI LUMBAR SPINE WITHOUT CONTRAST    meloxicam (MOBIC) 15 MG tablet    diazePAM (VALIUM) 5 MG tablet       He has constant LBP with constant bilateral lateral leg pain to his feet x years. He also has pain into his groin on both sides. He has seen urology for this. Groin pain is his worse pain. LBP = leg pain. Known slight retrolisthesis L4-L5 and L5-S1, mild DDD L4-L5 and moderate DDD L5-S1. LBP and leg pain likely from L4-S1. Groin pain may be from L2-L3 and/or L3-L4. Treatment options reviewed with patient along with above lumbar XRs. Following plan made:     - MRI of lumbar spine to further evaluate bilateral LE radiculitis. Given valium.   - New prescription for mobic to take daily with food. Reviewed dosing and side effects.   - Consider neurontin- he is worried about side effects so will hold off for now. He is a  and cannot be sleepy at work.   - Will get old records from Dr. Mayberry- [facility] filled out and sent.   - Depending on results of MRI, may consider injections and/or PT.     ADDENDUM 8/16/19:  Records received from Dr. Mayberry. He had right L5 TF and right S1 TF ALBIN on 12/15/14 and he had left L5 TF with left S1 TF ALBIN on 7/1/15. Both injections were with Dr. Davies. Scanned into chart.     Follow-up: Follow up in about 2 weeks (around 8/16/2019). If there  are any questions prior to this, the patient was instructed to contact the office.

## 2019-08-02 ENCOUNTER — HOSPITAL ENCOUNTER (OUTPATIENT)
Dept: RADIOLOGY | Facility: OTHER | Age: 49
Discharge: HOME | End: 2019-08-02
Attending: PHYSICIAN ASSISTANT
Payer: COMMERCIAL

## 2019-08-02 ENCOUNTER — OFFICE VISIT (OUTPATIENT)
Dept: SPINE | Facility: CLINIC | Age: 49
End: 2019-08-02
Payer: COMMERCIAL

## 2019-08-02 ENCOUNTER — OFFICE VISIT (OUTPATIENT)
Dept: UROLOGY | Facility: CLINIC | Age: 49
End: 2019-08-02
Payer: COMMERCIAL

## 2019-08-02 VITALS
HEIGHT: 70 IN | SYSTOLIC BLOOD PRESSURE: 128 MMHG | WEIGHT: 195 LBS | HEART RATE: 66 BPM | BODY MASS INDEX: 27.92 KG/M2 | DIASTOLIC BLOOD PRESSURE: 79 MMHG

## 2019-08-02 VITALS
DIASTOLIC BLOOD PRESSURE: 86 MMHG | BODY MASS INDEX: 28.35 KG/M2 | SYSTOLIC BLOOD PRESSURE: 141 MMHG | HEART RATE: 57 BPM | WEIGHT: 198 LBS | HEIGHT: 70 IN

## 2019-08-02 DIAGNOSIS — M54.41 CHRONIC BILATERAL LOW BACK PAIN WITH BILATERAL SCIATICA: Primary | ICD-10-CM

## 2019-08-02 DIAGNOSIS — M54.14 THORACIC AND LUMBOSACRAL NEURITIS: ICD-10-CM

## 2019-08-02 DIAGNOSIS — M54.5 LOW BACK PAIN, UNSPECIFIED BACK PAIN LATERALITY, UNSPECIFIED CHRONICITY, WITH SCIATICA PRESENCE UNSPECIFIED: ICD-10-CM

## 2019-08-02 DIAGNOSIS — M51.37 DDD (DEGENERATIVE DISC DISEASE), LUMBOSACRAL: ICD-10-CM

## 2019-08-02 DIAGNOSIS — M54.17 THORACIC AND LUMBOSACRAL NEURITIS: ICD-10-CM

## 2019-08-02 DIAGNOSIS — N53.12 PAINFUL EJACULATION: ICD-10-CM

## 2019-08-02 DIAGNOSIS — M43.10 ACQUIRED SPONDYLOLISTHESIS: ICD-10-CM

## 2019-08-02 DIAGNOSIS — N41.0 ACUTE PROSTATITIS: Primary | ICD-10-CM

## 2019-08-02 DIAGNOSIS — M54.42 CHRONIC BILATERAL LOW BACK PAIN WITH BILATERAL SCIATICA: Primary | ICD-10-CM

## 2019-08-02 DIAGNOSIS — G89.29 CHRONIC BILATERAL LOW BACK PAIN WITH BILATERAL SCIATICA: Primary | ICD-10-CM

## 2019-08-02 LAB
BILIRUB SERPL-MCNC: NORMAL MG/DL
BLOOD URINE, POC: NORMAL
COLOR, POC UA: NORMAL
GLUCOSE UR QL STRIP: NORMAL
KETONES UR QL STRIP: NORMAL
LEUKOCYTE ESTERASE URINE, POC: NORMAL
NITRITE, POC UA: NORMAL
PH, POC UA: NORMAL
POC RESIDUAL URINE VOLUME: 150 ML (ref 0–100)
PROTEIN, POC: NORMAL
SPECIFIC GRAVITY, POC UA: NORMAL
UROBILINOGEN, POC UA: NORMAL

## 2019-08-02 PROCEDURE — 81002 PR URINALYSIS NONAUTO W/O SCOPE: ICD-10-PCS | Mod: S$GLB,,, | Performed by: NURSE PRACTITIONER

## 2019-08-02 PROCEDURE — 51798 PR MEAS,POST-VOID RES,US,NON-IMAGING: ICD-10-PCS | Mod: S$GLB,,, | Performed by: NURSE PRACTITIONER

## 2019-08-02 PROCEDURE — 72100 XR LUMBAR SPINE AP AND LAT WITH FLEX/EXT: ICD-10-PCS | Mod: 26,,, | Performed by: RADIOLOGY

## 2019-08-02 PROCEDURE — 87086 URINE CULTURE/COLONY COUNT: CPT

## 2019-08-02 PROCEDURE — 72120 X-RAY BEND ONLY L-S SPINE: CPT | Mod: TC,FY

## 2019-08-02 PROCEDURE — 72100 X-RAY EXAM L-S SPINE 2/3 VWS: CPT | Mod: 26,,, | Performed by: RADIOLOGY

## 2019-08-02 PROCEDURE — 99999 PR PBB SHADOW E&M-EST. PATIENT-LVL III: CPT | Mod: PBBFAC,,, | Performed by: PHYSICIAN ASSISTANT

## 2019-08-02 PROCEDURE — 72120 XR LUMBAR SPINE AP AND LAT WITH FLEX/EXT: ICD-10-PCS | Mod: 26,,, | Performed by: RADIOLOGY

## 2019-08-02 PROCEDURE — 99999 PR PBB SHADOW E&M-EST. PATIENT-LVL III: CPT | Mod: PBBFAC,,, | Performed by: NURSE PRACTITIONER

## 2019-08-02 PROCEDURE — 99999 PR PBB SHADOW E&M-EST. PATIENT-LVL III: ICD-10-PCS | Mod: PBBFAC,,, | Performed by: PHYSICIAN ASSISTANT

## 2019-08-02 PROCEDURE — 99204 OFFICE O/P NEW MOD 45 MIN: CPT | Mod: S$GLB,,, | Performed by: PHYSICIAN ASSISTANT

## 2019-08-02 PROCEDURE — 99214 OFFICE O/P EST MOD 30 MIN: CPT | Mod: 25,S$GLB,, | Performed by: NURSE PRACTITIONER

## 2019-08-02 PROCEDURE — 72120 X-RAY BEND ONLY L-S SPINE: CPT | Mod: 26,,, | Performed by: RADIOLOGY

## 2019-08-02 PROCEDURE — 51798 US URINE CAPACITY MEASURE: CPT | Mod: S$GLB,,, | Performed by: NURSE PRACTITIONER

## 2019-08-02 PROCEDURE — 99204 PR OFFICE/OUTPT VISIT, NEW, LEVL IV, 45-59 MIN: ICD-10-PCS | Mod: S$GLB,,, | Performed by: PHYSICIAN ASSISTANT

## 2019-08-02 PROCEDURE — 99999 PR PBB SHADOW E&M-EST. PATIENT-LVL III: ICD-10-PCS | Mod: PBBFAC,,, | Performed by: NURSE PRACTITIONER

## 2019-08-02 PROCEDURE — 81002 URINALYSIS NONAUTO W/O SCOPE: CPT | Mod: S$GLB,,, | Performed by: NURSE PRACTITIONER

## 2019-08-02 PROCEDURE — 99214 PR OFFICE/OUTPT VISIT, EST, LEVL IV, 30-39 MIN: ICD-10-PCS | Mod: 25,S$GLB,, | Performed by: NURSE PRACTITIONER

## 2019-08-02 RX ORDER — DIAZEPAM 5 MG/1
TABLET ORAL
Qty: 2 TABLET | Refills: 0 | Status: SHIPPED | OUTPATIENT
Start: 2019-08-02 | End: 2020-06-11

## 2019-08-02 RX ORDER — MELOXICAM 15 MG/1
15 TABLET ORAL DAILY
Qty: 30 TABLET | Refills: 1 | Status: SHIPPED | OUTPATIENT
Start: 2019-08-02 | End: 2020-02-03 | Stop reason: SDUPTHER

## 2019-08-02 NOTE — PATIENT INSTRUCTIONS
Prostatitis    The prostate gland is located deep inside the body at the base of the bladder. Prostatitis is an inflammation of the prostate gland. This can occur with or without infection. Most cases of prostatitis are long term (chronic). Most do not involve a bacterial infection.  · Chronic prostatitis is more common in older men. It is usually an inflammatory condition and not an infection. But, bacterial infection can also cause chronic prostatitis. It can cause pain in the rectum, urethra, bladder, or scrotum. It can also make you unable to fully empty the bladder.  You may urinate often, or have burning with urination. Prostatitis may also cause painful ejaculation and erectile dysfunction.  · Sudden onset (acute) prostatitis usually occurs in men younger than 35. It is from a bacterial infection. You may have severe symptoms such as fever, chills, muscle aches, and pain in the area between the scrotum and anus (perineum). You may have a hard time urinating, or have pain or burning when urinating. There may be blood or pus in the urine.  Your healthcare provider may do a culture test on prostate fluids or discharge from the penis. This will help determine if bacteria are the cause. Treatment can include antibiotics, anti-inflammatory medicine, prostate medicines, and stool softeners.  Home care  These guidelines will help you care for yourself at home:  · Rest at home until the fever is gone and you are feeling better.  · A hot sitz bath may offer some relief. Fill a tub with 6 inches of hot water. Allow the water to run so you can keep it hot for 10 to 15 minutes.  · Drink plenty of fluids. Do not drink alcohol or caffeine until all symptoms are gone.  · If your healthcare gives you an antibiotic, take it exactly as you are told. Take it until it is all gone.  · Constipation causes straining and pain. Avoid constipation by eating natural laxatives such as prunes, fresh fruits, and whole-grain cereals. If  needed, use a mild over-the-counter (OTC) laxative for constipation. An OTC stool softener may be used to keep the stools soft.  · If sex is uncomfortable or painful, avoid until symptoms get better.  · You may use OTC medicines for pain and fever, unless another medicine was given. If you have chronic liver or kidney disease, talk with your healthcare provider before using these medicines. Also talk with your provider if you've ever had a stomach ulcer or GI bleeding.  Follow-up care  Follow up with your healthcare provider, a urologist, or as advised to be sure you are responding to treatment. Your healthcare provider may want to see you after you finish your antibiotics to be sure the infection has cleared. If a culture was taken, you may call for the results as directed. A culture test can help your healthcare provider know if you are on the correct antibiotic.  Call 911  Call 911 if any of these occur:  · Weakness, dizziness, or fainting  When to seek medical advice  Call your healthcare provider right away if any of these occur:  · Fever of 100.4°F (38°C) or higher after 3 days of treatment, or as advised  · Unable to pass urine for 8 hours  · Pressure or pain in your bladder gets worse  · Painful swelling of the testicle or scrotum  Date Last Reviewed: 10/1/2016  © 9219-6372 [employer], [employer]. [address] [place] [address], [place] [address]. All rights reserved. This information is not intended as a substitute for professional medical care. Always follow your healthcare professional's instructions.

## 2019-08-02 NOTE — PATIENT INSTRUCTIONS
It was good to meet you today!    You have some mild wear and tear (arthritis) in your back at L4-L5 and L5-S1 with degeneration of the discs. This is likely causing your back and leg pain. Groin pain may be from higher up in lumbar spine.     I want to get an MRI to look into things further. I gave you some valium to take prior to this. You will need a .     I also sent meloxicam to your pharmacy. This is for inflammation and pain. It can upset your stomach so you need to eat with it. It should not make you sleepy or loopy.     We can consider adding neurontin/gabapentin to help with nerve pain if things get worse. We can also consider injections and PT.     I will get your records from Dr. Mayberry. I will see you back after the MRI, but please stay in touch and email me if you need anything.     Norma

## 2019-08-02 NOTE — PROGRESS NOTES
"CHIEF COMPLAINT:    Mr. Stevens is a 48 y.o. male presenting for unresolving lower abd pain.    PRESENTING ILLNESS:    Lola Stevens Jr. is a 48 y.o. male w/ h/o HTN, tobacco use, microscopic hematuria (unremarkable cysto and CTU in 2019).    Pt last seen in clinic 3/14/19 for "bladder pain and pressure." At that time reported improvement after starting tamsulosin.    Today pt returns to clinic reporting L lwr abd, mid-lwr back pain, and unresolving intermittent bladder pressure. He is followed by spine service. He had L spine xray today - revealed Disc narrowing at L4-5 and L5-S1. Reports increased in urinary frequency, intermittent burning w/ end of urination, and intermittent burning w/ ejaculation. Denies GH, obstructive urinary sxs. Reports strong FOS. Adherent to tamsulosin as rx'd. He continues to consume 12pk+ beer/weekend.    UA dip in clinic today revealed trace blood.    PVR in clinic today: 150mL.    REVIEW OF SYSTEMS:    Lola Stevens Jr. denies headache, blurred vision, fever, nausea, vomiting, chills, abdominal pain, pelvic pain, flank pain, bleeding per rectum, cough, SOB, recent loss of consciousness, recent mental status changes, seizures, dizziness, or upper or lower extremity weakness.    PATIENT HISTORY:    Past Medical History:   Diagnosis Date    Diverticulosis     GERD (gastroesophageal reflux disease)     Hemorrhoids        Past Surgical History:   Procedure Laterality Date    CHOLECYSTECTOMY      COLONOSCOPY      COLONOSCOPY N/A 10/17/2018    Performed by [facility] at [facility] (4TH F[facility])    EGD (ESOPHAGOGASTRODUODENOSCOPY) N/A 10/17/2018    Performed by [facility] at [facility] (4TH FLR)    EGD (ESOPHAGOGASTRODUODENOSCOPY) N/A 11/20/2013    Performed by [facility]os [facility]    ESOPHAGOGASTRODUODENOSCOPY         Family History   Problem Relation Age of Onset    Heart disease Father     Colon cancer Neg Hx     Esophageal cancer Neg Hx  "    Stomach cancer Neg Hx     Ulcerative colitis Neg Hx     Crohn's disease Neg Hx     Celiac disease Neg Hx     Irritable bowel syndrome Neg Hx        Social History     Socioeconomic History    Marital status:      Spouse name: Not on file    Number of children: Not on file    Years of education: Not on file    Highest education level: Not on file   Occupational History    Not on file   Social Needs    Financial resource strain: Not on file    Food insecurity:     Worry: Not on file     Inability: Not on file    Transportation needs:     Medical: Not on file     Non-medical: Not on file   Tobacco Use    Smoking status: Former Smoker     Packs/day: 1.00     Years: 15.00     Pack years: 15.00     Types: Cigarettes     Last attempt to quit: 10/17/2002     Years since quittin.8    Smokeless tobacco: Former User     Quit date: 2018    Tobacco comment: quit smoking 15 years ago   Substance and Sexual Activity    Alcohol use: Yes     Comment: on weekends    Drug use: No    Sexual activity: Not on file   Lifestyle    Physical activity:     Days per week: Not on file     Minutes per session: Not on file    Stress: Not on file   Relationships    Social connections:     Talks on phone: Not on file     Gets together: Not on file     Attends Voodoo service: Not on file     Active member of club or organization: Not on file     Attends meetings of clubs or organizations: Not on file     Relationship status: Not on file   Other Topics Concern    Not on file   Social History Narrative    Not on file       Allergies:  Patient has no known allergies.    Medications:    Current Outpatient Medications:     diazePAM (VALIUM) 5 MG tablet, 1 po 30 minutes prior to MRI, may repeat x 1 right before MRI if needed. Will need ., Disp: 2 tablet, Rfl: 0    meloxicam (MOBIC) 15 MG tablet, Take 1 tablet (15 mg total) by mouth once daily. Take with food., Disp: 30 tablet, Rfl: 1    pantoprazole  (PROTONIX) 40 MG tablet, Take 1 tablet (40 mg total) by mouth once daily., Disp: 365 tablet, Rfl: 0    tamsulosin (FLOMAX) 0.4 mg Cap, Take 1 capsule (0.4 mg total) by mouth once daily., Disp: 30 capsule, Rfl: 11    PHYSICAL EXAMINATION:    The patient generally appears in good health, is appropriately interactive, and is in no apparent distress.     Eyes: anicteric sclerae, moist conjunctivae; no lid-lag; PERRLA     HENT: Atraumatic; oropharynx clear with moist mucous membranes and no mucosal ulcerations;normal hard and soft palate. No evidence of lymphadenopathy.    Neck: Trachea midline.  No thyromegaly.    Musculoskeletal: No abnormal gait.    Skin: No lesions.    Mental: Cooperative with normal affect.  Is oriented to time, place, and person.    Neuro: Grossly intact.    Chest: Normal inspiratory effort.   No accessory muscles.  No audible wheezes.  Respirations symmetric on inspiration and expiration.    Heart: Regular rhythm.      Abdomen:  Soft, non-tender. No masses or organomegaly. Bladder is not palpable. No evidence of flank discomfort. No evidence of inguinal hernia.    Penis is circumcised. No penile discharge. Meatus w/o stenosis or lesions. Phallus unremarkable. No scrotal enlargement/edema/swelling/lesions. Epididymis unremarkable bilaterally. Testicles normal w/o nodules or tenderness. No inguinal hernias or lymphadenopathy.    The prostate is moderately enlarged, smooth, symmetrical, NT, without nodule or induration. The seminal vesicles were normal and nonpalpable. The prostate was not tender or boggy. Rectal tone was normal no rectal mass was palpable.    Extremities: No clubbing, cyanosis, or edema.    LABS:    Lab Results   Component Value Date    CREATININE 1.1 03/04/2019    CREATININE 0.9 03/03/2019    CREATININE 1.0 11/27/2018       Hemoglobin A1C   Date Value Ref Range Status   03/04/2019 5.1 4.0 - 5.6 % Final     Comment:     ADA Screening Guidelines:  5.7-6.4%  Consistent with  prediabetes  >or=6.5%  Consistent with diabetes  High levels of fetal hemoglobin interfere with the HbA1C  assay. Heterozygous hemoglobin variants (HbS, HgC, etc)do  not significantly interfere with this assay.   However, presence of multiple variants may affect accuracy.         Lab Results   Component Value Date    LABURIN No growth 03/13/2019    LABURIN No growth 01/10/2019    LABURIN  11/27/2018     ENTEROCOCCUS FAECALIS  10,000 - 49,999 cfu/ml  No other significant isolate        Imaging:  Scrotal and testicular US (3/3/19):       FINDINGS:  Right Testicle:    *Size: 4.0 x 2.9 x 2.3 cm  *Appearance: Normal.  *Flow: Normal arterial and venous flow  *Epididymis: 0.2 cm epididymal head cyst.  Otherwise normal.  *Hydrocele: None.  *Varicocele: None.  .  Left Testicle:    *Size: 4.0 x 2.7 x 2.2 cm  *Appearance: Normal.  *Flow: Normal arterial and venous flow  *Epididymis: 0.5 cm epididymal head cyst.  Otherwise normal.  *Hydrocele: None.  *Varicocele: None.  .  Other findings: None.         IMPRESSION:    Acute prostatitis  -     POCT URINE DIPSTICK WITHOUT MICROSCOPE  -     POCT Bladder Scan  -     Urine culture    Painful ejaculation  -     POCT URINE DIPSTICK WITHOUT MICROSCOPE  -     POCT Bladder Scan  -     Urine culture      PLAN:    I spent 25 minutes with the patient of which more than half was spent in direct consultation with the patient in regards to our treatment and plan.    Discussed and reviewed prostatitis, potential etiologies including but not limited to inflammation, or infection. Urine sent for culture. Discussed and recommend start NSAIDs (ibuprofen/naproxen) as directed TID x2 wks, take w/ food. Discussed supportive care w/ weight management, regular physical activity, frequent ejaculation, prostatic massages, proper hydration, avoid sitting for long periods, avoid caffeine/alcohol/spicy and acidic food and beverages.    Education sheets provided.    Follow up in about 2 months (around  10/2/2019) for prostatitis.    Pt expressed understanding and agree w/ plan.

## 2019-08-04 LAB — BACTERIA UR CULT: NORMAL

## 2019-08-20 ENCOUNTER — PATIENT MESSAGE (OUTPATIENT)
Dept: SPINE | Facility: CLINIC | Age: 49
End: 2019-08-20

## 2019-08-28 ENCOUNTER — PATIENT MESSAGE (OUTPATIENT)
Dept: SPINE | Facility: CLINIC | Age: 49
End: 2019-08-28

## 2019-08-29 ENCOUNTER — PATIENT MESSAGE (OUTPATIENT)
Dept: SPINE | Facility: CLINIC | Age: 49
End: 2019-08-29

## 2019-08-30 ENCOUNTER — PATIENT MESSAGE (OUTPATIENT)
Dept: SPINE | Facility: CLINIC | Age: 49
End: 2019-08-30

## 2019-09-03 ENCOUNTER — PATIENT OUTREACH (OUTPATIENT)
Dept: ADMINISTRATIVE | Facility: OTHER | Age: 49
End: 2019-09-03

## 2019-09-04 NOTE — PROGRESS NOTES
Subjective:      Patient ID: Lola Stevens Jr. is a 48 y.o. male.    Chief Complaint: Low-back Pain      HPI  ([facility])    History of GERD.     Known slight retrolisthesis L4-L5 and L5-S1, mild DDD L4-L5 and moderate DDD L5-S1. LBP and leg pain likely from L4-S1. Groin pain may be from L2-L3 and/or L3-L4. He had right L5 TF and right S1 TF ALBIN on 12/15/14 and he had left L5 TF with left S1 TF ALBIN on 7/1/15. Both injections were with Dr. Davies.    Given mobic at last visit and is here to review his lumbar MRI.     He has seen some minimal improvement with mobic. He continues with constant LBP with constant bilateral lateral leg pain to his feet and pain into his groin on both sides. Groin pain is his worse pain. LBP = leg pain. He has numbness, tingling, and weakness in his legs. Pain is better with standing. He is worse with sitting and driving. He rates his pain as a 7 on a scale of 1-10. Pain is constant aching with intermittent sharp pain. No PT or surgery on his back. Lumbar ESIs as above years ago.       Review of Systems   Constitution: Negative for chills, fever, night sweats and weight gain.   Gastrointestinal: Negative for bowel incontinence, nausea and vomiting.   Genitourinary: Negative for bladder incontinence.   Neurological: Negative for disturbances in coordination and loss of balance.           Objective:        General: Lola is well-developed, well-nourished, appears stated age, in no acute distress, alert and oriented to time, place and person.     Ortho/SPM Exam     Patient sits comfortably in the exam room and answers questions appropriately. Grossly patient is able to move bilateral LEs without difficulty. Ambulates normally.       XRAY INTERPRETATION:  MRI of lumbar spine dated 8/30/19 is personally reviewed and shows disc bulge/annular tear L2-L3 with facet hypertrophy L2-L4. Disc bulge/facet hypertrophy L4-L5 with moderate right foraminal stenosis. Disc bulge/facet hypertrophy L5-S1  with DDD and moderate bilateral foraminal stenosis.         Assessment:       1. Chronic bilateral low back pain with bilateral sciatica    2. Thoracic and lumbosacral neuritis    3. DDD (degenerative disc disease), lumbosacral    4. Acquired spondylolisthesis    5. Lumbar foraminal stenosis           Plan:       Orders Placed This Encounter    Procedure Order to [facility]       He has constant LBP with constant bilateral lateral leg pain to his feet with pain into his groin on both sides. He has seen urology for this. Groin pain is his worse pain. LBP = leg pain. Known slight retrolisthesis L4-L5 and L5-S1, with facet hypertrophy and DDD at both levels. He has moderate right foraminal stenosis L4-L5 and moderate bilateral foraminal stenosis L5-S1. Don't see any compression lesions at L2-L3 or L3-L4 that would be causing groin pain. Primary pain generators are likely L4-L5 and L5-S1.Treatment options reviewed with patient along with above lumbar MRI. Following plan made:     - Set up for bilateral L5 TF ALBIN with pain management. He wants it to be done on a Friday.   - Continue mobic daily with food.   - Consider neurontin- he is worried about side effects so will hold off for now. He is a  and cannot be sleepy at work.   - Recommend PT for lumbar spine. He wants to start working out on his own. Will email if he decides to do PT.     Follow-up: Follow up in about 6 weeks (around 10/18/2019). If there are any questions prior to this, the patient was instructed to contact the office.

## 2019-09-05 ENCOUNTER — PATIENT MESSAGE (OUTPATIENT)
Dept: UROLOGY | Facility: CLINIC | Age: 49
End: 2019-09-05

## 2019-09-06 ENCOUNTER — OFFICE VISIT (OUTPATIENT)
Dept: SPINE | Facility: CLINIC | Age: 49
End: 2019-09-06
Payer: COMMERCIAL

## 2019-09-06 VITALS
HEIGHT: 70 IN | BODY MASS INDEX: 28.35 KG/M2 | DIASTOLIC BLOOD PRESSURE: 88 MMHG | WEIGHT: 198 LBS | HEART RATE: 83 BPM | SYSTOLIC BLOOD PRESSURE: 139 MMHG

## 2019-09-06 DIAGNOSIS — M51.37 DDD (DEGENERATIVE DISC DISEASE), LUMBOSACRAL: ICD-10-CM

## 2019-09-06 DIAGNOSIS — M54.42 CHRONIC BILATERAL LOW BACK PAIN WITH BILATERAL SCIATICA: Primary | ICD-10-CM

## 2019-09-06 DIAGNOSIS — G89.29 CHRONIC BILATERAL LOW BACK PAIN WITH BILATERAL SCIATICA: Primary | ICD-10-CM

## 2019-09-06 DIAGNOSIS — M48.061 LUMBAR FORAMINAL STENOSIS: ICD-10-CM

## 2019-09-06 DIAGNOSIS — M54.17 THORACIC AND LUMBOSACRAL NEURITIS: ICD-10-CM

## 2019-09-06 DIAGNOSIS — M43.10 ACQUIRED SPONDYLOLISTHESIS: ICD-10-CM

## 2019-09-06 DIAGNOSIS — M54.14 THORACIC AND LUMBOSACRAL NEURITIS: ICD-10-CM

## 2019-09-06 DIAGNOSIS — M54.41 CHRONIC BILATERAL LOW BACK PAIN WITH BILATERAL SCIATICA: Primary | ICD-10-CM

## 2019-09-06 PROCEDURE — 99214 OFFICE O/P EST MOD 30 MIN: CPT | Mod: S$GLB,,, | Performed by: PHYSICIAN ASSISTANT

## 2019-09-06 PROCEDURE — 99999 PR PBB SHADOW E&M-EST. PATIENT-LVL III: ICD-10-PCS | Mod: PBBFAC,,, | Performed by: PHYSICIAN ASSISTANT

## 2019-09-06 PROCEDURE — 99214 PR OFFICE/OUTPT VISIT, EST, LEVL IV, 30-39 MIN: ICD-10-PCS | Mod: S$GLB,,, | Performed by: PHYSICIAN ASSISTANT

## 2019-09-06 PROCEDURE — 99999 PR PBB SHADOW E&M-EST. PATIENT-LVL III: CPT | Mod: PBBFAC,,, | Performed by: PHYSICIAN ASSISTANT

## 2019-09-06 NOTE — PATIENT INSTRUCTIONS
It was good to see you again today.     You MRI shows degeneration that is worse at L4-L5 and L5-S1. I don't see any nerves being compressed that would be causing your groin pain. At L4-L5, you have some arthritis in the joints (facets) with some irritation of nerve on the right. At L5-S1, you also have arthritis in the joints with irritation of nerves on both the right and left. In addition, you have some wear and tear (degeneration) of the discs at both these levels.     I recommend an injection on both sides at L5-S1. This will cover both L5-S1 and L4-L5. Pain management will call you to set this up.     Continue meloxicam daily with food.     Start getting back to working out on your own. Okay to try running (go slow). Avoid anything that aggravates your pain.     I will see you back in 6-8 weeks, but please stay in touch and email me if you need anything.

## 2019-09-09 ENCOUNTER — TELEPHONE (OUTPATIENT)
Dept: PAIN MEDICINE | Facility: CLINIC | Age: 49
End: 2019-09-09

## 2019-09-09 DIAGNOSIS — M48.061 LUMBAR FORAMINAL STENOSIS: Primary | ICD-10-CM

## 2019-09-11 ENCOUNTER — PATIENT MESSAGE (OUTPATIENT)
Dept: UROLOGY | Facility: CLINIC | Age: 49
End: 2019-09-11

## 2019-09-11 ENCOUNTER — PATIENT MESSAGE (OUTPATIENT)
Dept: GASTROENTEROLOGY | Facility: CLINIC | Age: 49
End: 2019-09-11

## 2019-09-12 DIAGNOSIS — N41.0 ACUTE PROSTATITIS: Primary | ICD-10-CM

## 2019-09-19 ENCOUNTER — TELEPHONE (OUTPATIENT)
Dept: PAIN MEDICINE | Facility: CLINIC | Age: 49
End: 2019-09-19

## 2019-09-19 ENCOUNTER — PATIENT MESSAGE (OUTPATIENT)
Dept: UROLOGY | Facility: CLINIC | Age: 49
End: 2019-09-19

## 2019-09-19 NOTE — TELEPHONE ENCOUNTER
----- Message from Sammi Schulte sent at 9/19/2019  4:02 PM CDT -----  Pt wife Maria G called to cancel pt procedure for 9/20/19 due to conflict of pt work scheduled.    Maria G can be reached [phone]     Thank you!

## 2019-09-20 ENCOUNTER — PATIENT MESSAGE (OUTPATIENT)
Dept: UROLOGY | Facility: CLINIC | Age: 49
End: 2019-09-20

## 2019-09-23 ENCOUNTER — TELEPHONE (OUTPATIENT)
Dept: UROLOGY | Facility: CLINIC | Age: 49
End: 2019-09-23

## 2019-09-23 ENCOUNTER — TELEPHONE (OUTPATIENT)
Dept: PAIN MEDICINE | Facility: CLINIC | Age: 49
End: 2019-09-23

## 2019-09-23 ENCOUNTER — PATIENT MESSAGE (OUTPATIENT)
Dept: UROLOGY | Facility: CLINIC | Age: 49
End: 2019-09-23

## 2019-09-23 NOTE — TELEPHONE ENCOUNTER
Reports recurrence of pelvic pain.  Requesting abx.  Denies f/c.  Discussed unremarkable urine cx on 9/18/19.  Reports adherent to tamsulosin.  Recommend RTC for further evaluation. Pt amenable to plan.  Nurse Em will schedule appt.   yes

## 2019-09-23 NOTE — TELEPHONE ENCOUNTER
Spoke to patients wife Maria G to reschedule procedure. New date and time given. Patients wife verbalized understanding.

## 2019-09-23 NOTE — TELEPHONE ENCOUNTER
----- Message from Renea King sent at 9/23/2019  1:29 PM CDT -----  Contact: dimitri pt spouse   Name of Who is Calling: dimitri pt spouse     What is the request in detail: dimitri would like to reschedule procedure. Please call     Can the clinic reply by [facility]: no    What Number to Call Back if not in [facility]: [phone]

## 2019-09-26 ENCOUNTER — TELEPHONE (OUTPATIENT)
Dept: PAIN MEDICINE | Facility: CLINIC | Age: 49
End: 2019-09-26

## 2019-09-26 NOTE — TELEPHONE ENCOUNTER
----- Message from Leana Wagoner sent at 9/26/2019  4:28 PM CDT -----  Contact: self- [phone]  Pt called and stated that he will not be able to make it to the appointment on 9/27/2019 for 8:30 am,Pt stated he has been called into work.      Thank you

## 2019-09-26 NOTE — TELEPHONE ENCOUNTER
Staff returned call to pt     Pt stated he needs to reschedule procedure for 9/27/19  because he cant get off from work    Pt was informed staff will notify the schedulers  to contact him to reschedule     pt gave verbal understanding

## 2019-10-02 ENCOUNTER — PATIENT OUTREACH (OUTPATIENT)
Dept: ADMINISTRATIVE | Facility: OTHER | Age: 49
End: 2019-10-02

## 2019-10-15 ENCOUNTER — PATIENT OUTREACH (OUTPATIENT)
Dept: ADMINISTRATIVE | Facility: OTHER | Age: 49
End: 2019-10-15

## 2019-10-25 ENCOUNTER — HOSPITAL ENCOUNTER (OUTPATIENT)
Facility: OTHER | Age: 49
Discharge: HOME | End: 2019-10-25
Attending: ANESTHESIOLOGY | Admitting: ANESTHESIOLOGY
Payer: COMMERCIAL

## 2019-10-25 VITALS
OXYGEN SATURATION: 98 % | RESPIRATION RATE: 18 BRPM | HEART RATE: 70 BPM | DIASTOLIC BLOOD PRESSURE: 79 MMHG | TEMPERATURE: 98 F | SYSTOLIC BLOOD PRESSURE: 139 MMHG | BODY MASS INDEX: 28.63 KG/M2 | HEIGHT: 70 IN | WEIGHT: 200 LBS

## 2019-10-25 DIAGNOSIS — M54.16 LUMBAR RADICULOPATHY: Primary | ICD-10-CM

## 2019-10-25 DIAGNOSIS — G89.29 CHRONIC PAIN: ICD-10-CM

## 2019-10-25 PROCEDURE — 25500020 PHARM REV CODE 255: Performed by: ANESTHESIOLOGY

## 2019-10-25 PROCEDURE — 64483 NJX AA&/STRD TFRM EPI L/S 1: CPT | Mod: 50 | Performed by: ANESTHESIOLOGY

## 2019-10-25 PROCEDURE — 64483 NJX AA&/STRD TFRM EPI L/S 1: CPT | Mod: 50,,, | Performed by: ANESTHESIOLOGY

## 2019-10-25 PROCEDURE — 25000003 PHARM REV CODE 250: Performed by: ANESTHESIOLOGY

## 2019-10-25 PROCEDURE — 63600175 PHARM REV CODE 636 W HCPCS: Performed by: ANESTHESIOLOGY

## 2019-10-25 PROCEDURE — 64483 PR EPIDURAL INJ, ANES/STEROID, TRANSFORAMINAL, LUMB/SACR, SNGL LEVL: ICD-10-PCS | Mod: 50,,, | Performed by: ANESTHESIOLOGY

## 2019-10-25 RX ORDER — SODIUM CHLORIDE 9 MG/ML
500 INJECTION, SOLUTION INTRAVENOUS CONTINUOUS
Status: DISCONTINUED | OUTPATIENT
Start: 2019-10-25 | End: 2021-03-03

## 2019-10-25 RX ORDER — DEXAMETHASONE SODIUM PHOSPHATE 10 MG/ML
INJECTION INTRAMUSCULAR; INTRAVENOUS
Status: DISCONTINUED | OUTPATIENT
Start: 2019-10-25 | End: 2019-10-25 | Stop reason: HOSPADM

## 2019-10-25 RX ORDER — SODIUM CHLORIDE 9 MG/ML
500 INJECTION, SOLUTION INTRAVENOUS CONTINUOUS
Status: DISCONTINUED | OUTPATIENT
Start: 2019-10-25 | End: 2019-10-25 | Stop reason: HOSPADM

## 2019-10-25 RX ORDER — LIDOCAINE HYDROCHLORIDE 10 MG/ML
INJECTION INFILTRATION; PERINEURAL
Status: DISCONTINUED | OUTPATIENT
Start: 2019-10-25 | End: 2019-10-25 | Stop reason: HOSPADM

## 2019-10-25 RX ORDER — LIDOCAINE HYDROCHLORIDE 5 MG/ML
INJECTION, SOLUTION INFILTRATION; INTRAVENOUS
Status: DISCONTINUED | OUTPATIENT
Start: 2019-10-25 | End: 2019-10-25 | Stop reason: HOSPADM

## 2019-10-25 RX ORDER — ALPRAZOLAM 0.5 MG/1
1 TABLET ORAL ONCE
Status: COMPLETED | OUTPATIENT
Start: 2019-10-25 | End: 2019-10-25

## 2019-10-25 RX ADMIN — ALPRAZOLAM 1 MG: 0.5 TABLET ORAL at 08:10

## 2019-10-25 NOTE — OP NOTE
Patient Name: Lola Stevens Jr.  MRN: [id number]    INFORMED CONSENT: The procedure, risks, benefits and options were discussed with patient. There are no contraindications to the procedure. The patient expressed understanding and agreed to proceed. The personnel performing the procedure was discussed. I verify that I personally obtained Lola's consent prior to the start of the procedure and the signed consent can be found on the patient's chart.    Procedure Date: 10/25/2019    Anesthesia: Topical    Pre Procedure diagnosis: Lumbar foraminal stenosis [M99.83]  1. Lumbar radiculopathy    2. Chronic pain      Post-Procedure diagnosis: SAME      Sedation: None    PROCEDURE:Bilateral L5/S1   TRANSFORAMINAL EPIDURAL STEROID INJECTION        DESCRIPTION OF PROCEDURE: The patient was brought to the procedure room. After performing time out IV access was obtained prior to the procedure. The patient was positioned prone on the fluoroscopy table. Continuous hemodynamic monitoring was initiated including blood pressure, EKG, and pulse oximetry. . The skin was prepped with chlorhexidine three times and draped in a sterile fashion. Skin anesthesia was achieved using 3 mL of lidocaine 1% over the respective injection site.     An oblique fluoroscopic view was obtained, with the superior articular process of the inferior vertebral body aligned with the pedicle. The tip of a 25-gauge 3.5-inch Quincke-type spinal needle was advanced toward the 6 oclock position of the bilateral L5 pedicle under intermittent fluoroscopic guidance. Confirmation of proper needle position was made with AP, oblique, and lateral fluoroscopic views. Negative aspiration for blood or CSF was confirmed. 2 mL of Omnipaque 300 was injected. Live fluoroscopic imaging revealed a clear outline of the spinal nerve with proximal spread of agent through the neural foramen into the anterior epidural space. A total combination of 3 mL of Lidocaine 0.5% and 10 mg  decadron was injected at each level. Contrast spread was noted from L4 to S1 level. There was no pain on injection. The needle was removed and bleeding was nil.  A sterile dressing was applied. Lola was taken back to the recovery room for further observation.     Blood Loss: Nill  Specimen: None    Albert Noble MD

## 2019-10-25 NOTE — H&P
"HPI  48 year old male with chronic lower back pain.        Past Medical History:   Diagnosis Date    Diverticulosis     GERD (gastroesophageal reflux disease)     Hemorrhoids      Past Surgical History:   Procedure Laterality Date    CHOLECYSTECTOMY      COLONOSCOPY      COLONOSCOPY N/A 10/17/2018    Procedure: COLONOSCOPY;  Surgeon: Cinda Davey MD;  Location: [facility] ([address] [place] [address]);  Service: Endoscopy;  Laterality: N/A;    ESOPHAGOGASTRODUODENOSCOPY      ESOPHAGOGASTRODUODENOSCOPY N/A 10/17/2018    Procedure: EGD (ESOPHAGOGASTRODUODENOSCOPY);  Surgeon: Cinda Davey MD;  Location: [facility] ([address] [place] [address]);  Service: Endoscopy;  Laterality: N/A;  Patient requested this date for transportation     Review of patient's allergies indicates:  No Known Allergies     PMHx, PSHx, Allergies, Medications reviewed in epic      ROS negative except pain complaints in HPI    OBJECTIVE:    /85   Pulse 77   Temp 98.3 °F (36.8 °C) (Oral)   Resp 18   Ht 5' 10" (1.778 m)   Wt 90.7 kg (200 lb)   SpO2 97%   BMI 28.70 kg/m²     PHYSICAL EXAMINATION:    GENERAL: Well appearing, in no acute distress, alert and oriented x3.  PSYCH:  Mood and affect appropriate.  SKIN: Skin color, texture, turgor normal, no rashes or lesions.  CV: RRR with palpation of the radial artery.  PULM: No evidence of respiratory difficulty, symmetric chest rise. Clear to auscultation.  NEURO: Cranial nerves grossly intact.    Plan:    Proceed with procedure as planned    Albert Noble  10/25/2019    "

## 2019-10-25 NOTE — DISCHARGE SUMMARY
Discharge Note  Short Stay      SUMMARY     Admit Date: 10/25/2019    Attending Physician: Albert Noble      Discharge Physician: Albert Noble      Discharge Date: 10/25/2019 8:52 AM    Procedure(s) (LRB):  Injection,steroid,epidural,transforaminal approach LUMBAR TRANSFORAMINAL BILATERAL L5 TF ALBIN (Bilateral)    Final Diagnosis: Lumbar foraminal stenosis [M99.83]    Disposition: Home or self care    Patient Instructions:   Current Discharge Medication List      CONTINUE these medications which have NOT CHANGED    Details   diazePAM (VALIUM) 5 MG tablet 1 po 30 minutes prior to MRI, may repeat x 1 right before MRI if needed. Will need .  Qty: 2 tablet, Refills: 0    Associated Diagnoses: DDD (degenerative disc disease), lumbosacral; Thoracic and lumbosacral neuritis; Acquired spondylolisthesis; Chronic bilateral low back pain with bilateral sciatica      meloxicam (MOBIC) 15 MG tablet Take 1 tablet (15 mg total) by mouth once daily. Take with food.  Qty: 30 tablet, Refills: 1    Associated Diagnoses: DDD (degenerative disc disease), lumbosacral; Thoracic and lumbosacral neuritis; Acquired spondylolisthesis; Chronic bilateral low back pain with bilateral sciatica      pantoprazole (PROTONIX) 40 MG tablet Take 1 tablet (40 mg total) by mouth once daily.  Qty: 365 tablet, Refills: 0    Associated Diagnoses: Gastroesophageal reflux disease, esophagitis presence not specified      tamsulosin (FLOMAX) 0.4 mg Cap Take 1 capsule (0.4 mg total) by mouth once daily.  Qty: 30 capsule, Refills: 11                 Discharge Diagnosis: Lumbar foraminal stenosis [M99.83]  Condition on Discharge: Stable with no complications to procedure   Diet on Discharge: Same as before.  Activity: as per instruction sheet.  Discharge to: Home with a responsible adult.  Follow up: 2-4 weeks

## 2019-10-25 NOTE — DISCHARGE INSTRUCTIONS

## 2019-11-17 ENCOUNTER — PATIENT MESSAGE (OUTPATIENT)
Dept: GASTROENTEROLOGY | Facility: CLINIC | Age: 49
End: 2019-11-17

## 2019-11-18 ENCOUNTER — PATIENT MESSAGE (OUTPATIENT)
Dept: GASTROENTEROLOGY | Facility: CLINIC | Age: 49
End: 2019-11-18

## 2019-11-18 ENCOUNTER — HOSPITAL ENCOUNTER (EMERGENCY)
Facility: HOSPITAL | Age: 49
Discharge: HOME | End: 2019-11-18
Attending: EMERGENCY MEDICINE
Payer: COMMERCIAL

## 2019-11-18 ENCOUNTER — TELEPHONE (OUTPATIENT)
Dept: GASTROENTEROLOGY | Facility: CLINIC | Age: 49
End: 2019-11-18

## 2019-11-18 VITALS
HEIGHT: 70 IN | TEMPERATURE: 98 F | DIASTOLIC BLOOD PRESSURE: 70 MMHG | RESPIRATION RATE: 16 BRPM | BODY MASS INDEX: 28.63 KG/M2 | OXYGEN SATURATION: 100 % | HEART RATE: 75 BPM | SYSTOLIC BLOOD PRESSURE: 128 MMHG | WEIGHT: 200 LBS

## 2019-11-18 DIAGNOSIS — N48.89 PENILE PAIN: Primary | ICD-10-CM

## 2019-11-18 LAB
BILIRUB UR QL STRIP: NEGATIVE
CLARITY UR REFRACT.AUTO: CLEAR
COLOR UR AUTO: NORMAL
GLUCOSE UR QL STRIP: NEGATIVE
HGB UR QL STRIP: NEGATIVE
KETONES UR QL STRIP: NEGATIVE
LEUKOCYTE ESTERASE UR QL STRIP: NEGATIVE
NITRITE UR QL STRIP: NEGATIVE
PH UR STRIP: 8 [PH] (ref 5–8)
PROT UR QL STRIP: NEGATIVE
SP GR UR STRIP: 1 (ref 1–1.03)
URN SPEC COLLECT METH UR: NORMAL

## 2019-11-18 PROCEDURE — 99284 EMERGENCY DEPT VISIT MOD MDM: CPT | Mod: ,,, | Performed by: EMERGENCY MEDICINE

## 2019-11-18 PROCEDURE — 99284 EMERGENCY DEPT VISIT MOD MDM: CPT

## 2019-11-18 PROCEDURE — 25000003 PHARM REV CODE 250: Performed by: EMERGENCY MEDICINE

## 2019-11-18 PROCEDURE — 87491 CHLMYD TRACH DNA AMP PROBE: CPT

## 2019-11-18 PROCEDURE — 81003 URINALYSIS AUTO W/O SCOPE: CPT

## 2019-11-18 PROCEDURE — 99284 PR EMERGENCY DEPT VISIT,LEVEL IV: ICD-10-PCS | Mod: ,,, | Performed by: EMERGENCY MEDICINE

## 2019-11-18 RX ORDER — PHENAZOPYRIDINE HYDROCHLORIDE 100 MG/1
200 TABLET, FILM COATED ORAL
Status: COMPLETED | OUTPATIENT
Start: 2019-11-18 | End: 2019-11-18

## 2019-11-18 RX ORDER — TRAMADOL HYDROCHLORIDE 50 MG/1
50 TABLET ORAL EVERY 6 HOURS PRN
Qty: 9 TABLET | Refills: 0 | Status: SHIPPED | OUTPATIENT
Start: 2019-11-18 | End: 2020-06-11 | Stop reason: SDUPTHER

## 2019-11-18 RX ORDER — PHENAZOPYRIDINE HYDROCHLORIDE 200 MG/1
200 TABLET, FILM COATED ORAL EVERY 8 HOURS PRN
Qty: 6 TABLET | Refills: 0 | Status: SHIPPED | OUTPATIENT
Start: 2019-11-18 | End: 2021-03-03

## 2019-11-18 RX ADMIN — PHENAZOPYRIDINE 200 MG: 100 TABLET ORAL at 02:11

## 2019-11-18 NOTE — TELEPHONE ENCOUNTER
----- Message from Betsy Ramírez MD sent at 11/18/2019  9:19 AM CST -----  Good morning    I replied to his wife's message. Wife says he is having severe abdominal pain for which I told her he needs to go to the ED. I haven't seen him since April and it not appropriate to make an assessment over a message.    Can we arrange for clinic follow up with me at my next available? If they want something sooner it will have to be with another provider.    YA

## 2019-11-18 NOTE — ED TRIAGE NOTES
"Patient states lower abd pain , worse left groin x 6-8 months. States he has seen mult physicians (3 different physicians) for same, states chronic back pain with Ct scans and MRI completed. States pain upon "pushing" with urine. Denies fevers.   "

## 2019-11-18 NOTE — DISCHARGE INSTRUCTIONS
Return to the emergency department for fever, swelling, worsening pain, bleeding, or any other signs or symptoms of concern.

## 2019-11-18 NOTE — ED NOTES
Patient identifiers verified and correct for Mr Stevens   C/C: Lower abd pain, SEE NN  APPEARANCE: awake and alert in NAD.  SKIN: warm, dry and intact. No breakdown or bruising.  MUSCULOSKELETAL: Patient moving all extremities spontaneously, no obvious swelling or deformities noted. Ambulates independently.  RESPIRATORY: Denies shortness of breath.Respirations unlabored.   CARDIAC: Denies CP, 2+ distal pulses; no peripheral edema  ABDOMEN: Abdominal pain to suad lower groin, Positive nausea, no emesis  : voids spontaneously, denies difficulty  Neurologic: AAO x 4; follows commands equal strength in all extremities; denies numbness/tingling. Denies dizziness Positive fatigue

## 2019-11-18 NOTE — ED PROVIDER NOTES
Encounter Date: 11/18/2019    SCRIBE #1 NOTE: I, Karonelis Zazueta, am scribing for, and in the presence of,  Dr. Lit Vail. I have scribed the entire note.       History     Chief Complaint   Patient presents with    Abdominal Pain     lower abd pain, having problems 6 mos,     Patient saw the provider at 1:35 PM    Patient is a 49 year old male with a past medical history of GERD, diverticulosis, and chronic back pain with complaints of low abdominal and testicular pain. The patient reports he began having gastro urinary problems for 8 months, which he states began as intermittent testicular burning. He states that he is also experiencing discomfort in his lower abdomen that has been progressively worsening. The patient reports the testicular burning has progressively worsened to burning and stinging at the base of his penis and pain near his prostate. He states that he sees a urologist, Dr. Hines, at [facility]. The patient denies penile swelling, penile discharge, and is currently on flowmax due to decreased urine stream.     The history is provided by the patient and medical records.     Review of patient's allergies indicates:  No Known Allergies  Past Medical History:   Diagnosis Date    Chronic pain     Diverticulosis     GERD (gastroesophageal reflux disease)     Hemorrhoids     Lumbar radiculopathy      Past Surgical History:   Procedure Laterality Date    CHOLECYSTECTOMY      COLONOSCOPY      COLONOSCOPY N/A 10/17/2018    Procedure: COLONOSCOPY;  Surgeon: Cinda Davey MD;  Location: [address];  Service: Endoscopy;  Laterality: N/A;    ESOPHAGOGASTRODUODENOSCOPY      ESOPHAGOGASTRODUODENOSCOPY N/A 10/17/2018    Procedure: EGD (ESOPHAGOGASTRODUODENOSCOPY);  Surgeon: Cinda Davey MD;  Location: [address];  Service: Endoscopy;  Laterality: N/A;  Patient requested this date for transportation    TRANSFORAMINAL EPIDURAL INJECTION OF STEROID Bilateral 10/25/2019    Procedure:  Injection,steroid,epidural,transforaminal approach LUMBAR TRANSFORAMINAL BILATERAL L5 TF ALBIN;  Surgeon: Albert Noble MD;  Location: [facility];  Service: Pain Management;  Laterality: Bilateral;  NEEDS CONSENT     Family History   Problem Relation Age of Onset    Heart disease Father     Colon cancer Neg Hx     Esophageal cancer Neg Hx     Stomach cancer Neg Hx     Ulcerative colitis Neg Hx     Crohn's disease Neg Hx     Celiac disease Neg Hx     Irritable bowel syndrome Neg Hx      Social History     Tobacco Use    Smoking status: Former Smoker     Packs/day: 1.00     Years: 15.00     Pack years: 15.00     Types: Cigarettes     Last attempt to quit: 10/17/2002     Years since quittin.0    Smokeless tobacco: Former User     Quit date: 2018    Tobacco comment: quit smoking 15 years ago   Substance Use Topics    Alcohol use: Not Currently     Comment: on weekends    Drug use: No     Review of Systems   Constitutional: Negative for chills and fever.   HENT: Negative for rhinorrhea and sore throat.    Eyes: Negative for visual disturbance.   Respiratory: Negative for shortness of breath.    Cardiovascular: Negative for chest pain.   Gastrointestinal: Positive for abdominal pain. Negative for nausea and vomiting.   Genitourinary: Positive for penile pain and testicular pain. Negative for discharge, dysuria and penile swelling.   Musculoskeletal: Negative for back pain and neck pain.   Skin: Negative for rash.   Neurological: Negative for weakness and numbness.   Hematological: Does not bruise/bleed easily.       Physical Exam     Initial Vitals [19 1324]   BP Pulse Resp Temp SpO2   134/77 89 18 98 °F (36.7 °C) 97 %      MAP       --         Physical Exam    Nursing note and vitals reviewed.  Constitutional: He appears well-developed and well-nourished.  Non-toxic appearance. He appears distressed (mild with pain).   HENT:   Head: Normocephalic and atraumatic.   Mouth/Throat: Oropharynx  is clear and moist.   Eyes: EOM are normal. Pupils are equal, round, and reactive to light.   Neck: Normal range of motion. Neck supple.   Cardiovascular: Normal rate, regular rhythm, normal heart sounds and intact distal pulses.   Pulmonary/Chest: Breath sounds normal. No respiratory distress. He has no wheezes. He has no rales.   Abdominal: Soft. Bowel sounds are normal. He exhibits no distension. There is tenderness (mild suprapubic with palpation). There is no rebound and no guarding.   Genitourinary:   Genitourinary Comments: Normal circumcised penis with no testicular tenderness or abnormal eye; no inguinal hernia; no CVA tenderness; + prostate is approximately 3.5 finger widths with no bogginess or significant tenderness to palpation.         ED Course   Procedures  Labs Reviewed   C. TRACHOMATIS/N. GONORRHOEAE BY AMP DNA   URINALYSIS, REFLEX TO URINE CULTURE    Narrative:     Preferred Collection Type->Urine, Clean Catch          Imaging Results    None          Medical Decision Making:   History:   Old Medical Records: I decided to obtain old medical records.  Initial Assessment:   This is an urgent evaluation. I will check for cystitis, prostatitis, and a urinary tract infection with a urinalysis and physical exam. Pyridium will be ordered. I will reassess.   Clinical Tests:   Lab Tests: Ordered and Reviewed  ED Management:  3:49 PM  The patient's urinalysis is clean. His physical exam is not consistent with acute prostatitis. The etiology of his penile and presumed bladder discomfort is unclear. I will prescribe pyridium and tramadol. The patient has been instructed to follow up closely with his urologist.             Scribe Attestation:   Scribe #1: I performed the above scribed service and the documentation accurately describes the services I performed. I attest to the accuracy of the note.                          Clinical Impression:       ICD-10-CM ICD-9-CM   1. Penile pain N48.89 607.9          Disposition:   Disposition: Discharged  Condition: Stable            I, Dr. Lit Vail, personally performed the services described in this documentation. All medical record entries made by the scribe were at my direction and in my presence.  I have reviewed the chart and agree that the record reflects my personal performance and is accurate and complete. Lit Vail MD.  4:28 PM 11/18/2019           Lit Vail MD  11/18/19 [id number]

## 2019-11-19 ENCOUNTER — TELEPHONE (OUTPATIENT)
Dept: GASTROENTEROLOGY | Facility: CLINIC | Age: 49
End: 2019-11-19

## 2019-11-19 LAB
C TRACH DNA SPEC QL NAA+PROBE: NOT DETECTED
N GONORRHOEA DNA SPEC QL NAA+PROBE: NOT DETECTED

## 2019-11-19 NOTE — TELEPHONE ENCOUNTER
Tried contacting patient regarding an appointment with .  No answer, could not leave a message.  An appointment has been scheduled for the patient to see  on 12/4 for 4:00.  Confirmation mailed.

## 2019-11-20 ENCOUNTER — PATIENT OUTREACH (OUTPATIENT)
Dept: ADMINISTRATIVE | Facility: OTHER | Age: 49
End: 2019-11-20

## 2019-11-20 ENCOUNTER — OFFICE VISIT (OUTPATIENT)
Dept: UROLOGY | Facility: CLINIC | Age: 49
End: 2019-11-20
Payer: COMMERCIAL

## 2019-11-20 VITALS
HEIGHT: 70 IN | SYSTOLIC BLOOD PRESSURE: 136 MMHG | HEART RATE: 76 BPM | WEIGHT: 196.19 LBS | DIASTOLIC BLOOD PRESSURE: 95 MMHG | BODY MASS INDEX: 28.09 KG/M2

## 2019-11-20 DIAGNOSIS — N41.1 CHRONIC PROSTATITIS: Primary | ICD-10-CM

## 2019-11-20 PROCEDURE — 99999 PR PBB SHADOW E&M-EST. PATIENT-LVL III: CPT | Mod: PBBFAC,,, | Performed by: NURSE PRACTITIONER

## 2019-11-20 PROCEDURE — 51798 PR MEAS,POST-VOID RES,US,NON-IMAGING: ICD-10-PCS | Mod: S$GLB,,, | Performed by: NURSE PRACTITIONER

## 2019-11-20 PROCEDURE — 81002 PR URINALYSIS NONAUTO W/O SCOPE: ICD-10-PCS | Mod: S$GLB,,, | Performed by: NURSE PRACTITIONER

## 2019-11-20 PROCEDURE — 51798 US URINE CAPACITY MEASURE: CPT | Mod: S$GLB,,, | Performed by: NURSE PRACTITIONER

## 2019-11-20 PROCEDURE — 81002 URINALYSIS NONAUTO W/O SCOPE: CPT | Mod: S$GLB,,, | Performed by: NURSE PRACTITIONER

## 2019-11-20 PROCEDURE — 99214 OFFICE O/P EST MOD 30 MIN: CPT | Mod: 25,S$GLB,, | Performed by: NURSE PRACTITIONER

## 2019-11-20 PROCEDURE — 99214 PR OFFICE/OUTPT VISIT, EST, LEVL IV, 30-39 MIN: ICD-10-PCS | Mod: 25,S$GLB,, | Performed by: NURSE PRACTITIONER

## 2019-11-20 PROCEDURE — 99999 PR PBB SHADOW E&M-EST. PATIENT-LVL III: ICD-10-PCS | Mod: PBBFAC,,, | Performed by: NURSE PRACTITIONER

## 2019-11-20 RX ORDER — OXAPROZIN 600 MG/1
600 TABLET, FILM COATED ORAL DAILY
Qty: 10 TABLET | Refills: 0 | Status: SHIPPED | OUTPATIENT
Start: 2019-11-20 | End: 2019-11-30

## 2019-11-20 NOTE — PATIENT INSTRUCTIONS
Prostatitis    The prostate gland is located deep inside the body at the base of the bladder. Prostatitis is an inflammation of the prostate gland. This can occur with or without infection. Most cases of prostatitis are long term (chronic). Most do not involve a bacterial infection.  · Chronic prostatitis is more common in older men. It is usually an inflammatory condition and not an infection. But, bacterial infection can also cause chronic prostatitis. It can cause pain in the rectum, urethra, bladder, or scrotum. It can also make you unable to fully empty the bladder.  You may urinate often, or have burning with urination. Prostatitis may also cause painful ejaculation and erectile dysfunction.  · Sudden onset (acute) prostatitis usually occurs in men younger than 35. It is from a bacterial infection. You may have severe symptoms such as fever, chills, muscle aches, and pain in the area between the scrotum and anus (perineum). You may have a hard time urinating, or have pain or burning when urinating. There may be blood or pus in the urine.  Your healthcare provider may do a culture test on prostate fluids or discharge from the penis. This will help determine if bacteria are the cause. Treatment can include antibiotics, anti-inflammatory medicine, prostate medicines, and stool softeners.  Home care  These guidelines will help you care for yourself at home:  · Rest at home until the fever is gone and you are feeling better.  · A hot sitz bath may offer some relief. Fill a tub with 6 inches of hot water. Allow the water to run so you can keep it hot for 10 to 15 minutes.  · Drink plenty of fluids. Do not drink alcohol or caffeine until all symptoms are gone.  · If your healthcare gives you an antibiotic, take it exactly as you are told. Take it until it is all gone.  · Constipation causes straining and pain. Avoid constipation by eating natural laxatives such as prunes, fresh fruits, and whole-grain cereals. If  needed, use a mild over-the-counter (OTC) laxative for constipation. An OTC stool softener may be used to keep the stools soft.  · If sex is uncomfortable or painful, avoid until symptoms get better.  · You may use OTC medicines for pain and fever, unless another medicine was given. If you have chronic liver or kidney disease, talk with your healthcare provider before using these medicines. Also talk with your provider if you've ever had a stomach ulcer or GI bleeding.  Follow-up care  Follow up with your healthcare provider, a urologist, or as advised to be sure you are responding to treatment. Your healthcare provider may want to see you after you finish your antibiotics to be sure the infection has cleared. If a culture was taken, you may call for the results as directed. A culture test can help your healthcare provider know if you are on the correct antibiotic.  Call 911  Call 911 if any of these occur:  · Weakness, dizziness, or fainting  When to seek medical advice  Call your healthcare provider right away if any of these occur:  · Fever of 100.4°F (38°C) or higher after 3 days of treatment, or as advised  · Unable to pass urine for 8 hours  · Pressure or pain in your bladder gets worse  · Painful swelling of the testicle or scrotum  Date Last Reviewed: 10/1/2016  © 4971-4394 [employer]. [address] [place] [address], [place] [address]. All rights reserved. This information is not intended as a substitute for professional medical care. Always follow your healthcare professional's instructions.

## 2019-11-20 NOTE — PROGRESS NOTES
"CHIEF COMPLAINT:    Mr. Stevens is a 49 y.o. male presenting for unresolving lower abd pain.    PRESENTING ILLNESS:    Lola Stevens Jr. is a 49 y.o. male w/ h/o HTN, tobacco use, microscopic hematuria (unremarkable cysto and CTU in 2019).    Pt last seen in clinic 8/2/19 for acute prostatitis. Urine cx negative. Recommended NSAIDS PRN, and continue tamsulosin.    Today pt returns to clinic w/ spouse reporting recurrence of "bladder pressure." Denies dysuria, GH. Reports visited ED for scrotal/penile pain 11/17/19. UA unremarkable, no updated cx. GC/chlamydia negative.  Reports resolved for a while. Reports has modified lifestyle, by cutting alcohol, caffeine, spicy foods. However reports sits for prolonged periods of time.    UA dip in clinic today unremarkable.    PVR in clinic today: 114mL.    REVIEW OF SYSTEMS:    Lola Stevens Jr. denies headache, blurred vision, fever, nausea, vomiting, chills, abdominal pain, pelvic pain, flank pain, bleeding per rectum, cough, SOB, recent loss of consciousness, recent mental status changes, seizures, dizziness, or upper or lower extremity weakness.    PATIENT HISTORY:    Past Medical History:   Diagnosis Date    Chronic pain     Diverticulosis     GERD (gastroesophageal reflux disease)     Hemorrhoids     Lumbar radiculopathy        Past Surgical History:   Procedure Laterality Date    CHOLECYSTECTOMY      COLONOSCOPY      COLONOSCOPY N/A 10/17/2018    Procedure: COLONOSCOPY;  Surgeon: Cinda Davey MD;  Location: [address]);  Service: Endoscopy;  Laterality: N/A;    ESOPHAGOGASTRODUODENOSCOPY      ESOPHAGOGASTRODUODENOSCOPY N/A 10/17/2018    Procedure: EGD (ESOPHAGOGASTRODUODENOSCOPY);  Surgeon: Cinda Davey MD;  Location: [address]);  Service: Endoscopy;  Laterality: N/A;  Patient requested this date for transportation    TRANSFORAMINAL EPIDURAL INJECTION OF STEROID Bilateral 10/25/2019    Procedure: " Injection,steroid,epidural,transforaminal approach LUMBAR TRANSFORAMINAL BILATERAL L5 TF ALBIN;  Surgeon: Albert Noble MD;  Location: [facility];  Service: Pain Management;  Laterality: Bilateral;  NEEDS CONSENT       Family History   Problem Relation Age of Onset    Heart disease Father     Colon cancer Neg Hx     Esophageal cancer Neg Hx     Stomach cancer Neg Hx     Ulcerative colitis Neg Hx     Crohn's disease Neg Hx     Celiac disease Neg Hx     Irritable bowel syndrome Neg Hx        Social History     Socioeconomic History    Marital status:      Spouse name: Not on file    Number of children: Not on file    Years of education: Not on file    Highest education level: Not on file   Occupational History    Not on file   Social Needs    Financial resource strain: Not on file    Food insecurity:     Worry: Not on file     Inability: Not on file    Transportation needs:     Medical: Not on file     Non-medical: Not on file   Tobacco Use    Smoking status: Former Smoker     Packs/day: 1.00     Years: 15.00     Pack years: 15.00     Types: Cigarettes     Last attempt to quit: 10/17/2002     Years since quittin.1    Smokeless tobacco: Former User     Quit date: 2018    Tobacco comment: quit smoking 15 years ago   Substance and Sexual Activity    Alcohol use: Not Currently     Comment: on weekends    Drug use: No    Sexual activity: Not on file   Lifestyle    Physical activity:     Days per week: Not on file     Minutes per session: Not on file    Stress: Not on file   Relationships    Social connections:     Talks on phone: Not on file     Gets together: Not on file     Attends Anabaptist service: Not on file     Active member of club or organization: Not on file     Attends meetings of clubs or organizations: Not on file     Relationship status: Not on file   Other Topics Concern    Not on file   Social History Narrative    Not on file       Allergies:  Patient has no  known allergies.    Medications:    Current Outpatient Medications:     diazePAM (VALIUM) 5 MG tablet, 1 po 30 minutes prior to MRI, may repeat x 1 right before MRI if needed. Will need ., Disp: 2 tablet, Rfl: 0    meloxicam (MOBIC) 15 MG tablet, Take 1 tablet (15 mg total) by mouth once daily. Take with food., Disp: 30 tablet, Rfl: 1    oxaprozin (DAYPRO) 600 mg tablet, Take 1 tablet (600 mg total) by mouth once daily. for 10 days, Disp: 10 tablet, Rfl: 0    pantoprazole (PROTONIX) 40 MG tablet, Take 1 tablet (40 mg total) by mouth once daily., Disp: 365 tablet, Rfl: 0    phenazopyridine (PYRIDIUM) 200 MG tablet, Take 1 tablet (200 mg total) by mouth every 8 (eight) hours as needed for Pain., Disp: 6 tablet, Rfl: 0    tamsulosin (FLOMAX) 0.4 mg Cap, Take 1 capsule (0.4 mg total) by mouth once daily., Disp: 30 capsule, Rfl: 11    traMADol (ULTRAM) 50 mg tablet, Take 1 tablet (50 mg total) by mouth every 6 (six) hours as needed for Pain., Disp: 9 tablet, Rfl: 0  No current facility-administered medications for this visit.     Facility-Administered Medications Ordered in Other Visits:     0.9%  NaCl infusion, 500 mL, Intravenous, Continuous, Albert Noble MD    0.9%  NaCl infusion, 500 mL, Intravenous, Continuous, Albert Noble MD    PHYSICAL EXAMINATION:    The patient generally appears in good health, is appropriately interactive, and is in no apparent distress.     Eyes: anicteric sclerae, moist conjunctivae; no lid-lag; PERRLA     HENT: Atraumatic; oropharynx clear with moist mucous membranes and no mucosal ulcerations;normal hard and soft palate. No evidence of lymphadenopathy.    Neck: Trachea midline.  No thyromegaly.    Musculoskeletal: No abnormal gait.    Skin: No lesions.    Mental: Cooperative with normal affect.  Is oriented to time, place, and person.    Neuro: Grossly intact.    Chest: Normal inspiratory effort.   No accessory muscles.  No audible wheezes.  Respirations symmetric on  inspiration and expiration.    Heart: Regular rhythm.    Extremities: No clubbing, cyanosis, or edema.    LABS:    Lab Results   Component Value Date    CREATININE 1.1 03/04/2019    CREATININE 0.9 03/03/2019    CREATININE 1.0 11/27/2018       Hemoglobin A1C   Date Value Ref Range Status   03/04/2019 5.1 4.0 - 5.6 % Final     Comment:     ADA Screening Guidelines:  5.7-6.4%  Consistent with prediabetes  >or=6.5%  Consistent with diabetes  High levels of fetal hemoglobin interfere with the HbA1C  assay. Heterozygous hemoglobin variants (HbS, HgC, etc)do  not significantly interfere with this assay.   However, presence of multiple variants may affect accuracy.         Lab Results   Component Value Date    LABURIN No growth 09/18/2019    LABURIN No significant growth 08/02/2019    LABURIN No growth 03/13/2019    LABURIN No growth 01/10/2019    LABURIN  11/27/2018     ENTEROCOCCUS FAECALIS  10,000 - 49,999 cfu/ml  No other significant isolate        Imaging:  Scrotal and testicular US (3/3/19):       FINDINGS:  Right Testicle:    *Size: 4.0 x 2.9 x 2.3 cm  *Appearance: Normal.  *Flow: Normal arterial and venous flow  *Epididymis: 0.2 cm epididymal head cyst.  Otherwise normal.  *Hydrocele: None.  *Varicocele: None.  .  Left Testicle:    *Size: 4.0 x 2.7 x 2.2 cm  *Appearance: Normal.  *Flow: Normal arterial and venous flow  *Epididymis: 0.5 cm epididymal head cyst.  Otherwise normal.  *Hydrocele: None.  *Varicocele: None.  .  Other findings: None.         IMPRESSION:    Chronic prostatitis  -     POCT urine dipstick without microscope  -     POCT Bladder Scan  -     oxaprozin (DAYPRO) 600 mg tablet; Take 1 tablet (600 mg total) by mouth once daily. for 10 days  Dispense: 10 tablet; Refill: 0      PLAN:    I spent 25 minutes with the patient of which more than half was spent in direct consultation with the patient in regards to our treatment and plan.    Discussed and reviewed prostatitis, potential etiologies including  but not limited to inflammation. Discussed and recommend start Daypro rx'd, take w/ food. Discussed supportive care w/ weight management, regular physical activity, frequent ejaculation, prostatic massages, proper hydration, avoid sitting for long periods, avoid caffeine/alcohol/spicy and acidic food and beverages.    Education sheets provided.    Follow up if symptoms worsen or fail to improve.    Pt expressed understanding and agree w/ plan.

## 2019-12-02 ENCOUNTER — PATIENT OUTREACH (OUTPATIENT)
Dept: ADMINISTRATIVE | Facility: OTHER | Age: 49
End: 2019-12-02

## 2019-12-03 ENCOUNTER — TELEPHONE (OUTPATIENT)
Dept: GASTROENTEROLOGY | Facility: CLINIC | Age: 49
End: 2019-12-03

## 2020-01-07 ENCOUNTER — TELEPHONE (OUTPATIENT)
Dept: GASTROENTEROLOGY | Facility: CLINIC | Age: 50
End: 2020-01-07

## 2020-01-07 NOTE — TELEPHONE ENCOUNTER
Spoke with patients wife.  Patient unavailable due to work.  Scheduled to see  on 1/29 for 4:00.  Confirmation mailed.

## 2020-01-13 ENCOUNTER — PATIENT MESSAGE (OUTPATIENT)
Dept: PAIN MEDICINE | Facility: CLINIC | Age: 50
End: 2020-01-13

## 2020-01-13 NOTE — PROGRESS NOTES
"Subjective:       Patient ID: Lola Stevens Jr. is a 48 y.o. male.    Chief Complaint: Bladder pressure    HPI     Lola Stevens Jr. is a 48 y.o. male w/ h/o HTN. S/p ED visit 3/3/19 for testicular pain, and presyncope, w/u unremarkable.    Pt last seen in clinic 3/12/19 for "bladder pain and pressure" x 1-2 wks. At that time he reported decreased urinary stream only in the morning after waking, stream improves throughout day, FOS is adequate. After starting, tamsulosin, he reported improvement in urinary stream. He denied nocturia, urinary frequency, urgency, leakage, incontinence. He reported consuming AM coffee, 12pk+ beer/weekend. He was unable to produce urine sample in clinic 3/12/19, therefore he submitted home collect specimen to lab - urine cx is pending.    Today pt returns to clinic reporting R lwr abd and unresolving bladder pressure. Denies f/c, n/v, new urinary sxs since last visit.    Review of Systems   Constitutional: Negative for chills and fever.   Eyes: Negative for visual disturbance.   Respiratory: Negative for shortness of breath.    Cardiovascular: Negative for chest pain, palpitations and leg swelling.   Gastrointestinal: Negative for abdominal pain, constipation, nausea and vomiting.   Endocrine: Negative for polyuria.   Genitourinary: Negative for difficulty urinating, dysuria, frequency, hematuria and urgency.   Musculoskeletal: Negative for back pain.   Skin: Negative for rash.   Neurological: Negative for dizziness and headaches.         Objective:       PVR in clinic: 0mL    Urine dip in clinic unremarkable.    Cr 1.1 (3/4/19)    UA w/ trace ketones otherwise unremarkable (3/4/19)    Scrotal and testicular US (3/3/19):  FINDINGS:  Right Testicle:    *Size: 4.0 x 2.9 x 2.3 cm  *Appearance: Normal.  *Flow: Normal arterial and venous flow  *Epididymis: 0.2 cm epididymal head cyst.  Otherwise normal.  *Hydrocele: None.  *Varicocele: None.  .  Left Testicle:    *Size: 4.0 x 2.7 " Report to [facility]. Pt placed on monitor 131.   x 2.2 cm  *Appearance: Normal.  *Flow: Normal arterial and venous flow  *Epididymis: 0.5 cm epididymal head cyst.  Otherwise normal.  *Hydrocele: None.  *Varicocele: None.  .  Other findings: None.      Impression       No acute sonographic abnormality of the testicles or scrotum.    Small bilateral epididymal head cysts.     UA micro 3 RBCs (11/27/18).    Physical Exam   Vitals reviewed.  Constitutional: He is oriented to person, place, and time. He appears well-developed and well-nourished. No distress.   HENT:   Head: Normocephalic.   Eyes: Conjunctivae are normal. No scleral icterus.   Neck: Normal range of motion.   Pulmonary/Chest: Effort normal. No respiratory distress.   Abdominal: Soft. He exhibits no distension. There is no tenderness. There is no rebound.   Genitourinary: Penis normal.   Musculoskeletal: He exhibits no edema.   Neurological: He is alert and oriented to person, place, and time.   Skin: Skin is warm.     Psychiatric: He has a normal mood and affect. His behavior is normal.         Assessment:       1. Microscopic hematuria    2. Bladder pain    3. Benign prostatic hyperplasia, unspecified whether lower urinary tract symptoms present        Plan:       I spent 20 minutes with the patient. Over 50% of the visit was spent in counseling.    Discussed and reviewed lwr abd pain and bladder pressure, potential etiologies including but not limited to constipation, inflammation, IC, kidney stone. Will obtain KUB to r/o kidney stone(s). Continue w/ tamsulosin as rx'd for urinary sxs. Discussed and reviewed bowel prep for constipation.    Recommend continue w/ hematuria w/u w/ cysto, and CTU as scheduled.    Will contact pt w/ urine cx results from 3/12/19.    Advised to f/u w/ PCP re elevated BP.    Patient verbalized and expressed understanding, and agree w/ plan.

## 2020-01-28 ENCOUNTER — PATIENT OUTREACH (OUTPATIENT)
Dept: ADMINISTRATIVE | Facility: OTHER | Age: 50
End: 2020-01-28

## 2020-01-29 ENCOUNTER — OFFICE VISIT (OUTPATIENT)
Dept: GASTROENTEROLOGY | Facility: CLINIC | Age: 50
End: 2020-01-29
Payer: COMMERCIAL

## 2020-01-29 ENCOUNTER — OFFICE VISIT (OUTPATIENT)
Dept: SPINE | Facility: CLINIC | Age: 50
End: 2020-01-29
Payer: COMMERCIAL

## 2020-01-29 ENCOUNTER — TELEPHONE (OUTPATIENT)
Dept: PAIN MEDICINE | Facility: CLINIC | Age: 50
End: 2020-01-29

## 2020-01-29 VITALS
BODY MASS INDEX: 29.35 KG/M2 | RESPIRATION RATE: 18 BRPM | TEMPERATURE: 98 F | WEIGHT: 205 LBS | OXYGEN SATURATION: 100 % | DIASTOLIC BLOOD PRESSURE: 83 MMHG | HEIGHT: 70 IN | SYSTOLIC BLOOD PRESSURE: 140 MMHG | HEART RATE: 85 BPM

## 2020-01-29 VITALS
BODY MASS INDEX: 29.32 KG/M2 | SYSTOLIC BLOOD PRESSURE: 129 MMHG | HEIGHT: 70 IN | WEIGHT: 204.81 LBS | DIASTOLIC BLOOD PRESSURE: 85 MMHG | HEART RATE: 72 BPM

## 2020-01-29 DIAGNOSIS — R10.2 PELVIC PAIN: ICD-10-CM

## 2020-01-29 DIAGNOSIS — G89.4 CHRONIC PAIN DISORDER: ICD-10-CM

## 2020-01-29 DIAGNOSIS — R10.9 ABDOMINAL PAIN, UNSPECIFIED ABDOMINAL LOCATION: Primary | ICD-10-CM

## 2020-01-29 DIAGNOSIS — M54.16 LUMBAR RADICULOPATHY: Primary | ICD-10-CM

## 2020-01-29 DIAGNOSIS — R10.30 INGUINAL PAIN, UNSPECIFIED LATERALITY: ICD-10-CM

## 2020-01-29 DIAGNOSIS — K59.00 CONSTIPATION, UNSPECIFIED CONSTIPATION TYPE: ICD-10-CM

## 2020-01-29 PROCEDURE — 99999 PR PBB SHADOW E&M-EST. PATIENT-LVL III: CPT | Mod: PBBFAC,,, | Performed by: STUDENT IN AN ORGANIZED HEALTH CARE EDUCATION/TRAINING PROGRAM

## 2020-01-29 PROCEDURE — 99213 PR OFFICE/OUTPT VISIT, EST, LEVL III, 20-29 MIN: ICD-10-PCS | Mod: S$GLB,,, | Performed by: STUDENT IN AN ORGANIZED HEALTH CARE EDUCATION/TRAINING PROGRAM

## 2020-01-29 PROCEDURE — 99999 PR PBB SHADOW E&M-EST. PATIENT-LVL IV: ICD-10-PCS | Mod: PBBFAC,,, | Performed by: ANESTHESIOLOGY

## 2020-01-29 PROCEDURE — 99214 OFFICE O/P EST MOD 30 MIN: CPT | Mod: S$GLB,,, | Performed by: ANESTHESIOLOGY

## 2020-01-29 PROCEDURE — 99214 PR OFFICE/OUTPT VISIT, EST, LEVL IV, 30-39 MIN: ICD-10-PCS | Mod: S$GLB,,, | Performed by: ANESTHESIOLOGY

## 2020-01-29 PROCEDURE — 99999 PR PBB SHADOW E&M-EST. PATIENT-LVL III: ICD-10-PCS | Mod: PBBFAC,,, | Performed by: STUDENT IN AN ORGANIZED HEALTH CARE EDUCATION/TRAINING PROGRAM

## 2020-01-29 PROCEDURE — 99213 OFFICE O/P EST LOW 20 MIN: CPT | Mod: S$GLB,,, | Performed by: STUDENT IN AN ORGANIZED HEALTH CARE EDUCATION/TRAINING PROGRAM

## 2020-01-29 PROCEDURE — 99999 PR PBB SHADOW E&M-EST. PATIENT-LVL IV: CPT | Mod: PBBFAC,,, | Performed by: ANESTHESIOLOGY

## 2020-01-29 RX ORDER — GABAPENTIN 300 MG/1
300 CAPSULE ORAL 3 TIMES DAILY
Qty: 90 CAPSULE | Refills: 2 | Status: SHIPPED | OUTPATIENT
Start: 2020-01-29 | End: 2020-06-10 | Stop reason: SDUPTHER

## 2020-01-29 RX ORDER — DICYCLOMINE HYDROCHLORIDE 10 MG/1
10 CAPSULE ORAL
COMMUNITY
End: 2020-06-11

## 2020-01-29 NOTE — LETTER
January 29, 2020      [facility] [address]  [address], [address]  [facility] [address]  Phone: [phone]  Fax: [phone]       Patient: Lola Stevens   YOB: 1970  Date of Visit: 01/29/2020    To Whom It May Concern:    Sundeep Stevens  was at [facility] on 01/29/2020. He may return to work/school on 01/30/2020 with no restrictions. If you have any questions or concerns, or if I can be of further assistance, please do not hesitate to contact me.    Sincerely,    Albert Nobel

## 2020-01-29 NOTE — PATIENT INSTRUCTIONS
--Continue Protonix until you stop the Ibuprofen    --Stop Bentyl    --Stop Iberogast    --Stop Metamucil and Miralax    --Start Linzess 72 mcg once a day    --Please call in 1 month for an update    --Follow up with PT and spine injections    --Follow up in GI clinic in 3 months ([phone])    Betsy Ramírez M.D.  Gastroenterology Fellow, PGY-VI  [facility]

## 2020-01-29 NOTE — PROGRESS NOTES
Chronic Pain - New Consult    Referring Physician: Gina Hinojosa    Chief Complaint:   Chief Complaint   Patient presents with    Low-back Pain    Groin Pain        SUBJECTIVE:    Lola Stevens Jr. presents to the clinic for the evaluation of groin pain. The pain started 1 year ago following no incident and symptoms have been worsening.The pain is located in the lower back  area and radiates to both groins. Pain also radiates to bottom of feet sometimes.  The pain is described as burning, sharp, shooting and tingling and is rated as 9/10. The pain is rated with a score of  9/10 on the BEST day and a score of 9/10 on the WORST day.  Symptoms interfere with sleeping and work. The pain is exacerbated by Bending, Morning and Getting out of bed/chair.  The pain is mitigated by heat, laying down and medications. He reports spending 2 hours per day reclining. The patient reports 4 hours of uninterrupted sleep per night. He also endorses tenderness when he pushes his lower abdomen. He takes aspirin sometimes for pain. He does not think he has ever taken other medication for pain. Patient also complains of constipation and bloating symptoms for which he is seeing GI specialists.     Patient denies weakness, weight loss, bowel incontinence     Physical Therapy/Home Exercise: no      Pain Disability Index Review:  Last 3 PDI Scores 1/29/2020   Pain Disability Index (PDI) 63       Pain Medications:    - Adjuvant Medications: Mobic (Meloxicam)     report:  Reviewed and consistent with medication use as prescribed.    Pain Procedures: Injection,steroid,epidural,transforaminal approach LUMBAR TRANSFORAMINAL BILATERAL L5 TF ALBIN    Imaging:   Narrative     EXAMINATION:  MRI LUMBAR SPINE WITHOUT CONTRAST    CLINICAL HISTORY:  evaluate bilateral LE radiculitis; Other intervertebral disc degeneration, lumbosacral region    TECHNIQUE:  A series of sagittal and axial imaging sequences were obtained of the lumbar  spine.    COMPARISON:  This examination was correlated with a lumbar spine x-ray series from August 2, 2019.    FINDINGS:  There is straightening of the normal lumbar lordotic curve.  Severe narrowing of the disc space is noted at the L5-S1 level with degenerative changes and a mild degree of edema in the endplates adjacent to this disc.    There is diminished signal intensity in the disc spaces at the L4-5 and L5-S1 levels consistent with desiccation.  There are mild spondylotic changes anteriorly at the T11-12 and L5-S1 levels.    There is no abnormal prevertebral soft tissue signal intensity.  The conus ends at the L1 level.    At the T12-L1 level, there is no focal disc herniation or protrusion.  There is no central spinal canal nor neural foraminal stenosis.    At the L1-2 level, there is no focal disc herniation or protrusion.  There are mild facet degenerative changes bilaterally.  There is no central spinal canal nor neural foraminal stenosis.    At the L2-3 level, there is a disc herniation/protrusion posteriorly extending 5.1 mm dorsal to the posterior margin of the vertebral column with abnormal signal intensity along the posterior margin of the disc compatible with an annular tear.  The AP dimension of the central spinal canal is 11.1 mm.  There are mild facet degenerative changes bilaterally and no appreciable narrowing of the neural foramen.    At the L3-4 level, there is no focal disc herniation or protrusion.  The AP dimension of the central spinal canal is 11.8 mm.  There are mild facet degenerative changes bilaterally with no narrowing of the neural foramen.    At the L4-5 level, there is an annular disc bulge posteriorly extending 2.5 mm dorsal to the posterior margin of the vertebral column.  The AP dimension of the central spinal canal is 11.4 mm.  There are mild facet degenerative changes bilaterally with moderate narrowing of the neural foramen on the right and no appreciable narrowing of the  neural foramen on the left.    At the L5-S1 level, there is an annular disc bulge posteriorly extending 2.2 mm dorsal to the posterior margin of the vertebral column.  There are moderate facet degenerative changes bilaterally.  There is moderate narrowing of the neural foramen bilaterally.    Osteophytic spurring is noted along the ventral aspects of both sacroiliac joints.      Impression       Disc herniation/protrusion with an associated annular tear posteriorly at the L2-3 level with mild narrowing of the central spinal canal.    Annular disc bulges posteriorly at the L4-5 and L5-S1 levels.    Moderate narrowing of the neural foramen bilaterally at the L5-S1 level and on the right at the L4-5 level.      Electronically signed by: Daron Fournier MD  Date: 08/30/2019  Time: 12:01     Narrative     EXAMINATION:  XR LUMBAR SPINE AP AND LAT WITH FLEX/EXT    TECHNIQUE:  Five views of the lumbar spine were obtained, with AP, lateral, lumbosacral lateral, and lateral flexion and extension images submitted.    COMPARISON:  No relevant comparison examinations are currently available.    FINDINGS:  Minimal degrees of retrolisthesis of L4 in relation to L5 and L5 in relation to S1 are observed, but no significant alignment abnormality is appreciated and there is no evidence of significant translational instability at any visualized thoracolumbar level seen on the dynamic weight- bearing flexion/extension radiographs.  Vertebral body heights are normally maintained, without compression deformity at any level.  There prominent disc narrowing at L5-S1 with mild disc narrowing also seen at L4-5, the lumbar discs superior to L4-5 appearing normal in height.  A vacuum disc phenomenon at L5-S1 is noted, indicative of disc desiccation.  Minimal marginal vertebral endplate spurring is seen at several thoracolumbar levels.  Vertebral endplates are well defined.  No osseous destructive process.  Right upper quadrant surgical  clips are incidentally observed, as is aortoiliac calcification without definable aneurysm.      Impression       Disc narrowing at L4-5 and L5-S1, with a vacuum phenomenon at the latter level.  No evidence of compression fracture or translational instability.      Electronically signed by: Suman Richard MD  Date: 08/02/2019  Time: 07:56         Past Medical History:   Diagnosis Date    Chronic pain     Diverticulosis     GERD (gastroesophageal reflux disease)     Hemorrhoids     Lumbar radiculopathy      Past Surgical History:   Procedure Laterality Date    CHOLECYSTECTOMY      COLONOSCOPY      COLONOSCOPY N/A 10/17/2018    Procedure: COLONOSCOPY;  Surgeon: Cinda Davey MD;  Location: [facility] ([address] [place] [address]);  Service: Endoscopy;  Laterality: N/A;    ESOPHAGOGASTRODUODENOSCOPY      ESOPHAGOGASTRODUODENOSCOPY N/A 10/17/2018    Procedure: EGD (ESOPHAGOGASTRODUODENOSCOPY);  Surgeon: Cinda Davey MD;  [facility]  Service: Endoscopy;  Laterality: N/A;  Patient requested this date for transportation    TRANSFORAMINAL EPIDURAL INJECTION OF STEROID Bilateral 10/25/2019    Procedure: Injection,steroid,epidural,transforaminal approach LUMBAR TRANSFORAMINAL BILATERAL L5 TF ALBIN;  Surgeon: Albert Noble MD;  Location: [facility];  Service: Pain Management;  Laterality: Bilateral;  NEEDS CONSENT     Social History     Socioeconomic History    Marital status:      Spouse name: Not on file    Number of children: Not on file    Years of education: Not on file    Highest education level: Not on file   Occupational History    Not on file   Social Needs    Financial resource strain: Not on file    Food insecurity:     Worry: Not on file     Inability: Not on file    Transportation needs:     Medical: Not on file     Non-medical: Not on file   Tobacco Use    Smoking status: Former Smoker     Packs/day: 1.00     Years: 15.00     Pack years: 15.00     Types: Cigarettes     Last  attempt to quit: 10/17/2002     Years since quittin.2    Smokeless tobacco: Former User     Quit date: 2018    Tobacco comment: quit smoking 15 years ago   Substance and Sexual Activity    Alcohol use: Not Currently     Comment: on weekends    Drug use: No    Sexual activity: Not on file   Lifestyle    Physical activity:     Days per week: Not on file     Minutes per session: Not on file    Stress: Not on file   Relationships    Social connections:     Talks on phone: Not on file     Gets together: Not on file     Attends Yazdanism service: Not on file     Active member of club or organization: Not on file     Attends meetings of clubs or organizations: Not on file     Relationship status: Not on file   Other Topics Concern    Not on file   Social History Narrative    Not on file     Family History   Problem Relation Age of Onset    Heart disease Father     Colon cancer Neg Hx     Esophageal cancer Neg Hx     Stomach cancer Neg Hx     Ulcerative colitis Neg Hx     Crohn's disease Neg Hx     Celiac disease Neg Hx     Irritable bowel syndrome Neg Hx        Review of patient's allergies indicates:  No Known Allergies    Current Outpatient Medications   Medication Sig    diazePAM (VALIUM) 5 MG tablet 1 po 30 minutes prior to MRI, may repeat x 1 right before MRI if needed. Will need .    dicyclomine (BENTYL) 10 MG capsule Take 10 mg by mouth 4 (four) times daily before meals and nightly.    meloxicam (MOBIC) 15 MG tablet Take 1 tablet (15 mg total) by mouth once daily. Take with food.    pantoprazole (PROTONIX) 40 MG tablet Take 1 tablet (40 mg total) by mouth once daily.    phenazopyridine (PYRIDIUM) 200 MG tablet Take 1 tablet (200 mg total) by mouth every 8 (eight) hours as needed for Pain.    tamsulosin (FLOMAX) 0.4 mg Cap Take 1 capsule (0.4 mg total) by mouth once daily.    traMADol (ULTRAM) 50 mg tablet Take 1 tablet (50 mg total) by mouth every 6 (six) hours as needed for  "Pain. (Patient not taking: Reported on 1/29/2020)     No current facility-administered medications for this visit.      Facility-Administered Medications Ordered in Other Visits   Medication    0.9%  NaCl infusion    0.9%  NaCl infusion       REVIEW OF SYSTEMS:    GENERAL:  No weight loss, malaise or fevers.  HEENT:  Negative for frequent or significant headaches.  NECK:  Negative for lumps, goiter, pain and significant neck swelling.  RESPIRATORY:  Negative for cough, wheezing or shortness of breath. Lung nodules being monitored  CARDIOVASCULAR:  Negative for chest pain, leg swelling or palpitations.  GI:  Positive for abdominal discomfort. Negative for blood in stools or black stools or change in bowel habits.   MUSCULOSKELETAL:  + Lower back pain  SKIN:  Negative for lesions, rash, and itching.  PSYCH:  Positive for sleep disturbance, mood disorder and recent psychosocial stressors.  HEMATOLOGY/LYMPHOLOGY:  Negative for prolonged bleeding, bruising easily or swollen nodes.  NEURO:   No history of headaches, syncope, paralysis, seizures or tremors.  All other reviewed and negative other than HPI.    OBJECTIVE:    BP (!) 140/83   Pulse 85   Temp 98.3 °F (36.8 °C)   Resp 18   Ht 5' 10" (1.778 m)   Wt 93 kg (205 lb 0.4 oz)   SpO2 100%   BMI 29.42 kg/m²     PHYSICAL EXAMINATION:    General appearance: Well appearing, in no acute distress, alert and oriented x3.  Psych:  Mood and affect appropriate.  Skin: Skin color, texture, turgor normal, no rashes or lesions, in both upper and lower body.  Head/face:  Normocephalic, atraumatic. No palpable lymph nodes.  Neck: No pain to palpation over the cervical paraspinous muscles. Spurling Negative. No pain with neck flexion, extension, or lateral flexion.   Cor: RRR  Pulm: CTA  GI:  Soft and non-tender.  Back: Straight leg raising in the sitting and supine positions is negative to radicular pain. No pain to palpation over the spine or costovertebral angles. Limited " ROM of lumbar spine.   Extremities: Peripheral joint ROM is full and pain free without obvious instability or laxity in all four extremities. No deformities, edema, or skin discoloration. Good capillary refill.  Musculoskeletal: Shoulder, hip, sacroiliac and knee provocative maneuvers are negative. Bilateral upper and lower extremity strength is normal and symmetric.  No atrophy or tone abnormalities are noted.  Neuro: Bilateral upper and lower extremity coordination and muscle stretch reflexes are physiologic and symmetric.  Plantar response are downgoing. No loss of sensation is noted.  Gait: normal.    ASSESSMENT: 49 y.o. year old male with lumbar back pain, consistent with      1. Lumbar radiculopathy  Ambulatory consult to Physical Therapy   2. Chronic pain disorder  Ambulatory consult to Physical Therapy   3. Inguinal pain, unspecified laterality     4. Pelvic pain       Given patient's symptoms and MRI findings it is likely that his L2-3 disc protrusion is contributing to pain. His L4-5 epidural did not provide long term relief so we will repeat epidural at the L2-3 level. For patient's abdominal and GI pain is being worked-up by Urology and GI. Patient does heavy lifting and may have inguinal hernias. Advised patient to discuss this with his GI doctor. In the meantime we will send patient for consultation for pelvic physical therapy.    PLAN:   - I have stressed the importance of physical activity and a home exercise plan to help with pain and improve health.    - Patient can continue with medications for now since they are providing benefits, using them appropriately, and without side effects.    - Schedule for L2-3 ILESI    - Referral for Pelvic Physical therapy    -Start Gabapentin 300 mg at bedtime and increase as tolerated to 300 mg TID.    - Consider Superior Hypogastric Plexus blocks in future if ILESI proves inadequate    - RTC 2 weeks post-procedure    - Counseled patient regarding the importance of  activity modification.    The above plan and management options were discussed at length with patient. Patient is in agreement with the above and verbalized understanding. It will be communicated with the referring physician via electronic record, fax, or mail.    Dhiraj Moran  01/29/2020    I have reviewed and concur with the resident's history, physical, assessment, and plan.  I have personally interviewed and examined the patient at bedside.  See below addendum for my evaluation and additional findings.  49 year old male with chronic back and bilateral inguinal area pain consistent L2/L3 lumbar radiculopathy.  We will schedule for L2/L3 epidural steroid injection.  Follow up with him 2 weeks after procedure. Also refer to pelvic physical therapy lower abdominal and pelvic pain.  We will consider superior hypogastric plexus block in the future.    Albert Noble  01/29/2020

## 2020-01-29 NOTE — PROGRESS NOTES
"     Gastroenterology Clinic    Reason for visit: The primary encounter diagnosis was Abdominal pain, unspecified abdominal location. A diagnosis of Constipation, unspecified constipation type was also pertinent to this visit.  Referring provider/PCP: Estiven Worthington MD    HPI:  49 year old male ex-smoker with a history of GERD, chronic prostatitis, and lumbar radiculopathy who presents to clinic for follow up of abdominal pain.    Patient last seen in clinic on 04/2019 with interval history from that visit below:  "Patient presents with his wife to the appointment. Since last being seen in clinic his symptoms have remained the same. He continues to complain of abdominal pain which upon further questioning is more of bloating/gas however per patient is it very distressed to the point where he went to the ED thinking he could be having a heart attack. He reports that although every morning he passes significant amount of gas, when his abdominal pain/bloating becomes significant he cannot pass gas. He does not drink carbonated drinks, doesn't eat dairy, or use straws. He has also try to modify this diet and eat healthier to se if this improves symptoms. He has lost some weight (207 during August clinic appointment and now 193 during this clinic appointment) however he is unsure if it is due to eating healthier. He denies any dysphagia, odynophagia, early satiety, GERD/reflux, nausea, emesis, melena, or hematochezia. He is having 1-2 brown BM per day.He denies any improvement with protonix, FDgard, or IBgard. He does report improvement in symptoms with Phazyme and Bentyl. At the end of the visit patient expressed concern about having pancreatic cancer as he knew someone who was diagnostic late and passed away. He also voice this concern during the last clinic visit."    Interval History:  After his last visit we recommended the following:  -Continue PPI  -Continue Bentyl  -Start Iberogast (you need to purchase it on " "[employer] and follow instructions on the back)  -Celiac testing-celiac panel negative 4/22/19  -Lactulose breath test-negative on 5/10/19     He presents to clinic with his wife and reports being frustrated to the point that he became tearful during the visit.    Patient has had abdominal pain dating back to 2013 which was initially epigastric and led to his GB being removed. His current pain is constant as there "is not 1 minute in the day that he doesn't have it" and interferes with his sleep. The pain is located in the RLQ/LLQ radiating to his thighs, testicles, and anus/rectum. Apart from the pain he reports ongoing issues with gas/bloating as he feels like he produces a lot of gas which "he cannot release". Furthermore when passing gas from below and having a BM he feels pressure in the rectum. He reports that warm baths and heating pads help his abdominal pain. He denies any weight loss (he was 193 on 04/2019 and today he is 204), dysphagia, odynophagia, nausea, emesis, or overt signs of bleeding. He is on a daily PPI, Bentyl as needed (he feels like this is no longer working and makes his mouth dry), 10-12 Ibuprofen a day, and every morning mixes Metamucil/Miralax/Iberogast.     Of note during the end of our conversation patient reported 2 instances (1 during a vacation to a fishing camp and 1 about 1 month ago) during which his pain was significantly worse and after increasing his metamucil/having a good BM he felt significant improvement.    Prior Endo Hx:  Colon 10/17/18  The entire examined colon is normal.  The examined portion of the ileum was normal.  No specimens collected.    EGD 10/17/18  Normal esophagus.  Erythematous mucosa in the antrum. Biopsied.  Duodenitis. Biopsied.  Normal second portion of the duodenum.  Path:  FRAGMENTS OF NONNEOPLASTIC GASTRIC MUCOSA WITH NO ACTIVE INFLAMMATION, EVIDENCE OF H PYLORI OR DYSPLASIA IDENTIFIED.  FRAGMENTS OF NONNEOPLASTIC DUODENAL MUCOSA SHOWING " REACTIVE/REGENERATIVE CHANGES CONSISTENT WITH PEPTIC DUODENITIS, NO DYSPLASIA IDENTIFIED.    Colonoscopy 11/20/15  Cecum reached, good prep  Internal hemorrhoids  Path:  Ascending colon-colonic mucosa with no pathology  Sigmoid colon-colonic mucosa with mild non-specific chronic inflammation     EGD 7/31/15  Normal esophagus  Erythema/granularity in the antrum compatible with erosive gastropathy  Normal duodenum  Pathology:  Stomach (antrum)-mild chronic gastritis with focal reactive change, no HP  Duodenal bulb-small intestinal mucosa with mild non-specific chronic inflammation  Second part of duodenum-small intestinal mucosa with no pathology    EGD 2013  Esophageal mucosal changes suspicious for eosinophilic esophagitis.  Normal stomach.  Normal examined duodenum.  Path:  DISTAL AND PROXIMAL ESOPHAGUS; BIOPSIES:UNREMARKABLE SQUAMOUS MUCOSA.NEGATIVE FOR EOSINOPHILS.    Review of Systems:  Constitutional: no fever, chills   Eyes: no visual changes   ENT: no sore throat or dysphagia  Respiratory: no cough or shortness of breath   Cardiovascular: no chest pain or palpitations   Gastrointestinal: as per HPI  Hematologic/Lymphatic: no easy bruising or lymphadenopathy   Musculoskeletal: no arthralgias or myalgias   Neurological: no change in mental status  Behavioral/Psych: no change in mood      Past Medical History:   Diagnosis Date    Chronic pain     Diverticulosis     GERD (gastroesophageal reflux disease)     Hemorrhoids     Lumbar radiculopathy        Past Surgical History:   Procedure Laterality Date    CHOLECYSTECTOMY      COLONOSCOPY      COLONOSCOPY N/A 10/17/2018    Procedure: COLONOSCOPY;  Surgeon: Cinda Davey MD;  Location: [address];  Service: Endoscopy;  Laterality: N/A;    ESOPHAGOGASTRODUODENOSCOPY      ESOPHAGOGASTRODUODENOSCOPY N/A 10/17/2018    Procedure: EGD (ESOPHAGOGASTRODUODENOSCOPY);  Surgeon: Cinda Davey MD;  Location: [address];  Service: Endoscopy;   Laterality: N/A;  Patient requested this date for transportation    TRANSFORAMINAL EPIDURAL INJECTION OF STEROID Bilateral 10/25/2019    Procedure: Injection,steroid,epidural,transforaminal approach LUMBAR TRANSFORAMINAL BILATERAL L5 TF ALBIN;  Surgeon: Albert Noble MD;  Location[facility];  Service: Pain Management;  Laterality: Bilateral;  NEEDS CONSENT       Family History   Problem Relation Age of Onset    Heart disease Father     Colon cancer Neg Hx     Esophageal cancer Neg Hx     Stomach cancer Neg Hx     Ulcerative colitis Neg Hx     Crohn's disease Neg Hx     Celiac disease Neg Hx     Irritable bowel syndrome Neg Hx        Social History     Socioeconomic History    Marital status:      Spouse name: Not on file    Number of children: Not on file    Years of education: Not on file    Highest education level: Not on file   Occupational History    Not on file   Social Needs    Financial resource strain: Not on file    Food insecurity:     Worry: Not on file     Inability: Not on file    Transportation needs:     Medical: Not on file     Non-medical: Not on file   Tobacco Use    Smoking status: Former Smoker     Packs/day: 1.00     Years: 15.00     Pack years: 15.00     Types: Cigarettes     Last attempt to quit: 10/17/2002     Years since quittin.2    Smokeless tobacco: Former User     Quit date: 2018    Tobacco comment: quit smoking 15 years ago   Substance and Sexual Activity    Alcohol use: Not Currently     Comment: on weekends    Drug use: No    Sexual activity: Not on file   Lifestyle    Physical activity:     Days per week: Not on file     Minutes per session: Not on file    Stress: Not on file   Relationships    Social connections:     Talks on phone: Not on file     Gets together: Not on file     Attends Mandaen service: Not on file     Active member of club or organization: Not on file     Attends meetings of clubs or organizations: Not on file      "Relationship status: Not on file   Other Topics Concern    Not on file   Social History Narrative    Not on file       Current Outpatient Medications on File Prior to Visit   Medication Sig Dispense Refill    diazePAM (VALIUM) 5 MG tablet 1 po 30 minutes prior to MRI, may repeat x 1 right before MRI if needed. Will need . 2 tablet 0    meloxicam (MOBIC) 15 MG tablet Take 1 tablet (15 mg total) by mouth once daily. Take with food. 30 tablet 1    pantoprazole (PROTONIX) 40 MG tablet Take 1 tablet (40 mg total) by mouth once daily. 365 tablet 0    phenazopyridine (PYRIDIUM) 200 MG tablet Take 1 tablet (200 mg total) by mouth every 8 (eight) hours as needed for Pain. 6 tablet 0    tamsulosin (FLOMAX) 0.4 mg Cap Take 1 capsule (0.4 mg total) by mouth once daily. 30 capsule 11    traMADol (ULTRAM) 50 mg tablet Take 1 tablet (50 mg total) by mouth every 6 (six) hours as needed for Pain. 9 tablet 0     Current Facility-Administered Medications on File Prior to Visit   Medication Dose Route Frequency Provider Last Rate Last Dose    0.9%  NaCl infusion  500 mL Intravenous Continuous Albert Noble MD        0.9%  NaCl infusion  500 mL Intravenous Continuous Albert Noble MD           Review of patient's allergies indicates:  No Known Allergies    Physical Exam:  /85   Pulse 72   Ht 5' 10" (1.778 m)   Wt 92.9 kg (204 lb 12.9 oz)   BMI 29.39 kg/m²   General appearance: alert, appears stated age and cooperative  Back: symmetric, no curvature. ROM normal. No CVA tenderness.  Lungs: clear to auscultation bilaterally  Chest wall: no tenderness  Heart: regular rate and rhythm, S1, S2 normal, no murmur, click, rub or gallop  Abdomen: Well healed scars from lap shane, abdomen not distended but tender in the LLQ  Extremities: extremities normal, atraumatic, no cyanosis or edema  Pulses: 2+ and symmetric   MKS: No tenderness on the SP but the was tender to palpation alongside the left para spinal " muscles    Laboratory:  Lab Results   Component Value Date    WBC 5.61 03/04/2019    HGB 15.5 03/04/2019    HCT 45.1 03/04/2019    MCV 88 03/04/2019     03/04/2019         CMP  Sodium   Date Value Ref Range Status   03/04/2019 140 136 - 145 mmol/L Final     Potassium   Date Value Ref Range Status   03/04/2019 4.9 3.5 - 5.1 mmol/L Final     Chloride   Date Value Ref Range Status   03/04/2019 108 95 - 110 mmol/L Final     CO2   Date Value Ref Range Status   03/04/2019 23 23 - 29 mmol/L Final     Glucose   Date Value Ref Range Status   03/04/2019 95 70 - 110 mg/dL Final     BUN, Bld   Date Value Ref Range Status   03/04/2019 20 6 - 20 mg/dL Final     Creatinine   Date Value Ref Range Status   03/04/2019 1.1 0.5 - 1.4 mg/dL Final     Calcium   Date Value Ref Range Status   03/04/2019 9.6 8.7 - 10.5 mg/dL Final     Total Protein   Date Value Ref Range Status   03/04/2019 6.5 6.0 - 8.4 g/dL Final     Albumin   Date Value Ref Range Status   03/04/2019 3.7 3.5 - 5.2 g/dL Final     Total Bilirubin   Date Value Ref Range Status   03/04/2019 0.7 0.1 - 1.0 mg/dL Final     Comment:     For infants and newborns, interpretation of results should be based  on gestational age, weight and in agreement with clinical  observations.  Premature Infant recommended reference ranges:  Up to 24 hours.............<8.0 mg/dL  Up to 48 hours............<12.0 mg/dL  3-5 days..................<15.0 mg/dL  6-29 days.................<15.0 mg/dL       Alkaline Phosphatase   Date Value Ref Range Status   03/04/2019 56 55 - 135 U/L Final     AST   Date Value Ref Range Status   03/04/2019 17 10 - 40 U/L Final     ALT   Date Value Ref Range Status   03/04/2019 21 10 - 44 U/L Final     Anion Gap   Date Value Ref Range Status   03/04/2019 9 8 - 16 mmol/L Final     eGFR if    Date Value Ref Range Status   03/04/2019 >60.0 >60 mL/min/1.73 m^2 Final     eGFR if non    Date Value Ref Range Status   03/04/2019 >60.0 >60  mL/min/1.73 m^2 Final     Comment:     Calculation used to obtain the estimated glomerular filtration  rate (eGFR) is the CKD-EPI equation.          Imaging:  CT A/P 2018  Prominent section of distal small bowel likely representing nondistention.  Less likely this may represent mild bowel wall enhancement/thickening, such as seen in infection or inflammation.  Recommend clinical correlation.  Two pulmonary micronodule in the right lower lobe measuring up to 0.4 cm.  For multiple solid nodules all <6 mm, Fleischner Society 2017 guidelines recommend no routine follow up for a low risk patient, or follow up with non-contrast chest CT at 12 months after discovery in a high risk patient.  Hepatic steatosis.  Status post cholecystectomy.    XR upper GI with small bowel series 9/8/17  Moderate GERD, otherwise normal esophagus, stomach, and small bowel      CT abdomen/pelvis with contrast 11/2015  Several prominent mesenteric lymph nodes in the right lower quadrant of the abdomen, although non-specific this can be associated with mesenteric adenitis  Previous sukhjinder  Small pulmonary nodule     MRCP 6/3/14  Normal intrahepatic/extrahepatic biliary radicals with normal appearing common bile duct. No evidence of biliary obstruction or luminal mass    Assessment:  49 year old male ex-smoker with a history of GERD, chronic prostatitis, and lumbar radiculopathy who presents to clinic for follow up of abdominal pain.    Plan:  Ongoing abdominal pain likely secondary to irritable bowel syndrome (IBS)  Patient has had abdominal pain dating back to 2013 (going on 7 years now) granted in most recent years the location has changed (no lower rather than epigastric) and intensity has worsened. However despite the distressing pain he has no weight loss, he is not anemic, and studies (CT, scopes, and lactulose test) have been relatively negative to date (did have prior CT with some non-specific LN and questionable small bowel thickening).  With this being said we feel that his pain is likely functional and multifactorial (has issues with lumbar radiculopathy and chronic prostatitis). At this point we do not feel additional diagnostic testing will be helpful and actually want to alter his current medications. We want him to come off all NSAID as well as Protonix, Bentyl, Iberogast, Metamucil, and Miralax. We then want him to start on Linzess as we wonder if constipation is a major  of his pain/gas. Based on how he does with Linzess we can also consider a TCA or even an empiric trial of Rifaximin in the future.  --NO NSAIDS (renard is taking 10-12 Ibuprofen a day which he needs to work on stopping)  --Continue Protonix until you stop the Ibuprofen  --Stop Bentyl  --Stop Iberogast  --Stop Metamucil and Miralax  --Start Linzess 72 mcg once a day  --Please call the office in 1 month for an update  --Follow up with Urology  --Follow up with PT and spine injections  --Follow up in GI clinic in 3 months     Colon cancer screening  --Repeat colonoscopy  as per recommendations on colonoscopy 2018    Pulmonary nodule  --Repeat CT 2019, ordered placed but  as patient never obtain study  --Follow up with PCP    No orders of the defined types were placed in this encounter.    Betsy Ramírez M.D.  Gastroenterology Fellow, PGY-VI  Pager: [phone]  [facility]

## 2020-01-30 ENCOUNTER — TELEPHONE (OUTPATIENT)
Dept: PAIN MEDICINE | Facility: CLINIC | Age: 50
End: 2020-01-30

## 2020-02-03 ENCOUNTER — TELEPHONE (OUTPATIENT)
Dept: PAIN MEDICINE | Facility: CLINIC | Age: 50
End: 2020-02-03

## 2020-02-03 DIAGNOSIS — M51.37 DDD (DEGENERATIVE DISC DISEASE), LUMBOSACRAL: ICD-10-CM

## 2020-02-03 DIAGNOSIS — G89.29 CHRONIC BILATERAL LOW BACK PAIN WITH BILATERAL SCIATICA: ICD-10-CM

## 2020-02-03 DIAGNOSIS — M54.41 CHRONIC BILATERAL LOW BACK PAIN WITH BILATERAL SCIATICA: ICD-10-CM

## 2020-02-03 DIAGNOSIS — M43.10 ACQUIRED SPONDYLOLISTHESIS: ICD-10-CM

## 2020-02-03 DIAGNOSIS — M54.14 THORACIC AND LUMBOSACRAL NEURITIS: ICD-10-CM

## 2020-02-03 DIAGNOSIS — M54.42 CHRONIC BILATERAL LOW BACK PAIN WITH BILATERAL SCIATICA: ICD-10-CM

## 2020-02-03 DIAGNOSIS — M54.17 THORACIC AND LUMBOSACRAL NEURITIS: ICD-10-CM

## 2020-02-03 RX ORDER — MELOXICAM 15 MG/1
15 TABLET ORAL DAILY
Qty: 30 TABLET | Refills: 1 | Status: SHIPPED | OUTPATIENT
Start: 2020-02-03 | End: 2020-04-06

## 2020-02-19 ENCOUNTER — TELEPHONE (OUTPATIENT)
Dept: PAIN MEDICINE | Facility: CLINIC | Age: 50
End: 2020-02-19

## 2020-02-19 NOTE — TELEPHONE ENCOUNTER
Staff contacted pt regarding scheduling procedure.    Staff spoke to pt wife regarding scheduling appt for procedure     Staff informed wife we've called several times and wasn't able to leave voicemail's    Wife informed staff she doesn't get service in some areas of their home but she'll make sure to have phone near her    Staff informed pt the schedulers will contact her again either today or tomorrow during clinic hours    Wife verbalized understanding

## 2020-02-19 NOTE — TELEPHONE ENCOUNTER
----- Message from Olga Galvez sent at 2/19/2020 12:30 PM CST -----  Type:  Patient Returning Call    Who Called:wife   Who Left Message for Patient:Maria G his wife   Does the patient know what this is regarding?: say they been waiting on daphne for an appt for Edward  Would the patient rather a call back or a response via [facility]?  Call   Best Call Back Number:[phone]  Additional Information:  It has been over 2 weeks  For an appt an a call back

## 2020-02-24 ENCOUNTER — PATIENT MESSAGE (OUTPATIENT)
Dept: GASTROENTEROLOGY | Facility: CLINIC | Age: 50
End: 2020-02-24

## 2020-02-26 ENCOUNTER — PATIENT MESSAGE (OUTPATIENT)
Dept: GASTROENTEROLOGY | Facility: CLINIC | Age: 50
End: 2020-02-26

## 2020-02-26 DIAGNOSIS — R10.9 ABDOMINAL PAIN, UNSPECIFIED ABDOMINAL LOCATION: Primary | ICD-10-CM

## 2020-02-26 RX ORDER — NORTRIPTYLINE HYDROCHLORIDE 10 MG/1
10 CAPSULE ORAL NIGHTLY
Qty: 30 CAPSULE | Refills: 2 | Status: SHIPPED | OUTPATIENT
Start: 2020-02-26 | End: 2020-05-18 | Stop reason: SDUPTHER

## 2020-02-28 ENCOUNTER — PATIENT MESSAGE (OUTPATIENT)
Dept: GASTROENTEROLOGY | Facility: CLINIC | Age: 50
End: 2020-02-28

## 2020-03-04 ENCOUNTER — TELEPHONE (OUTPATIENT)
Dept: GASTROENTEROLOGY | Facility: CLINIC | Age: 50
End: 2020-03-04

## 2020-03-04 NOTE — TELEPHONE ENCOUNTER
----- Message from Betsy Ramírez MD sent at 3/4/2020  4:46 PM CST -----  Thanks Ms. Hernadez  ----- Message -----  From: Puja Jon MA  Sent: 3/4/2020   4:36 PM CST  To: Betsy Ramírez MD    Scheduled to see you on 4/8 at 2:00.  Will mail confirmation.  ----- Message -----  From: Betsy Ramírez MD  Sent: 3/4/2020   4:29 PM CST  To: Puja Jon MA    Hi Ms. Hernadez    His wife has messaged me quite a bit and I started a new medication. Can we make sure he comes back in April before I leave?    YA  ----- Message -----  From: Betsy Ramírez MD  Sent: 3/4/2020  To: Betsy Ramírez MD    Make sure he has follow up with him

## 2020-03-09 ENCOUNTER — OFFICE VISIT (OUTPATIENT)
Dept: PRIMARY CARE CLINIC | Facility: CLINIC | Age: 50
End: 2020-03-09
Payer: COMMERCIAL

## 2020-03-09 VITALS
WEIGHT: 203.38 LBS | HEIGHT: 70 IN | DIASTOLIC BLOOD PRESSURE: 74 MMHG | OXYGEN SATURATION: 97 % | BODY MASS INDEX: 29.12 KG/M2 | SYSTOLIC BLOOD PRESSURE: 110 MMHG | TEMPERATURE: 99 F | RESPIRATION RATE: 16 BRPM | HEART RATE: 105 BPM

## 2020-03-09 DIAGNOSIS — J10.1 INFLUENZA A: Primary | ICD-10-CM

## 2020-03-09 DIAGNOSIS — J11.1 FLU: ICD-10-CM

## 2020-03-09 LAB
CTP QC/QA: YES
POC MOLECULAR INFLUENZA A AGN: POSITIVE
POC MOLECULAR INFLUENZA B AGN: NEGATIVE

## 2020-03-09 PROCEDURE — 99213 PR OFFICE/OUTPT VISIT, EST, LEVL III, 20-29 MIN: ICD-10-PCS | Mod: S$GLB,,, | Performed by: FAMILY MEDICINE

## 2020-03-09 PROCEDURE — 99213 OFFICE O/P EST LOW 20 MIN: CPT | Mod: S$GLB,,, | Performed by: FAMILY MEDICINE

## 2020-03-09 PROCEDURE — 99999 PR PBB SHADOW E&M-EST. PATIENT-LVL III: CPT | Mod: PBBFAC,,, | Performed by: FAMILY MEDICINE

## 2020-03-09 PROCEDURE — 99999 PR PBB SHADOW E&M-EST. PATIENT-LVL III: ICD-10-PCS | Mod: PBBFAC,,, | Performed by: FAMILY MEDICINE

## 2020-03-09 PROCEDURE — 87502 INFLUENZA DNA AMP PROBE: CPT | Mod: QW,S$GLB,, | Performed by: FAMILY MEDICINE

## 2020-03-09 PROCEDURE — 87502 POCT INFLUENZA A/B MOLECULAR: ICD-10-PCS | Mod: QW,S$GLB,, | Performed by: FAMILY MEDICINE

## 2020-03-09 RX ORDER — BENZONATATE 100 MG/1
100 CAPSULE ORAL 3 TIMES DAILY PRN
Qty: 30 CAPSULE | Refills: 0 | Status: SHIPPED | OUTPATIENT
Start: 2020-03-09 | End: 2020-03-19

## 2020-03-09 RX ORDER — PANTOPRAZOLE SODIUM 20 MG/1
20 TABLET, DELAYED RELEASE ORAL DAILY
COMMUNITY
End: 2020-10-21 | Stop reason: SDUPTHER

## 2020-03-09 NOTE — PROGRESS NOTES
"Subjective:       Patient ID: Lola Stevens Jr. is a 49 y.o. male.    Chief Complaint: Cough (sinus headache, felt feverish last night since Saturday)    Complain of chills, fevers, coughing up phlegm for the past 3 days. Recently got back from a local festival. No known sick contacts.    Review of Systems   Constitutional: Positive for fever. Negative for activity change and chills.   HENT: Positive for congestion and sore throat.    Respiratory: Positive for cough, shortness of breath and wheezing.    Cardiovascular: Negative for chest pain and leg swelling.   Gastrointestinal: Negative for abdominal distention, abdominal pain, constipation, nausea and vomiting.   Neurological: Negative for weakness.       Objective:      Vitals:    03/09/20 1320   BP: 110/74   BP Location: Left arm   Patient Position: Sitting   BP Method: Large (Manual)   Pulse: 105   Resp: 16   Temp: 99.1 °F (37.3 °C)   TempSrc: Oral   SpO2: 97%   Weight: 92.3 kg (203 lb 6 oz)   Height: 5' 10" (1.778 m)     Physical Exam   Constitutional: He appears well-developed and well-nourished. He appears ill.   HENT:   Head: Normocephalic and atraumatic.   Nose: Nose normal.   Mouth/Throat: Oropharynx is clear and moist.   Eyes: Conjunctivae and EOM are normal.   Cardiovascular: Normal rate, regular rhythm and normal heart sounds.   Pulmonary/Chest: Effort normal and breath sounds normal. No respiratory distress. He has no wheezes. He has no rales.   Abdominal: Soft. He exhibits no distension. There is no tenderness.   Lymphadenopathy:     He has no cervical adenopathy.   Neurological: He is alert. No cranial nerve deficit.   Psychiatric: He has a normal mood and affect.   Nursing note and vitals reviewed.          Lab Results   Component Value Date     03/04/2019    K 4.9 03/04/2019     03/04/2019    CO2 23 03/04/2019    BUN 20 03/04/2019    CREATININE 1.1 03/04/2019    ANIONGAP 9 03/04/2019     Lab Results   Component Value Date    " HGBA1C 5.1 03/04/2019     No results found for: BNP, BNPTRIAGEBLO    Lab Results   Component Value Date    WBC 5.61 03/04/2019    HGB 15.5 03/04/2019    HCT 45.1 03/04/2019     03/04/2019    GRAN 3.0 03/04/2019    GRAN 54.1 03/04/2019     Lab Results   Component Value Date    CHOL 213 (H) 03/04/2019    HDL 41 03/04/2019    LDLCALC 134.6 03/04/2019    TRIG 187 (H) 03/04/2019          Current Outpatient Medications:     nortriptyline (PAMELOR) 10 MG capsule, Take 1 capsule (10 mg total) by mouth every evening., Disp: 30 capsule, Rfl: 2    pantoprazole (PROTONIX) 20 MG tablet, Take 20 mg by mouth once daily., Disp: , Rfl:     tamsulosin (FLOMAX) 0.4 mg Cap, Take 1 capsule (0.4 mg total) by mouth once daily., Disp: 30 capsule, Rfl: 11    benzonatate (TESSALON) 100 MG capsule, Take 1 capsule (100 mg total) by mouth 3 (three) times daily as needed for Cough., Disp: 30 capsule, Rfl: 0    diazePAM (VALIUM) 5 MG tablet, 1 po 30 minutes prior to MRI, may repeat x 1 right before MRI if needed. Will need ., Disp: 2 tablet, Rfl: 0    dicyclomine (BENTYL) 10 MG capsule, Take 10 mg by mouth 4 (four) times daily before meals and nightly., Disp: , Rfl:     gabapentin (NEURONTIN) 300 MG capsule, Take 1 capsule (300 mg total) by mouth 3 (three) times daily. One at bedtime for 7 days, then 2 a day for 7 days and then 3 a day, Disp: 90 capsule, Rfl: 2    meloxicam (MOBIC) 15 MG tablet, Take 1 tablet (15 mg total) by mouth once daily. Take with food., Disp: 30 tablet, Rfl: 1    phenazopyridine (PYRIDIUM) 200 MG tablet, Take 1 tablet (200 mg total) by mouth every 8 (eight) hours as needed for Pain., Disp: 6 tablet, Rfl: 0    traMADol (ULTRAM) 50 mg tablet, Take 1 tablet (50 mg total) by mouth every 6 (six) hours as needed for Pain., Disp: 9 tablet, Rfl: 0  No current facility-administered medications for this visit.     Facility-Administered Medications Ordered in Other Visits:     0.9%  NaCl infusion, 500 mL,  Intravenous, Continuous, Albert Noble MD    0.9%  NaCl infusion, 500 mL, Intravenous, Continuous, Albert Noble MD        Assessment:       1. Influenza A    2. Flu           Plan:       Influenza A  -     benzonatate (TESSALON) 100 MG capsule; Take 1 capsule (100 mg total) by mouth 3 (three) times daily as needed for Cough.  Dispense: 30 capsule; Refill: 0  -     POCT Influenza A/B Molecular    Flu     Positive flu. Outside 48 hour window for recommended tamiflu. Non toxic. Encouraged rest and hydration. Tylenol for pain and headache.

## 2020-03-09 NOTE — LETTER
March 9, 2020      [facility]  [address]  [facility] [address]  Phone: [phone]  Fax: [phone]       Patient: Lola Stevens   YOB: 1970  Date of Visit: 03/09/2020    To Whom It May Concern:    Sundeep Stevens  was at [facility] on 03/09/2020. He may return to work on 3/16/20 without restrictions. If you have any questions or concerns, or if I can be of further assistance, please do not hesitate to contact me.    Sincerely,      Marina Kaplan LPN

## 2020-03-24 ENCOUNTER — TELEPHONE (OUTPATIENT)
Dept: GASTROENTEROLOGY | Facility: CLINIC | Age: 50
End: 2020-03-24

## 2020-03-24 NOTE — TELEPHONE ENCOUNTER
Spoke with patient regarding his appointment with  on 4/8.   Because of the Covid-19 he is aware [facility] is cancelling all appointments for that week. Offered him a virtual visit but he declined stating he will call back to reschedule for a later date.

## 2020-04-01 ENCOUNTER — PATIENT MESSAGE (OUTPATIENT)
Dept: PAIN MEDICINE | Facility: CLINIC | Age: 50
End: 2020-04-01

## 2020-04-05 DIAGNOSIS — G89.29 CHRONIC BILATERAL LOW BACK PAIN WITH BILATERAL SCIATICA: ICD-10-CM

## 2020-04-05 DIAGNOSIS — M54.41 CHRONIC BILATERAL LOW BACK PAIN WITH BILATERAL SCIATICA: ICD-10-CM

## 2020-04-05 DIAGNOSIS — M54.14 THORACIC AND LUMBOSACRAL NEURITIS: ICD-10-CM

## 2020-04-05 DIAGNOSIS — M51.37 DDD (DEGENERATIVE DISC DISEASE), LUMBOSACRAL: ICD-10-CM

## 2020-04-05 DIAGNOSIS — M54.17 THORACIC AND LUMBOSACRAL NEURITIS: ICD-10-CM

## 2020-04-05 DIAGNOSIS — M43.10 ACQUIRED SPONDYLOLISTHESIS: ICD-10-CM

## 2020-04-05 DIAGNOSIS — M54.42 CHRONIC BILATERAL LOW BACK PAIN WITH BILATERAL SCIATICA: ICD-10-CM

## 2020-04-06 RX ORDER — MELOXICAM 15 MG/1
15 TABLET ORAL DAILY
Qty: 30 TABLET | Refills: 0 | Status: SHIPPED | OUTPATIENT
Start: 2020-04-06 | End: 2020-06-11

## 2020-04-22 ENCOUNTER — TELEPHONE (OUTPATIENT)
Dept: PAIN MEDICINE | Facility: OTHER | Age: 50
End: 2020-04-22

## 2020-05-18 ENCOUNTER — PATIENT MESSAGE (OUTPATIENT)
Dept: SPINE | Facility: CLINIC | Age: 50
End: 2020-05-18

## 2020-05-18 DIAGNOSIS — R10.9 ABDOMINAL PAIN, UNSPECIFIED ABDOMINAL LOCATION: ICD-10-CM

## 2020-05-18 RX ORDER — NORTRIPTYLINE HYDROCHLORIDE 10 MG/1
10 CAPSULE ORAL NIGHTLY
Qty: 30 CAPSULE | Refills: 2 | Status: SHIPPED | OUTPATIENT
Start: 2020-05-18 | End: 2020-06-10

## 2020-05-26 ENCOUNTER — TELEPHONE (OUTPATIENT)
Dept: GASTROENTEROLOGY | Facility: CLINIC | Age: 50
End: 2020-05-26

## 2020-05-26 ENCOUNTER — TELEPHONE (OUTPATIENT)
Dept: ADMINISTRATIVE | Facility: OTHER | Age: 50
End: 2020-05-26

## 2020-05-26 NOTE — TELEPHONE ENCOUNTER
Spoke with patient.  Aware  will be leaving.  Okay with seeing  for his stomach pain on 6/10 for 4:00.  Confirmation mailed.

## 2020-06-05 ENCOUNTER — HOSPITAL ENCOUNTER (OUTPATIENT)
Facility: OTHER | Age: 50
Discharge: HOME | End: 2020-06-05
Attending: ANESTHESIOLOGY | Admitting: ANESTHESIOLOGY
Payer: COMMERCIAL

## 2020-06-05 VITALS
OXYGEN SATURATION: 95 % | HEART RATE: 75 BPM | SYSTOLIC BLOOD PRESSURE: 152 MMHG | TEMPERATURE: 98 F | WEIGHT: 200 LBS | RESPIRATION RATE: 16 BRPM | HEIGHT: 70 IN | DIASTOLIC BLOOD PRESSURE: 92 MMHG | BODY MASS INDEX: 28.63 KG/M2

## 2020-06-05 DIAGNOSIS — G89.29 CHRONIC PAIN: ICD-10-CM

## 2020-06-05 DIAGNOSIS — M54.16 LUMBAR RADICULOPATHY: Primary | ICD-10-CM

## 2020-06-05 PROCEDURE — 63600175 PHARM REV CODE 636 W HCPCS: Performed by: ANESTHESIOLOGY

## 2020-06-05 PROCEDURE — 62323 PR INJ LUMBAR/SACRAL, W/IMAGING GUIDANCE: ICD-10-PCS | Mod: ,,, | Performed by: ANESTHESIOLOGY

## 2020-06-05 PROCEDURE — 25500020 PHARM REV CODE 255: Performed by: ANESTHESIOLOGY

## 2020-06-05 PROCEDURE — 25000003 PHARM REV CODE 250: Performed by: ANESTHESIOLOGY

## 2020-06-05 PROCEDURE — 62323 NJX INTERLAMINAR LMBR/SAC: CPT | Mod: ,,, | Performed by: ANESTHESIOLOGY

## 2020-06-05 PROCEDURE — 62323 NJX INTERLAMINAR LMBR/SAC: CPT

## 2020-06-05 RX ORDER — LIDOCAINE HYDROCHLORIDE 10 MG/ML
INJECTION INFILTRATION; PERINEURAL
Status: DISCONTINUED | OUTPATIENT
Start: 2020-06-05 | End: 2020-06-05 | Stop reason: HOSPADM

## 2020-06-05 RX ORDER — DEXAMETHASONE SODIUM PHOSPHATE 4 MG/ML
INJECTION, SOLUTION INTRA-ARTICULAR; INTRALESIONAL; INTRAMUSCULAR; INTRAVENOUS; SOFT TISSUE
Status: DISCONTINUED | OUTPATIENT
Start: 2020-06-05 | End: 2020-06-05 | Stop reason: HOSPADM

## 2020-06-05 RX ORDER — ALPRAZOLAM 0.5 MG/1
1 TABLET ORAL ONCE
Status: COMPLETED | OUTPATIENT
Start: 2020-06-05 | End: 2020-06-05

## 2020-06-05 RX ORDER — SODIUM CHLORIDE 9 MG/ML
500 INJECTION, SOLUTION INTRAVENOUS CONTINUOUS
Status: DISCONTINUED | OUTPATIENT
Start: 2020-06-05 | End: 2020-06-05 | Stop reason: HOSPADM

## 2020-06-05 RX ORDER — LIDOCAINE HYDROCHLORIDE 5 MG/ML
INJECTION, SOLUTION INFILTRATION; INTRAVENOUS
Status: DISCONTINUED | OUTPATIENT
Start: 2020-06-05 | End: 2020-06-05 | Stop reason: HOSPADM

## 2020-06-05 RX ADMIN — ALPRAZOLAM 1 MG: 0.5 TABLET ORAL at 03:06

## 2020-06-05 NOTE — H&P
Chronic Pain - New Consult    Referring Physician: Estiven Worthington MD    Chief Complaint: No chief complaint on file.       SUBJECTIVE: Disclaimer: This note has been generated using voice-recognition software. There may be typographical errors that have been missed during proof-reading    Initial encounter:    Lola Stevens Jr.  49 y.o. Male with 49 y.o. Lumbar foraminal stenosis  Presented for ALBIN L2/L3.    Past Medical History:   Diagnosis Date    Chronic pain     Diverticulosis     GERD (gastroesophageal reflux disease)     Hemorrhoids     Lumbar radiculopathy      Past Surgical History:   Procedure Laterality Date    CHOLECYSTECTOMY      COLONOSCOPY      COLONOSCOPY N/A 10/17/2018    Procedure: COLONOSCOPY;  Surgeon: Cinda Davey MD;  Location: [address]);  Service: Endoscopy;  Laterality: N/A;    ESOPHAGOGASTRODUODENOSCOPY      ESOPHAGOGASTRODUODENOSCOPY N/A 10/17/2018    Procedure: EGD (ESOPHAGOGASTRODUODENOSCOPY);  Surgeon: Cinda Davey MD;  Location: [address]);  Service: Endoscopy;  Laterality: N/A;  Patient requested this date for transportation    TRANSFORAMINAL EPIDURAL INJECTION OF STEROID Bilateral 10/25/2019    Procedure: Injection,steroid,epidural,transforaminal approach LUMBAR TRANSFORAMINAL BILATERAL L5 TF ALBIN;  Surgeon: Albert Noble MD;  Location: [facility];  Service: Pain Management;  Laterality: Bilateral;  NEEDS CONSENT     Social History     Socioeconomic History    Marital status:      Spouse name: Not on file    Number of children: Not on file    Years of education: Not on file    Highest education level: Not on file   Occupational History    Not on file   Social Needs    Financial resource strain: Not on file    Food insecurity:     Worry: Not on file     Inability: Not on file    Transportation needs:     Medical: Not on file     Non-medical: Not on file   Tobacco Use    Smoking status: Former Smoker     Packs/day:  1.00     Years: 15.00     Pack years: 15.00     Types: Cigarettes     Last attempt to quit: 10/17/2002     Years since quittin.6    Smokeless tobacco: Former User     Quit date: 2018    Tobacco comment: quit smoking 15 years ago   Substance and Sexual Activity    Alcohol use: Not Currently     Comment: on weekends    Drug use: No    Sexual activity: Yes   Lifestyle    Physical activity:     Days per week: Not on file     Minutes per session: Not on file    Stress: Not at all   Relationships    Social connections:     Talks on phone: Not on file     Gets together: Not on file     Attends Anabaptism service: Not on file     Active member of club or organization: Not on file     Attends meetings of clubs or organizations: Not on file     Relationship status: Not on file   Other Topics Concern    Not on file   Social History Narrative    Not on file     Family History   Problem Relation Age of Onset    Heart disease Father     Colon cancer Neg Hx     Esophageal cancer Neg Hx     Stomach cancer Neg Hx     Ulcerative colitis Neg Hx     Crohn's disease Neg Hx     Celiac disease Neg Hx     Irritable bowel syndrome Neg Hx        Review of patient's allergies indicates:  No Known Allergies    No current facility-administered medications for this encounter.      Facility-Administered Medications Ordered in Other Encounters   Medication    0.9%  NaCl infusion    0.9%  NaCl infusion       REVIEW OF SYSTEMS:    GENERAL:  No weight loss, malaise or fevers.  HEENT:   No recent changes in vision or hearing  NECK:  Negative for lumps, no difficulty with swallowing.  RESPIRATORY:  Negative for cough, wheezing or shortness of breath, patient denies any recent URI.  CARDIOVASCULAR:  Negative for chest pain, leg swelling or palpitations.  GI:  Negative for abdominal discomfort, blood in stools or black stools or change in bowel habits.  MUSCULOSKELETAL:  See HPI.  SKIN:  Negative for lesions, rash, and  itching.  PSYCH:  No mood disorder or recent psychosocial stressors.  Patients sleep is not disturbed secondary to pain.  HEMATOLOGY/LYMPHOLOGY:  Negative for prolonged bleeding, bruising easily or swollen nodes.  Patient is not currently taking any anti-coagulants  ENDO: No history of diabetes or thyroid dysfunction  NEURO:   No history of headaches, syncope, paralysis, seizures or tremors.  All other reviewed and negative other than HPI.    OBJECTIVE:    There were no vitals taken for this visit.    PHYSICAL EXAMINATION:    GENERAL: Well appearing, in no acute distress, alert and oriented x3.  PSYCH:  Mood and affect appropriate.  SKIN: Skin color, texture, turgor normal, no rashes or lesions.  HEAD/FACE:  Normocephalic, atraumatic. Cranial nerves grossly intact.  NECK: No pain to palpation over the cervical paraspinous muscles. Spurling Negative. No pain with neck flexion, extension, or lateral flexion.   CV: RRR with palpation of the radial artery.  PULM: No evidence of respiratory difficulty, symmetric chest rise.  GI:  Soft and non-tender.  BACK: Straight leg raising in the supine position is negative to radicular pain. No pain to palpation over the facet joints of the lumbar spine or spinous processes. Normal range of motion without pain reproduction.  EXTREMITIES: Peripheral joint ROM is full and pain free without obvious instability or laxity in all four extremities. No deformities, edema, or skin discoloration. Good capillary refill.  MUSCULOSKELETAL: Shoulder, hip, and knee provocative maneuvers are negative.  There is no pain with palpation over the sacroiliac joints bilaterally.  FABERs test is negative.  FADIRs test is negative.   Bilateral upper and lower extremity strength is normal and symmetric.  No atrophy or tone abnormalities are noted.  NEURO: Bilateral upper and lower extremity coordination and muscle stretch reflexes are physiologic and symmetric.  Plantar response are downgoing. No clonus.   No loss of sensation is noted.  GAIT: normal.    ASSESSMENT: 49 y.o. year old male with pain, consistent with   Lumbar foraminal stenosis       PLAN:     Proceed with procedure as planned.  The above plan and management options were discussed at length with patient. Patient is in agreement with the above and verbalized understanding. It will be communicated with the referring physician via electronic record, fax, or mail.    Jigna Bedolla  06/05/2020

## 2020-06-05 NOTE — DISCHARGE SUMMARY
Discharge Note  Short Stay      SUMMARY     Admit Date: 6/5/2020    Attending Physician: Jigna Bedolla      Discharge Physician: Jigna Bedolla      Discharge Date: 6/5/2020 3:50 PM    Procedure(s) (LRB):  INJECTION, STEROID, EPIDURAL, L2-L3 need consent (N/A)    Final Diagnosis: Lumbar radiculopathy [M54.16]    Disposition: Home or self care    Patient Instructions:   Current Discharge Medication List      CONTINUE these medications which have NOT CHANGED    Details   diazePAM (VALIUM) 5 MG tablet 1 po 30 minutes prior to MRI, may repeat x 1 right before MRI if needed. Will need .  Qty: 2 tablet, Refills: 0    Associated Diagnoses: DDD (degenerative disc disease), lumbosacral; Thoracic and lumbosacral neuritis; Acquired spondylolisthesis; Chronic bilateral low back pain with bilateral sciatica      dicyclomine (BENTYL) 10 MG capsule Take 10 mg by mouth 4 (four) times daily before meals and nightly.      gabapentin (NEURONTIN) 300 MG capsule Take 1 capsule (300 mg total) by mouth 3 (three) times daily. One at bedtime for 7 days, then 2 a day for 7 days and then 3 a day  Qty: 90 capsule, Refills: 2      meloxicam (MOBIC) 15 MG tablet TAKE 1 TABLET (15 MG TOTAL) BY MOUTH ONCE DAILY. TAKE WITH FOOD.  Qty: 30 tablet, Refills: 0    Associated Diagnoses: DDD (degenerative disc disease), lumbosacral; Thoracic and lumbosacral neuritis; Acquired spondylolisthesis; Chronic bilateral low back pain with bilateral sciatica      nortriptyline (PAMELOR) 10 MG capsule Take 1 capsule (10 mg total) by mouth every evening.  Qty: 30 capsule, Refills: 2    Associated Diagnoses: Abdominal pain, unspecified abdominal location      pantoprazole (PROTONIX) 20 MG tablet Take 20 mg by mouth once daily.      phenazopyridine (PYRIDIUM) 200 MG tablet Take 1 tablet (200 mg total) by mouth every 8 (eight) hours as needed for Pain.  Qty: 6 tablet, Refills: 0      tamsulosin (FLOMAX) 0.4 mg Cap Take 1 capsule (0.4 mg total) by mouth once  daily.  Qty: 30 capsule, Refills: 11      traMADol (ULTRAM) 50 mg tablet Take 1 tablet (50 mg total) by mouth every 6 (six) hours as needed for Pain.  Qty: 9 tablet, Refills: 0                 Discharge Diagnosis: Lumbar radiculopathy [M54.16]  Condition on Discharge: Stable with no complications to procedure   Diet on Discharge: Same as before.  Activity: as per instruction sheet.  Discharge to: Home with a responsible adult.  Follow up: 2-4 weeks       Please call my office or pager at [phone] if experienced any weakness or loss of sensation, fever > 101.5, pain uncontrolled with oral medications, persistent nausea/vomiting/or diarrhea, redness or drainage from the incisions, or any other worrisome concerns. If physician on call was not reached or could not communicate with our office for any reason please go to the nearest emergency department

## 2020-06-05 NOTE — OP NOTE
"Patient Name: Lola Stevens Jr.  MRN: [id number]    INFORMED CONSENT: The procedure, risks, benefits and options were discussed with patient. There are no contraindications to the procedure. The patient expressed understanding and agreed to proceed. The personnel performing the procedure was discussed. I verify that I personally obtained Lola's consent prior to the start of the procedure and the signed consent can be found on the patient's chart.    Procedure Date: 06/05/2020    Anesthesia: Topical    Pre Procedure diagnosis: Lumbar radiculopathy [M54.16]  1. Lumbar radiculopathy    2. Chronic pain      Post-Procedure diagnosis: SAME      Moderate Sedation: None      PROCEDURE: L2/L3 INTERLAMINAR EPIDURAL STEROID INJECTION - LUMBAR      DESCRIPTION OF PROCEDURE: The patient was brought to the procedure room.  After performing time out IV access was obtained prior to the procedure. The patient was positioned prone on the fluoroscopy table. Continuous hemodynamic monitoring was initiated including blood pressure, EKG, and pulse oximetry.   The area of the spine was prepped with chlorhexidine three times and draped in a sterile fashion.  Skin anesthesia was achieved using 3 mL of lidocaine 1% over the respective injection site. The L2/L3 interspace was visualized under fluoroscopic imaging. An 20 gauge 3 1/2" Tuohy needle was slowly inserted and advanced using loss of resistance to saline with AP and lateral fluoroscopic imaging for needle guidance. Negative aspiration for blood or CSF was confirmed. Epidural contrast spread was confirmed using 2mL of Omnipaque 300 contrast spreading in the lumbar epidural space.6 mL of Lidocaine 0.5% and 10 mg decadron was injected. The needle was removed and bleeding was nil.  A sterile dressing was applied. Lola was taken back to the recovery room for further observation.     Blood Loss: Nill  Specimen: None    The procedure is considered time sensitive and " medically-necessary given the patient's current clinical condition with moderate to severe pain, and/or significant functional impairment, and /or lack of alternative that present less risk of adverse event such as but not limited to a medication-related adverse event or care needed in the emergency department or hospitalization  due to inadequate pain relief.         Albert Noble MD

## 2020-06-05 NOTE — DISCHARGE INSTRUCTIONS

## 2020-06-08 ENCOUNTER — HOSPITAL ENCOUNTER (EMERGENCY)
Facility: HOSPITAL | Age: 50
Discharge: HOME | End: 2020-06-08
Attending: EMERGENCY MEDICINE
Payer: COMMERCIAL

## 2020-06-08 VITALS
RESPIRATION RATE: 18 BRPM | HEIGHT: 70 IN | TEMPERATURE: 98 F | DIASTOLIC BLOOD PRESSURE: 77 MMHG | HEART RATE: 65 BPM | OXYGEN SATURATION: 100 % | SYSTOLIC BLOOD PRESSURE: 129 MMHG | BODY MASS INDEX: 28.63 KG/M2 | WEIGHT: 200 LBS

## 2020-06-08 DIAGNOSIS — Z87.19 HISTORY OF CHRONIC PANCREATITIS: ICD-10-CM

## 2020-06-08 DIAGNOSIS — R07.9 CHEST PAIN: ICD-10-CM

## 2020-06-08 DIAGNOSIS — R07.89 ATYPICAL CHEST PAIN: Primary | ICD-10-CM

## 2020-06-08 LAB
ALBUMIN SERPL BCP-MCNC: 4 G/DL (ref 3.5–5.2)
ALP SERPL-CCNC: 59 U/L (ref 55–135)
ALT SERPL W/O P-5'-P-CCNC: 26 U/L (ref 10–44)
ANION GAP SERPL CALC-SCNC: 12 MMOL/L (ref 8–16)
AST SERPL-CCNC: 21 U/L (ref 10–40)
BASOPHILS # BLD AUTO: 0.04 K/UL (ref 0–0.2)
BASOPHILS NFR BLD: 0.6 % (ref 0–1.9)
BILIRUB SERPL-MCNC: 0.4 MG/DL (ref 0.1–1)
BILIRUB UR QL STRIP: NEGATIVE
BNP SERPL-MCNC: 39 PG/ML (ref 0–99)
BUN SERPL-MCNC: 18 MG/DL (ref 6–20)
CALCIUM SERPL-MCNC: 9.4 MG/DL (ref 8.7–10.5)
CHLORIDE SERPL-SCNC: 105 MMOL/L (ref 95–110)
CLARITY UR REFRACT.AUTO: CLEAR
CO2 SERPL-SCNC: 20 MMOL/L (ref 23–29)
COLOR UR AUTO: NORMAL
CREAT SERPL-MCNC: 0.9 MG/DL (ref 0.5–1.4)
DIFFERENTIAL METHOD: NORMAL
EOSINOPHIL # BLD AUTO: 0.2 K/UL (ref 0–0.5)
EOSINOPHIL NFR BLD: 2.4 % (ref 0–8)
ERYTHROCYTE [DISTWIDTH] IN BLOOD BY AUTOMATED COUNT: 12.5 % (ref 11.5–14.5)
EST. GFR  (AFRICAN AMERICAN): >60 ML/MIN/1.73 M^2
EST. GFR  (NON AFRICAN AMERICAN): >60 ML/MIN/1.73 M^2
GLUCOSE SERPL-MCNC: 92 MG/DL (ref 70–110)
GLUCOSE UR QL STRIP: NEGATIVE
HCT VFR BLD AUTO: 48.5 % (ref 40–54)
HGB BLD-MCNC: 16 G/DL (ref 14–18)
HGB UR QL STRIP: NEGATIVE
IMM GRANULOCYTES # BLD AUTO: 0.03 K/UL (ref 0–0.04)
IMM GRANULOCYTES NFR BLD AUTO: 0.5 % (ref 0–0.5)
KETONES UR QL STRIP: NEGATIVE
LEUKOCYTE ESTERASE UR QL STRIP: NEGATIVE
LYMPHOCYTES # BLD AUTO: 2.2 K/UL (ref 1–4.8)
LYMPHOCYTES NFR BLD: 33 % (ref 18–48)
MCH RBC QN AUTO: 30.1 PG (ref 27–31)
MCHC RBC AUTO-ENTMCNC: 33 G/DL (ref 32–36)
MCV RBC AUTO: 91 FL (ref 82–98)
MONOCYTES # BLD AUTO: 0.5 K/UL (ref 0.3–1)
MONOCYTES NFR BLD: 7.8 % (ref 4–15)
NEUTROPHILS # BLD AUTO: 3.6 K/UL (ref 1.8–7.7)
NEUTROPHILS NFR BLD: 55.7 % (ref 38–73)
NITRITE UR QL STRIP: NEGATIVE
NRBC BLD-RTO: 0 /100 WBC
PH UR STRIP: 6 [PH] (ref 5–8)
PLATELET # BLD AUTO: 227 K/UL (ref 150–350)
PMV BLD AUTO: 9.7 FL (ref 9.2–12.9)
POTASSIUM SERPL-SCNC: 4.8 MMOL/L (ref 3.5–5.1)
PROT SERPL-MCNC: 7.4 G/DL (ref 6–8.4)
PROT UR QL STRIP: NEGATIVE
RBC # BLD AUTO: 5.31 M/UL (ref 4.6–6.2)
SARS-COV-2 RDRP RESP QL NAA+PROBE: NEGATIVE
SODIUM SERPL-SCNC: 137 MMOL/L (ref 136–145)
SP GR UR STRIP: 1.01 (ref 1–1.03)
TROPONIN I SERPL DL<=0.01 NG/ML-MCNC: <0.006 NG/ML (ref 0–0.03)
TROPONIN I SERPL DL<=0.01 NG/ML-MCNC: <0.006 NG/ML (ref 0–0.03)
URN SPEC COLLECT METH UR: NORMAL
WBC # BLD AUTO: 6.54 K/UL (ref 3.9–12.7)

## 2020-06-08 PROCEDURE — 93005 ELECTROCARDIOGRAM TRACING: CPT

## 2020-06-08 PROCEDURE — 99285 PR EMERGENCY DEPT VISIT,LEVEL V: ICD-10-PCS | Mod: ,,, | Performed by: EMERGENCY MEDICINE

## 2020-06-08 PROCEDURE — 93010 EKG 12-LEAD: ICD-10-PCS | Mod: ,,, | Performed by: INTERNAL MEDICINE

## 2020-06-08 PROCEDURE — 81003 URINALYSIS AUTO W/O SCOPE: CPT

## 2020-06-08 PROCEDURE — 84484 ASSAY OF TROPONIN QUANT: CPT | Mod: 91

## 2020-06-08 PROCEDURE — 99285 EMERGENCY DEPT VISIT HI MDM: CPT | Mod: 25

## 2020-06-08 PROCEDURE — 83880 ASSAY OF NATRIURETIC PEPTIDE: CPT

## 2020-06-08 PROCEDURE — U0002 COVID-19 LAB TEST NON-CDC: HCPCS

## 2020-06-08 PROCEDURE — 80053 COMPREHEN METABOLIC PANEL: CPT

## 2020-06-08 PROCEDURE — 93010 ELECTROCARDIOGRAM REPORT: CPT | Mod: ,,, | Performed by: INTERNAL MEDICINE

## 2020-06-08 PROCEDURE — 25000003 PHARM REV CODE 250: Performed by: EMERGENCY MEDICINE

## 2020-06-08 PROCEDURE — 25500020 PHARM REV CODE 255: Performed by: EMERGENCY MEDICINE

## 2020-06-08 PROCEDURE — 85025 COMPLETE CBC W/AUTO DIFF WBC: CPT

## 2020-06-08 PROCEDURE — 99285 EMERGENCY DEPT VISIT HI MDM: CPT | Mod: ,,, | Performed by: EMERGENCY MEDICINE

## 2020-06-08 RX ORDER — NAPROXEN 375 MG/1
375 TABLET ORAL 2 TIMES DAILY WITH MEALS
Qty: 6 TABLET | Refills: 0 | Status: SHIPPED | OUTPATIENT
Start: 2020-06-08 | End: 2020-06-11

## 2020-06-08 RX ORDER — ONDANSETRON 4 MG/1
4 TABLET, ORALLY DISINTEGRATING ORAL EVERY 6 HOURS PRN
Qty: 10 TABLET | Refills: 0 | Status: SHIPPED | OUTPATIENT
Start: 2020-06-08 | End: 2021-03-03

## 2020-06-08 RX ORDER — ASPIRIN 325 MG
325 TABLET ORAL
Status: COMPLETED | OUTPATIENT
Start: 2020-06-08 | End: 2020-06-08

## 2020-06-08 RX ORDER — BACLOFEN 10 MG/1
20 TABLET ORAL ONCE
Status: COMPLETED | OUTPATIENT
Start: 2020-06-08 | End: 2020-06-08

## 2020-06-08 RX ADMIN — IOHEXOL 75 ML: 350 INJECTION, SOLUTION INTRAVENOUS at 03:06

## 2020-06-08 RX ADMIN — BACLOFEN 20 MG: 10 TABLET ORAL at 12:06

## 2020-06-08 RX ADMIN — ASPIRIN 325 MG ORAL TABLET 325 MG: 325 PILL ORAL at 11:06

## 2020-06-08 NOTE — ED TRIAGE NOTES
Pt presents to ed reports tingling to left side of face for 2 + months and is concerned for high bp. Pt reports mid sternal tightness radiating to the left arm for past few weeks and sob. PT reports sob gets better with movement.        LOC: The patient is awake, alert, and aware of environment. The patient is oriented x 3 and speaking appropriately.   APPEARANCE: No acute distress noted.   PSYCHOSOCIAL: Patient is calm and cooperative.   SKIN: The skin is warm, dry.   RESPIRATORY: Airway is open and patent. Bilateral chest rise and fall. Respirations are spontaneous, even and unlabored. Normal effort and rate noted. No accessory muscle use noted. Pt reports sob.   CARDIAC: mid sternal tightness radiating to left arm  ABDOMEN: Soft and non tender to palpation. No distention noted.   URINARY:  Voids independently.   NEUROLOGIC: Eyes open spontaneously. Speech clear. Tolerating saliva secretions well. Able to follow commands, demonstrating ability to actively and appropriately communicate within context of current conversation. Symmetrical facial muscles. Moving all extremities well. Movement is purposeful.   MUSCULOSKELETAL: No obvious deformities noted.

## 2020-06-08 NOTE — ED PROVIDER NOTES
"Encounter Date: 6/8/2020    SCRIBE #1 NOTE: I, Keri Hooper, am scribing for, and in the presence of,  Zain Bourne MD. I have scribed the entire note.     STAFF ATTENDING PHYSICIAN NOTE:  I provided and agree with the documentation provided by ALEXIS on Lola Stevens Jr..  ____________________  Juan SHIREEN Bourne MD, [facility]  Emergency Medicine Staff  11:12 AM 6/8/2020      History     Chief Complaint   Patient presents with    Multiple Complaints     tingling to L side of face constant for month, sob, cp, L shoulder pain, htn, denies cardiac hx, has neg stress test 1.5 yrs ago, lower abd pain     49 y.o. male with past medical history of GERD, Diverticulosis, chronic prostatitis, who presents to the ED for bilateral, nonradiating and reproducible chest tightness/pain that began a few days ago with associated shortness of breath.  He denies any dyspnea on exertion or chest pain on exertion since symptom onset.  He does report his pain is aggravated with movement and palpation to his chest.  Additionally, he reports several months of tingling to the left side of his face - around his left cheek area and into his left nose. No tingling to his eye. Denies vision changes. Patient reports tingling began after he had gone to a fair where most of his neighborhood had gotten COVID. Patient did not get tested. However, he did have loss of taste and smell. He has not seen anyone for this.  Patient notes left shoulder pain that occurs 3-4 times a day. He reports pain is exacerbated with movement. Patient notes intermittent periods of light headedness, nausea, cough, and congestion over the last few weeks to months. Notes sore throat a few days ago, which resolved spontaneously. He reports urinary frequency for the last 7 years and therefore "old". No other urinary symptoms. Denies family history of heart disease. Denies history of PE. No vomiting, black or bloody stool, or loss of appetite.     The history is provided by " the patient and medical records.     Review of patient's allergies indicates:  No Known Allergies  Past Medical History:   Diagnosis Date    Chronic pain     Diverticulosis     GERD (gastroesophageal reflux disease)     Hemorrhoids     Lumbar radiculopathy      Past Surgical History:   Procedure Laterality Date    CHOLECYSTECTOMY      COLONOSCOPY      COLONOSCOPY N/A 10/17/2018    Procedure: COLONOSCOPY;  Surgeon: Cinda Davey MD;  Location: [facility] ([facility]);  Service: Endoscopy;  Laterality: N/A;    EPIDURAL STEROID INJECTION N/A 2020    Procedure: INJECTION, STEROID, EPIDURAL, L2-L3 need consent;  Surgeon: Albert Noble MD;  Location: [facility], [place], operated by [facility];  Service: Pain Management;  Laterality: N/A;    ESOPHAGOGASTRODUODENOSCOPY      ESOPHAGOGASTRODUODENOSCOPY N/A 10/17/2018    Procedure: EGD (ESOPHAGOGASTRODUODENOSCOPY);  Surgeon: Cinda Davey MD;  [facility]  Service: Endoscopy;  Laterality: N/A;  Patient requested this date for transportation    TRANSFORAMINAL EPIDURAL INJECTION OF STEROID Bilateral 10/25/2019    Procedure: Injection,steroid,epidural,transforaminal approach LUMBAR TRANSFORAMINAL BILATERAL L5 TF ALBIN;  Surgeon: Albert Noble MD;  Location: [facility], [place], operated by [facility];  Service: Pain Management;  Laterality: Bilateral;  NEEDS CONSENT     Family History   Problem Relation Age of Onset    Heart disease Father     Colon cancer Neg Hx     Esophageal cancer Neg Hx     Stomach cancer Neg Hx     Ulcerative colitis Neg Hx     Crohn's disease Neg Hx     Celiac disease Neg Hx     Irritable bowel syndrome Neg Hx      Social History     Tobacco Use    Smoking status: Former Smoker     Packs/day: 1.00     Years: 15.00     Pack years: 15.00     Types: Cigarettes     Last attempt to quit: 10/17/2002     Years since quittin.6    Smokeless tobacco: Former User     Quit date: 2018    Tobacco comment: quit smoking 15 years ago   Substance Use Topics    Alcohol  use: Not Currently     Comment: on weekends    Drug use: No     Review of Systems  CONST: No fever, chills, weight change, or fatigue.  HEENT: No headache, blurry vision/change in vision, sore throat, ear pain, eye pain, otorrhea, rhinorrhea, tooth pain, swelling, or voice changes.  NECK: No pain, masses, trauma, or redness.  HEART: + Chest pain. No palpitations, or diaphoresis.  LUNG: + Shortness of breath + Chest tightness. No orthopnea, or other complaints.  ABDOMEN: + Nausea, but No pain, vomiting, diarrhea, constipation, or flank pain.  : No discharge, dysuria, lesions, rashes, masses, sores. + Frequency (chronic - >7 years).  EXTREMITIES: + Left shoulder pain. FROM with No swelling, redness, injuries/trauma, lesions, sores, weakness, numbness, or tingling.  NEURO: + Paresthesia to left side of his face. No dizziness, weakness, fatigue, tremors, headache, change in vision or disturbances of balance or coordination.   SKIN: No lesions, rashes, trauma or other complaints.    Physical Exam     Initial Vitals [06/08/20 0941]   BP Pulse Resp Temp SpO2   (!) 143/100 71 18 98.4 °F (36.9 °C) 100 %      MAP       --         Physical Exam  GENERAL: Calm; Cooperative; Well-appearing and Non-Toxic; Well-Nourished; NAD.  HEENT: AT/NC; PERRL, EOMI, speaking full sentences with no slurring of speech or drooling/inability to tolerate oral secretions.  NECK: Supple, FROM with no meningismus, no accessory muscle use.   THORAX/BACK: Atraumatic with NTTP. No midline TTP to C/T/LS spine; No CVA tenderness B/L. SLRT NEG. 3 needle rylee in the L3 and L4, non tender to palpation, non erythematous, no induration.   HEART: +HTN;  Regular rate and rhythm, no M/G/T.  LUNGS: No Tachypnea, No Retractions, and CTA B/L with no W/R/R.  ABDOMEN: +BS, Soft, ND, NTTP. No rigidity. No guarding. NEG Singleton's, Rovsing's, or McBurney's point tenderness.  EXTREMITIES: FROM. Strength 5/5. Symmetrical Sensorium and with no deficits. Soft  Comparments.  SKIN: Warm, Dry, No Skin Tears or Rashes.   VASCULAR: 2+ pulses Prox/Dist & Symmetrical with No delay.  NEUROLOGIC: AAOx3; Answering Questions Appropriately;     ED Course   Procedures  Labs Reviewed   COMPREHENSIVE METABOLIC PANEL - Abnormal; Notable for the following components:       Result Value    CO2 20 (*)     All other components within normal limits   CBC W/ AUTO DIFFERENTIAL   TROPONIN I   B-TYPE NATRIURETIC PEPTIDE   SARS-COV-2 RNA AMPLIFICATION, QUAL   TROPONIN I   URINALYSIS, REFLEX TO URINE CULTURE    Narrative:     Preferred Collection Type->Urine, Clean Catch   TROPONIN I     EKG Readings: (Independently Interpreted)   Sinus rhythm at 66 bpm, ventricular axis normal, QRS/QTc within normal limits with no STEMI.     ECG Results          EKG 12-lead (In process)  Result time 06/08/20 09:55:16    In process by Interface, Lab In [facility] (06/08/20 09:55:16)                 Narrative:    Test Reason : R07.9,    Vent. Rate : 066 BPM     Atrial Rate : 066 BPM     P-R Int : 176 ms          QRS Dur : 090 ms      QT Int : 366 ms       P-R-T Axes : 033 -04 002 degrees     QTc Int : 383 ms    Normal sinus rhythm  Cannot rule out Inferior infarct ,age undetermined  Cannot rule out Anterior infarct ,age undetermined  Abnormal ECG  When compared with ECG of 04-MAR-2019 09:39,  Previous ECG has undetermined rhythm, needs review  Minimal criteria for Inferior infarct are now Present  ST no longer elevated in Inferior leads    Referred By: [email or web address]   SELF           Confirmed By:                             Imaging Results          CT Abdomen Pelvis With Contrast (Final result)  Result time 06/08/20 16:11:51    Final result by Lenny Chavarria MD (06/08/20 16:11:51)                 Impression:      The prostate appears normal in size and attenuation. No evidence of focal fluid collections or abnormal enhancement to suggest abscess formation as clinically questioned.    Cholecystectomy.    Additional  stable findings as above.    Electronically signed by resident: Maik Arreaga MD  Date:    06/08/2020  Time:    15:36    Electronically signed by: Lenny Chavarria MD  Date:    06/08/2020  Time:    16:11             Narrative:    EXAMINATION:  CT ABDOMEN PELVIS WITH CONTRAST    CLINICAL HISTORY:  Abd pain, fever, abscess suspected;Hernia, complicated;    TECHNIQUE:  Low dose axial images, sagittal and coronal reformations were obtained from the lung bases to the pubic symphysis following the IV administration of 75 mL of Omnipaque 350 .  Oral contrast was not administered.    COMPARISON:  CT urogram 03/23/2019.  CT abdomen pelvis 09/21/2018.    FINDINGS:  The heart size within normal limits.  No significant pericardial effusion.    - Lung bases: No focal consolidation, pleural effusion or pneumothorax.  3 mm pulmonary nodule within the right lower lobe (series 2, image 2), stable since 09/21/2018.    - Liver: Normal in size and attenuation.  No focal enhancing lesions.    Status post cholecystectomy.  No intrahepatic or extrahepatic biliary ductal dilatation.    The spleen, adrenals, pancreas are unremarkable.    Both kidneys are normal in size and enhancement.  The left ureter is slightly prominent similar to multiple prior exams.  No evidence of hydronephrosis or hydroureter.  No evidence of renal masses.  No nephrolithiasis.    -Retroperitoneum:  No significant adenopathy.    - Vascular: Calcific atherosclerosis of the aorta and its branches.  No aneurysmal dilatation.    - Abdominal wall:  Unremarkable.    Pelvis:    No pelvic mass, adenopathy, or free fluid.    The prostate appears normal in size and attenuation.  No evidence of focal fluid collections or abnormal enhancement to suggest abscess formation.    Bowel/Mesentery:    No evidence of bowel obstruction or inflammation.  The appendix is visualized and appear unremarkable.    Bones:  No acute osseous abnormality and no suspicious lytic or blastic lesion.   Mild L5-S1 degenerative changes.                               X-Ray Chest PA And Lateral (Final result)  Result time 06/08/20 12:13:03    Final result by Jayme Fraire MD (06/08/20 12:13:03)                 Impression:      No acute abnormality.      Electronically signed by: Jayme Fraire MD  Date:    06/08/2020  Time:    12:13             Narrative:    EXAMINATION:  XR CHEST PA AND LATERAL    CLINICAL HISTORY:  Chest Pain;    TECHNIQUE:  PA and lateral views of the chest were performed.    COMPARISON:  Multiple studies dating to chest radiograph/13/18    FINDINGS:  The lungs are clear, with normal appearance of pulmonary vasculature and no pleural effusion or pneumothorax.  Mild scarring at the apex of the lungs.    The cardiac silhouette is normal in size. The hilar and mediastinal contours are unremarkable.    Bones are intact. There are cholecystectomy clips.                                 Medical Decision Making:   History:   Old Medical Records: I decided to obtain old medical records.  Old Records Summarized: records from clinic visits.  Initial Assessment:   Afebrile and hypertensive with reported bilateral chest tightness with no airway or respiratory instability, lungs are clear with no tachypnea, no accessory muscle use, no heart murmur or STEMI on EKG, pain seems to be reproducible to palpation and pleuritic, abdomen is benign. His urinary complaints are chronic and non acute in nature for which he has follow up with urology. Despite history of diverticulitis, no blood or black stool or changes to his bowel habits. I will perform a chest xray, cardiac enzymes and provide analgesic to confirm my lack of suspicion of CAD as his etiology to his complaints. His left shoulder and chest pain seem to be muscular in nature given pain can be reproducible to my palpation in his thoracic movement. No carinal nerve deficit despite is subjective paresthesia to his cranial 5 maxillary region, which is likely  secondary or residual from possible COVID infection. No acute focal deficit that would indicate neurological consult at this time. Will perform urinary analysis given his history of chronic prostatitis but he denies any acute changes to his frequency. No dysuria or any other urinary complaint. No high risk sexual encounter that would indicate STI as etiology of his symptoms since they have been similar and unchanged in the last 7 years.   ____________________  Juan Bourne MD, [facility]  Emergency Medicine Staff  11:17 AM 6/8/2020    F/U:  Patient reports no further chest pain or shortness of breath.  He reports feeling terrific.  Discussed his lab results.  Awaiting 2nd troponin, but I anticipate patient will be going home and follow-up with primary care physician as outpatient.  ____________________  Juan Bourne MD, [facility]  Emergency Medicine Staff  12:58 PM 6/8/2020    F/U:  Patient in no hemodynamic instability.  No airway or respiratory instability.  He reports complete resolution of chest pain and shortness of breath.  His blood pressure has greatly improved while being here in the ED. Discussed extensively need to follow up with primary care physician to better evaluate need for antihypertensive medications on a chronic basis.  However, upon discharging patient, he reports that he is having severe suprapubic pain.  He denies history of nephrolithiasis in only reports his 7 year stretch of chronic prostatitis.  No perianal sensory complaints, scrotal swelling or trauma reported, in no motor or sensory deficits to bilateral lower extremities.  He denies any history of hernias and after significant discussion, he would like CT imaging to assure that there is no prostatic pathology, which I will order.  ____________________  Juan Bourne MD, [facility]  Emergency Medicine Staff  2:26 PM 6/8/2020    F/U:  Patient continues in no acute distress.  Tolerating p.o..  CT with no emergent need of surgical intervention at this  time.  He has follow-up appointment with Urology, GI and primary care physicians in the next week or so.  Height we discussed symptoms warranting ED return, which they understood.  Upon leaving, they requested some medicines to treat nausea in case he develops any the next 24-48 hours, which I reported I would gladly prescribed to his [facility].  ____________________  Juan Bourne MD, [facility]  Emergency Medicine Staff  4:41 PM 6/8/2020    Independently Interpreted Test(s):   I have ordered and independently interpreted EKG Reading(s) - see prior notes  Clinical Tests:   Lab Tests: Ordered and Reviewed  Radiological Study: Ordered and Reviewed  Medical Tests: Ordered and Reviewed            Scribe Attestation:   Scribe #1: I performed the above scribed service and the documentation accurately describes the services I performed. I attest to the accuracy of the note.                      Future Appointments   Date Time Provider Department Center   6/10/2020  4:00 PM Arin Horton MD [facility] GASTRO [facility]   6/24/2020  1:00 PM Albert Noble MD [facility]         Clinical Impression:       ICD-10-CM ICD-9-CM   1. Atypical chest pain R07.89 786.59   2. Chest pain R07.9 786.50   3. History of chronic pancreatitis Z87.19 V12.79         Disposition:   Disposition: Discharged  Condition: Stable     ED Disposition Condition    Discharge Stable        ED Prescriptions     Medication Sig Dispense Start Date End Date Auth. Provider    naproxen (NAPROSYN) 375 MG tablet Take 1 tablet (375 mg total) by mouth 2 (two) times daily with meals. for 3 days 6 tablet 6/8/2020 6/11/2020 Zain Bourne MD        Follow-up Information     Follow up With Specialties Details Why Contact Info    Estiven Worthington MD Family Medicine  If symptoms worsen [address]  [address]  [facility] [id number]  [phone]      [facility] Emergency Medicine Schedule an appointment as soon as  possible for a visit   [facility] [address]  [facility] [address] [phone]                                     Zain Bourne MD  06/08/20 8212

## 2020-06-08 NOTE — DISCHARGE INSTRUCTIONS
X-Ray Chest PA And Lateral (Final result)   Result time 06/08/20 12:13:03   Final result by Jayme Fraire MD (06/08/20 12:13:03)                Impression:      No acute abnormality.      Electronically signed by: Jayme Fraire MD  Date: 06/08/2020  Time: 12:13            Narrative:    EXAMINATION:  XR CHEST PA AND LATERAL    CLINICAL HISTORY:  Chest Pain;    TECHNIQUE:  PA and lateral views of the chest were performed.    COMPARISON:  Multiple studies dating to chest radiograph/13/18    FINDINGS:  The lungs are clear, with normal appearance of pulmonary vasculature and no pleural effusion or pneumothorax.  Mild scarring at the apex of the lungs.    The cardiac silhouette is normal in size. The hilar and mediastinal contours are unremarkable.    Bones are intact. There are cholecystectomy clips.                     Urinalysis, Reflex to Urine Culture Urine, Clean Catch (Final result)    Component (Lab Inquiry)   Collection Time Result Time SPECIMEN UA COLOR U Appearance, UA PH, UR SPEC GRAV Protein, UA SUGAR  UA KETONES U Bilirubin (UA) Occult Blood UA   06/08/20 13:36:00 06/08/20 13:46:00 Urine, Clean Catch Straw Clear 6.0 1.010 Negative  Recommend a 24 hour ur... Negative Negative Negative Negative        CT Abdomen Pelvis With Contrast (Final result)   Result time 06/08/20 16:11:51   Final result by Lenny Chavarria MD (06/08/20 16:11:51)                Impression:      The prostate appears normal in size and attenuation. No evidence of focal fluid collections or abnormal enhancement to suggest abscess formation as clinically questioned.    Cholecystectomy.    Additional stable findings as above.    Electronically signed by resident: Maik Arreaga MD  Date: 06/08/2020  Time: 15:36    Electronically signed by: Lenny Chavarria MD  Date: 06/08/2020  Time: 16:11            Narrative:    EXAMINATION:  CT ABDOMEN PELVIS WITH CONTRAST    CLINICAL HISTORY:  Abd pain, fever, abscess suspected;Hernia,  complicated;    TECHNIQUE:  Low dose axial images, sagittal and coronal reformations were obtained from the lung bases to the pubic symphysis following the IV administration of 75 mL of Omnipaque 350 .  Oral contrast was not administered.    COMPARISON:  CT urogram 03/23/2019.  CT abdomen pelvis 09/21/2018.    FINDINGS:  The heart size within normal limits.  No significant pericardial effusion.    - Lung bases: No focal consolidation, pleural effusion or pneumothorax.  3 mm pulmonary nodule within the right lower lobe (series 2, image 2), stable since 09/21/2018.    - Liver: Normal in size and attenuation.  No focal enhancing lesions.    Status post cholecystectomy.  No intrahepatic or extrahepatic biliary ductal dilatation.    The spleen, adrenals, pancreas are unremarkable.    Both kidneys are normal in size and enhancement.  The left ureter is slightly prominent similar to multiple prior exams.  No evidence of hydronephrosis or hydroureter.  No evidence of renal masses.  No nephrolithiasis.    -Retroperitoneum:  No significant adenopathy.    - Vascular: Calcific atherosclerosis of the aorta and its branches.  No aneurysmal dilatation.    - Abdominal wall:  Unremarkable.    Pelvis:    No pelvic mass, adenopathy, or free fluid.    The prostate appears normal in size and attenuation.  No evidence of focal fluid collections or abnormal enhancement to suggest abscess formation.    Bowel/Mesentery:    No evidence of bowel obstruction or inflammation.  The appendix is visualized and appear unremarkable.    Bones:  No acute osseous abnormality and no suspicious lytic or blastic lesion.  Mild L5-S1 degenerative changes.

## 2020-06-10 ENCOUNTER — OFFICE VISIT (OUTPATIENT)
Dept: GASTROENTEROLOGY | Facility: CLINIC | Age: 50
End: 2020-06-10
Payer: COMMERCIAL

## 2020-06-10 ENCOUNTER — PATIENT MESSAGE (OUTPATIENT)
Dept: PAIN MEDICINE | Facility: CLINIC | Age: 50
End: 2020-06-10

## 2020-06-10 ENCOUNTER — OFFICE VISIT (OUTPATIENT)
Dept: INTERNAL MEDICINE | Facility: CLINIC | Age: 50
End: 2020-06-10
Payer: COMMERCIAL

## 2020-06-10 VITALS
BODY MASS INDEX: 28.31 KG/M2 | WEIGHT: 202.19 LBS | SYSTOLIC BLOOD PRESSURE: 125 MMHG | DIASTOLIC BLOOD PRESSURE: 79 MMHG | HEIGHT: 71 IN | HEART RATE: 92 BPM

## 2020-06-10 VITALS
WEIGHT: 202.63 LBS | BODY MASS INDEX: 29.07 KG/M2 | OXYGEN SATURATION: 98 % | SYSTOLIC BLOOD PRESSURE: 139 MMHG | DIASTOLIC BLOOD PRESSURE: 90 MMHG | HEART RATE: 93 BPM

## 2020-06-10 DIAGNOSIS — R11.0 NAUSEA: ICD-10-CM

## 2020-06-10 DIAGNOSIS — I10 HYPERTENSION, UNSPECIFIED TYPE: ICD-10-CM

## 2020-06-10 DIAGNOSIS — R10.9 ABDOMINAL PAIN, UNSPECIFIED ABDOMINAL LOCATION: Primary | ICD-10-CM

## 2020-06-10 DIAGNOSIS — G89.29 OTHER CHRONIC PAIN: ICD-10-CM

## 2020-06-10 DIAGNOSIS — K21.9 GASTROESOPHAGEAL REFLUX DISEASE, ESOPHAGITIS PRESENCE NOT SPECIFIED: ICD-10-CM

## 2020-06-10 DIAGNOSIS — R53.82 CHRONIC FATIGUE: Primary | ICD-10-CM

## 2020-06-10 PROBLEM — R53.83 FATIGUE: Status: ACTIVE | Noted: 2020-06-10

## 2020-06-10 PROCEDURE — 99999 PR PBB SHADOW E&M-EST. PATIENT-LVL IV: ICD-10-PCS | Mod: PBBFAC,,, | Performed by: STUDENT IN AN ORGANIZED HEALTH CARE EDUCATION/TRAINING PROGRAM

## 2020-06-10 PROCEDURE — 99213 OFFICE O/P EST LOW 20 MIN: CPT | Mod: S$GLB,,, | Performed by: STUDENT IN AN ORGANIZED HEALTH CARE EDUCATION/TRAINING PROGRAM

## 2020-06-10 PROCEDURE — 99204 PR OFFICE/OUTPT VISIT, NEW, LEVL IV, 45-59 MIN: ICD-10-PCS | Mod: S$GLB,,, | Performed by: STUDENT IN AN ORGANIZED HEALTH CARE EDUCATION/TRAINING PROGRAM

## 2020-06-10 PROCEDURE — 99999 PR PBB SHADOW E&M-EST. PATIENT-LVL III: CPT | Mod: PBBFAC,,, | Performed by: STUDENT IN AN ORGANIZED HEALTH CARE EDUCATION/TRAINING PROGRAM

## 2020-06-10 PROCEDURE — 99213 PR OFFICE/OUTPT VISIT, EST, LEVL III, 20-29 MIN: ICD-10-PCS | Mod: S$GLB,,, | Performed by: STUDENT IN AN ORGANIZED HEALTH CARE EDUCATION/TRAINING PROGRAM

## 2020-06-10 PROCEDURE — 99204 OFFICE O/P NEW MOD 45 MIN: CPT | Mod: S$GLB,,, | Performed by: STUDENT IN AN ORGANIZED HEALTH CARE EDUCATION/TRAINING PROGRAM

## 2020-06-10 PROCEDURE — 99999 PR PBB SHADOW E&M-EST. PATIENT-LVL IV: CPT | Mod: PBBFAC,,, | Performed by: STUDENT IN AN ORGANIZED HEALTH CARE EDUCATION/TRAINING PROGRAM

## 2020-06-10 PROCEDURE — 99999 PR PBB SHADOW E&M-EST. PATIENT-LVL III: ICD-10-PCS | Mod: PBBFAC,,, | Performed by: STUDENT IN AN ORGANIZED HEALTH CARE EDUCATION/TRAINING PROGRAM

## 2020-06-10 RX ORDER — GABAPENTIN 300 MG/1
300 CAPSULE ORAL 3 TIMES DAILY
Qty: 90 CAPSULE | Refills: 2 | Status: SHIPPED | OUTPATIENT
Start: 2020-06-10 | End: 2021-01-15

## 2020-06-10 RX ORDER — TRAMADOL HYDROCHLORIDE 50 MG/1
50 TABLET ORAL EVERY 12 HOURS PRN
Qty: 5 TABLET | Refills: 0 | Status: SHIPPED | OUTPATIENT
Start: 2020-06-10 | End: 2021-03-03

## 2020-06-10 RX ORDER — DULOXETIN HYDROCHLORIDE 20 MG/1
20 CAPSULE, DELAYED RELEASE ORAL DAILY
Qty: 30 CAPSULE | Refills: 2 | Status: SHIPPED | OUTPATIENT
Start: 2020-06-10 | End: 2020-10-20 | Stop reason: SDUPTHER

## 2020-06-10 NOTE — PATIENT INSTRUCTIONS
Take Cymbalta and stop taking your Nortriptyline     Follow up with sleep clinic     Keep your Blood pressure log to bring next time in the clinic    Can try Voltaren gel over your chest area to improve your pain

## 2020-06-10 NOTE — PROGRESS NOTES
Lola Stevens Jr.  1970        Subjective     Chief Complaint:    History of Present Illness:  Mr. Lola Stevens Jr. is a 49 y.o. male with past medical history of GERD, Diverticulosis, chronic prostatitis and chronic low back pain who presents to the clinic for ED visit follow up and to establish care.   Used to follow with Dr. Worthington at [facility]. States wants someone in main campus to manage his chronic issues.       Patient presented to the ED on 6/8 with a complain of bilateral, nonradiating and reproducible chest tightness/pain that began a few days prior with associated shortness of breath. It was related to movement of his chest and palpation. No SOB, cough or palpitation. Additionally, he reported several months of tingling to the left side of his face, sparing the eye, that started after he had gone to a fair where most of his neighborhood had gotten COVID. Did not get tested for COVID. He also mentioned chronic urinary frequency for the last 7 years and developed acute suprapubic pain while in the ED with no dysuria or discharge.    CBC, CMP, UA, BNP and Troponin were WNL. ECG and CXR with no acute process and CT abdomen/pelvis with no abnormalities. No prostate abnormalities were noted.   His left shoulder and chest pain was thought to be muscular in nature given reproducible pain. No carinal nerve deficit was noted and his Lt face tingling was thought likely secondary or residual from possible COVID infection.  Patient was discharged with instruction to F/U his Urology     Patient is following with Urology, last seen him in 11/2019. Per note, unremarkable cysto and CTU in 2019. He was started on 10 day course of oxaprozin. Now no urinary symptoms    Patient had Stress test echo in 3/2019: negative for myocardial ischemia. His chest pain in mainly in the anterior chest wall, described as soreness. No radiation. More with palpation.    He follows with GI for abdominal pain and his pain  was thought related to IBS, no further diagnostic studies needed per their note in 1/2020. His next F/U with them is today. Patient reports improvement of his pain after defecation, and has been having constipation and takes Mirolax. His pain is about 9/10 today and states it has been like this intermittently for eight years.     He admits having generalized fatigue for years, not feeling fresh in the morning and having to have frequent naps during the day. Also reports snoring for years, never been assess by Sleep Study. Reports feeling frustrated about every thing that has been going on. Reports increased appetite with some weight gain.     Patient also rise a concerns of high BP readings at home. He checks it about six times a day and reports readings around 143/106. Prior ED visit with /100. Never been on medicine for it. Only has headache when having abdominal pain. No vision changes or palpitation.           Review of Systems   Constitutional: Positive for malaise/fatigue. Negative for chills, diaphoresis, fever and weight loss.   HENT: Positive for congestion. Negative for sinus pain and sore throat.    Eyes: Negative for discharge and redness.   Respiratory: Negative for cough, sputum production, shortness of breath and wheezing.    Cardiovascular: Positive for chest pain. Negative for palpitations, orthopnea, leg swelling and PND.   Gastrointestinal: Positive for abdominal pain, constipation and nausea. Negative for blood in stool, diarrhea, heartburn, melena and vomiting.   Genitourinary: Negative for dysuria, flank pain and hematuria.   Musculoskeletal: Positive for back pain. Negative for falls and myalgias.   Skin: Negative for rash.   Neurological: Positive for dizziness and weakness. Negative for focal weakness and loss of consciousness.   Psychiatric/Behavioral: Negative for depression and substance abuse. The patient is nervous/anxious and has insomnia.         PAST HISTORY:     Past Medical  History:   Diagnosis Date    Chronic pain     Diverticulosis     GERD (gastroesophageal reflux disease)     Hemorrhoids     Lumbar radiculopathy        Past Surgical History:   Procedure Laterality Date    CHOLECYSTECTOMY      COLONOSCOPY      COLONOSCOPY N/A 10/17/2018    Procedure: COLONOSCOPY;  Surgeon: Cinda Davey MD;  Location: [address]);  Service: Endoscopy;  Laterality: N/A;    EPIDURAL STEROID INJECTION N/A 6/5/2020    Procedure: INJECTION, STEROID, EPIDURAL, L2-L3 need consent;  Surgeon: Albert Noble MD;  Location: [facility];  Service: Pain Management;  Laterality: N/A;    ESOPHAGOGASTRODUODENOSCOPY      ESOPHAGOGASTRODUODENOSCOPY N/A 10/17/2018    Procedure: EGD (ESOPHAGOGASTRODUODENOSCOPY);  Surgeon: Cinda Davey MD;  Location: [address]);  Service: Endoscopy;  Laterality: N/A;  Patient requested this date for transportation    TRANSFORAMINAL EPIDURAL INJECTION OF STEROID Bilateral 10/25/2019    Procedure: Injection,steroid,epidural,transforaminal approach LUMBAR TRANSFORAMINAL BILATERAL L5 TF ALBIN;  Surgeon: Albert Noble MD;  Location: [facility];  Service: Pain Management;  Laterality: Bilateral;  NEEDS CONSENT       Family History   Problem Relation Age of Onset    Heart disease Father     Colon cancer Neg Hx     Esophageal cancer Neg Hx     Stomach cancer Neg Hx     Ulcerative colitis Neg Hx     Crohn's disease Neg Hx     Celiac disease Neg Hx     Irritable bowel syndrome Neg Hx        Social History     Socioeconomic History    Marital status:      Spouse name: Not on file    Number of children: Not on file    Years of education: Not on file    Highest education level: Not on file   Occupational History    Not on file   Social Needs    Financial resource strain: Not on file    Food insecurity:     Worry: Not on file     Inability: Not on file    Transportation needs:     Medical: Not on file     Non-medical: Not on file    Tobacco Use    Smoking status: Former Smoker     Packs/day: 1.00     Years: 15.00     Pack years: 15.00     Types: Cigarettes     Last attempt to quit: 10/17/2002     Years since quittin.6    Smokeless tobacco: Former User     Quit date: 2018    Tobacco comment: quit smoking 15 years ago   Substance and Sexual Activity    Alcohol use: Not Currently     Comment: on weekends    Drug use: No    Sexual activity: Yes   Lifestyle    Physical activity:     Days per week: Not on file     Minutes per session: Not on file    Stress: Not at all   Relationships    Social connections:     Talks on phone: Not on file     Gets together: Not on file     Attends Hinduism service: Not on file     Active member of club or organization: Not on file     Attends meetings of clubs or organizations: Not on file     Relationship status: Not on file   Other Topics Concern    Not on file   Social History Narrative    Not on file       MEDICATIONS & ALLERGIES:     Current Outpatient Medications on File Prior to Visit   Medication Sig    diazePAM (VALIUM) 5 MG tablet 1 po 30 minutes prior to MRI, may repeat x 1 right before MRI if needed. Will need .    dicyclomine (BENTYL) 10 MG capsule Take 10 mg by mouth 4 (four) times daily before meals and nightly.    gabapentin (NEURONTIN) 300 MG capsule Take 1 capsule (300 mg total) by mouth 3 (three) times daily. One at bedtime for 7 days, then 2 a day for 7 days and then 3 a day    meloxicam (MOBIC) 15 MG tablet TAKE 1 TABLET (15 MG TOTAL) BY MOUTH ONCE DAILY. TAKE WITH FOOD.    naproxen (NAPROSYN) 375 MG tablet Take 1 tablet (375 mg total) by mouth 2 (two) times daily with meals. for 3 days    nortriptyline (PAMELOR) 10 MG capsule Take 1 capsule (10 mg total) by mouth every evening.    ondansetron (ZOFRAN-ODT) 4 MG TbDL Take 1 tablet (4 mg total) by mouth every 6 (six) hours as needed (Nausea).    pantoprazole (PROTONIX) 20 MG tablet Take 20 mg by mouth once  daily.    phenazopyridine (PYRIDIUM) 200 MG tablet Take 1 tablet (200 mg total) by mouth every 8 (eight) hours as needed for Pain.    tamsulosin (FLOMAX) 0.4 mg Cap Take 1 capsule (0.4 mg total) by mouth once daily.    traMADol (ULTRAM) 50 mg tablet Take 1 tablet (50 mg total) by mouth every 6 (six) hours as needed for Pain.     Current Facility-Administered Medications on File Prior to Visit   Medication    0.9%  NaCl infusion    0.9%  NaCl infusion       Review of patient's allergies indicates:  No Known Allergies    OBJECTIVE:     Vital Signs:  Vitals:    06/10/20 1311 06/10/20 1337   BP: (!) 142/90 (!) 139/90   Pulse: 93    SpO2: 98%    Weight: 91.9 kg (202 lb 9.6 oz)        Body mass index is 29.07 kg/m².     Physical Exam:  Physical Exam   Constitutional: He is oriented to person, place, and time. He appears distressed.   Patient anxious, walking in the rooms when I entered, asked for his wife to come   HENT:   Head: Normocephalic and atraumatic.   Mouth/Throat: No oropharyngeal exudate.   Eyes: Pupils are equal, round, and reactive to light. Conjunctivae and EOM are normal. Right eye exhibits no discharge. Left eye exhibits no discharge.   Neck: Neck supple. No JVD present. No thyromegaly present.   Cardiovascular: Normal rate, regular rhythm and normal heart sounds.   Pulmonary/Chest: Effort normal and breath sounds normal. No respiratory distress. He has no wheezes. He has no rales.   Soreness when palpating Lt side of anterior chest wall   Abdominal: Soft. Bowel sounds are normal. He exhibits no distension and no mass. Tenderness: lower abdomen. There is no rebound and no guarding.   Musculoskeletal: Normal range of motion. He exhibits no edema, tenderness or deformity.   Neurological: He is alert and oriented to person, place, and time. No cranial nerve deficit. Gait normal. Coordination normal.   Skin: Skin is warm and dry. No rash noted. He is not diaphoretic. No erythema.   Psychiatric: Memory  normal.   Anxious and frustrated          Laboratory  Lab Results   Component Value Date    WBC 6.54 06/08/2020    HGB 16.0 06/08/2020    HCT 48.5 06/08/2020    MCV 91 06/08/2020     06/08/2020     Lab Results   Component Value Date    GLU 92 06/08/2020     06/08/2020    K 4.8 06/08/2020     06/08/2020    CO2 20 (L) 06/08/2020    BUN 18 06/08/2020    CREATININE 0.9 06/08/2020    CALCIUM 9.4 06/08/2020    MG 2.7 (H) 03/04/2019     No results found for: INR, PROTIME  Lab Results   Component Value Date    HGBA1C 5.1 03/04/2019     No results for input(s): POCTGLUCOSE in the last 72 hours.        ASSESSMENT & PLAN:   Mr. Lola Stevens Jr. is a 49 y.o. male with past medical history of GERD, Diverticulosis, chronic prostatitis and chronic low back pain who presents to the clinic for ED visit follow up and to establish care.     Patient had ED visit to address his chronic issues of chest pain, abdominal pain and elevated BP. Has been evaluated by GI for his abdominal pain with thoughts of IBS after extensive labs and imagings. Chest pain with reproducible pain and no palpitation, SOB or cough. CXR unremarkable and ECG/Troponin WNL. No urine symptoms at this time to indicate active prostatitis, giving his Hx of prostatitis, CT abdomen/pelvis with no abnormalities. He does have chronic fatigue and anxiety, frustration and constant pain. He has fatigue for years, not feeling fresh in the morning and having to have frequent naps during the day. Also reports snoring for years. HE also has high BP readings at home. He checks it about six times a day and associated with headache.    - Will switch his Nortryptalin to Cymbalta for better control of pain/anxiety associated with it. Also refer to Sleep clinic for possible AZIZA.     Chronic fatigue  -     Ambulatory referral/consult to Sleep Disorders; Future; Expected date: 06/17/2020  -     DULoxetine (CYMBALTA) 20 MG capsule; Take 1 capsule (20 mg total) by  mouth once daily.  Dispense: 30 capsule; Refill: 2    Gastroesophageal reflux disease, esophagitis presence not specified   Other chronic pain  -     DULoxetine (CYMBALTA) 20 MG capsule; Take 1 capsule (20 mg total) by mouth once daily.  Dispense: 30 capsule; Refill: 2  -     gabapentin (NEURONTIN) 300 MG capsule; Take 1 capsule (300 mg total) by mouth 3 (three) times daily. One at bedtime for 7 days, then 2 a day for 7 days and then 3 a day  Dispense: 90 capsule; Refill: 2    High Blood Pressure       -     Asked to bring BP log next time in the clinic       -    Better control of pain and anxiety            RTC in     Xiomara Khan MD  Internal Medicine PGY2  [facility]  [address] [place] [address]  [phone]

## 2020-06-11 ENCOUNTER — TELEPHONE (OUTPATIENT)
Dept: INTERNAL MEDICINE | Facility: CLINIC | Age: 50
End: 2020-06-11

## 2020-06-11 ENCOUNTER — OFFICE VISIT (OUTPATIENT)
Dept: SLEEP MEDICINE | Facility: CLINIC | Age: 50
End: 2020-06-11
Payer: COMMERCIAL

## 2020-06-11 ENCOUNTER — TELEPHONE (OUTPATIENT)
Dept: SLEEP MEDICINE | Facility: CLINIC | Age: 50
End: 2020-06-11

## 2020-06-11 DIAGNOSIS — G47.30 SLEEP APNEA, UNSPECIFIED TYPE: ICD-10-CM

## 2020-06-11 DIAGNOSIS — R06.83 SNORING: ICD-10-CM

## 2020-06-11 DIAGNOSIS — I10 ESSENTIAL HYPERTENSION: Primary | ICD-10-CM

## 2020-06-11 PROCEDURE — 99203 OFFICE O/P NEW LOW 30 MIN: CPT | Mod: 95,,, | Performed by: INTERNAL MEDICINE

## 2020-06-11 PROCEDURE — 99203 PR OFFICE/OUTPT VISIT, NEW, LEVL III, 30-44 MIN: ICD-10-PCS | Mod: 95,,, | Performed by: INTERNAL MEDICINE

## 2020-06-11 NOTE — TELEPHONE ENCOUNTER
----- Message from Oanh Eric sent at 6/11/2020  4:11 PM CDT -----  Contact: pt at [phone]  Xiomara pt-Pt is waiting for a letter to return to work be emailed to [email or web address]     Pt needs asap.  Pt needs to send everything to the company to return to work on Monday Ariela 15, 2020, Please call pt with update.

## 2020-06-11 NOTE — PROGRESS NOTES
Subjective:       Patient ID: Lola Stevens Jr. is a 49 y.o. male.    Chief Complaint: Sleep Apnea    HPI     I had the pleasure of seeing Lola Stevens Jr. today, who is a 49 y.o. male that presents with snoring and elevated blood pressure.    Lola Stevens Jr. does not have a CDL.    Lola Stevens Jr. is not a shift worker.    Lola Stevens Jr. presents with has snoring that has been going on for 5 years    Bedtime when working ranges from 2200 to 2230.   When not working, bedtime ranges from 2300 to 2330.   Sleep latency ranges from 10 to 15 minutes.     Average number of awakenings is 6-8 and return to sleep is quick.   Wake up time when working is 0445 to 0445.   When not working, wake up time is 0800 to 0900.   Patient does not feel rested upon awakening.    Lola Stevens Jr. consumes approximately 2 beverages with caffeine are consumed daily.   An average of 4 beverages with alcohol are consumed weekly   Medications taken for sleep currently: none  Previous medications taken: none     Lola Stevens Jr. does experience daytime sleepiness.   Naps are taken about 0 times weekly, usually lasting NA to NA minutes.  Lola currently does operate an automobile.  Lola Stevens Jr. does not experience drowsiness when driving.   Patient does doze off when sedentary.   Lola Stevens Jr. does not have auxiliary symptoms of narcolepsy including sleep onset paralysis, hypnagogic hallucinations, sleep attacks and cataplexy  ESS 12    Lola Stevens Jr. has a history of snoring.   Snoring is described as moderate and constant.   Apneic episodes have been noticed during sleep.   A witness to sleep is present.   The patient awakens with mouth dryness.      Lola Stevens Jr. does not not have symptoms of Restless Legs Syndrome. Nocturnal leg movements have not been noticed.   The patient does not experience sleep related leg cramps.   There is not a history of  parasomnia.      Current Outpatient Medications:     DULoxetine (CYMBALTA) 20 MG capsule, Take 1 capsule (20 mg total) by mouth once daily., Disp: 30 capsule, Rfl: 2    gabapentin (NEURONTIN) 300 MG capsule, Take 1 capsule (300 mg total) by mouth 3 (three) times daily. One at bedtime for 7 days, then 2 a day for 7 days and then 3 a day, Disp: 90 capsule, Rfl: 2    naproxen (NAPROSYN) 375 MG tablet, Take 1 tablet (375 mg total) by mouth 2 (two) times daily with meals. for 3 days, Disp: 6 tablet, Rfl: 0    ondansetron (ZOFRAN-ODT) 4 MG TbDL, Take 1 tablet (4 mg total) by mouth every 6 (six) hours as needed (Nausea)., Disp: 10 tablet, Rfl: 0    pantoprazole (PROTONIX) 20 MG tablet, Take 20 mg by mouth once daily., Disp: , Rfl:     phenazopyridine (PYRIDIUM) 200 MG tablet, Take 1 tablet (200 mg total) by mouth every 8 (eight) hours as needed for Pain., Disp: 6 tablet, Rfl: 0    traMADoL (ULTRAM) 50 mg tablet, Take 1 tablet (50 mg total) by mouth every 12 (twelve) hours as needed for Pain., Disp: 5 tablet, Rfl: 0  No current facility-administered medications for this visit.     Facility-Administered Medications Ordered in Other Visits:     0.9%  NaCl infusion, 500 mL, Intravenous, Continuous, Albert Noble MD    0.9%  NaCl infusion, 500 mL, Intravenous, Continuous, Albert Noble MD     Review of patient's allergies indicates:  No Known Allergies      Past Medical History:   Diagnosis Date    Chronic pain     Diverticulosis     GERD (gastroesophageal reflux disease)     Hemorrhoids     High blood pressure 6/10/2020    Lumbar radiculopathy        Past Surgical History:   Procedure Laterality Date    CHOLECYSTECTOMY      COLONOSCOPY      COLONOSCOPY N/A 10/17/2018    Procedure: COLONOSCOPY;  Surgeon: Cinda Davey MD;  Location: [address];  Service: Endoscopy;  Laterality: N/A;    EPIDURAL STEROID INJECTION N/A 6/5/2020    Procedure: INJECTION, STEROID, EPIDURAL, L2-L3 need consent;   Surgeon: Albert Noble MD;  Location: [facility];  Service: Pain Management;  Laterality: N/A;    ESOPHAGOGASTRODUODENOSCOPY      ESOPHAGOGASTRODUODENOSCOPY N/A 10/17/2018    Procedure: EGD (ESOPHAGOGASTRODUODENOSCOPY);  Surgeon: Cinda Davey MD;  Location: [facility] (4TH FLR);  Service: Endoscopy;  Laterality: N/A;  Patient requested this date for transportation    TRANSFORAMINAL EPIDURAL INJECTION OF STEROID Bilateral 10/25/2019    Procedure: Injection,steroid,epidural,transforaminal approach LUMBAR TRANSFORAMINAL BILATERAL L5 TF ALBIN;  Surgeon: Albert Noble MD;  Location: [facility]IN [facility];  Service: Pain Management;  Laterality: Bilateral;  NEEDS CONSENT       Family History   Problem Relation Age of Onset    Heart disease Father     Colon cancer Neg Hx     Esophageal cancer Neg Hx     Stomach cancer Neg Hx     Ulcerative colitis Neg Hx     Crohn's disease Neg Hx     Celiac disease Neg Hx     Irritable bowel syndrome Neg Hx        Social History     Socioeconomic History    Marital status:      Spouse name: Not on file    Number of children: Not on file    Years of education: Not on file    Highest education level: Not on file   Occupational History    Not on file   Social Needs    Financial resource strain: Not on file    Food insecurity:     Worry: Not on file     Inability: Not on file    Transportation needs:     Medical: Not on file     Non-medical: Not on file   Tobacco Use    Smoking status: Former Smoker     Packs/day: 1.00     Years: 15.00     Pack years: 15.00     Types: Cigarettes     Last attempt to quit: 10/17/2002     Years since quittin.6    Smokeless tobacco: Former User     Quit date: 2018    Tobacco comment: quit smoking 15 years ago   Substance and Sexual Activity    Alcohol use: Not Currently     Comment: on weekends    Drug use: No    Sexual activity: Yes   Lifestyle    Physical activity:     Days per week: Not on file     Minutes per  session: Not on file    Stress: Not at all   Relationships    Social connections:     Talks on phone: Not on file     Gets together: Not on file     Attends Yazidi service: Not on file     Active member of club or organization: Not on file     Attends meetings of clubs or organizations: Not on file     Relationship status: Not on file   Other Topics Concern    Not on file   Social History Narrative    Not on file           Old medical records.    There were no vitals filed for this visit.           The patient was given open opportunity to ask questions and/or express concerns about treatment plan.   All questions/concerns were discussed.   Driving precautions were provided.     Two patient identifiers used prior to evaluation.    Thank you for referring Lola Stevens Jr. for evaluation.           Review of Systems    Objective:      Physical Exam    Assessment:       1. Essential hypertension    2. Snoring    3. Sleep apnea, unspecified type        Plan:       Due to listed symptoms, a polysomnogram is recommended and ordered.   Description of procedure given to patient.   If significant Obstructive Sleep Apnea (AZIZA) is found during the initial portion of the study, therapy will be initiated with nasal Continuous Positive Airway Pressure (CPAP).   Goals of therapy were discussed, alternative treatments listed and patient agrees to this form of therapy if indicated.   The pathophysiology of AZIZA was discussed.   The effects of AZIZA on patient's co-morbid conditions and the increased morbidity and/or mortality associated with this condition were reviewed.   The patient was given open opportunity to ask questions and/or express concerns about treatment plan.   All questions/concerns were discussed.   Driving precautions were provided.       Thank you for referring Lola Stevens Jr. for evaluation.       The patient location is: home  The chief complaint leading to consultation is: elevated blood  pressure    Visit type: audiovisual    Face to Face time with patient:   31 minutes of total time spent on the encounter, which includes face to face time and non-face to face time preparing to see the patient (eg, review of tests), Obtaining and/or reviewing separately obtained history, Documenting clinical information in the electronic or other health record, Independently interpreting results (not separately reported) and communicating results to the patient/family/caregiver, or Care coordination (not separately reported).         Each patient to whom he or she provides medical services by telemedicine is:  (1) informed of the relationship between the physician and patient and the respective role of any other health care provider with respect to management of the patient; and (2) notified that he or she may decline to receive medical services by telemedicine and may withdraw from such care at any time.    Notes: home sleep study

## 2020-06-11 NOTE — TELEPHONE ENCOUNTER
Spoke with pt and pt stated he didn't receive the paper copy of the prescription. Did you give him a script yesterday?

## 2020-06-11 NOTE — TELEPHONE ENCOUNTER
----- Message from Olga Galvez sent at 6/10/2020  6:04 PM CDT -----  Type:  RX Refill Request    Who Called:  wife    RX Name and Strength:traMADoL (ULTRAM) 50 mg tablet         How is the patient currently taking it? (ex. 1XDay): as need     Is this a 30 day or 90 day RX: 9 pills     Preferred Pharmacy with phone number: [facility]/pharmacy #[id number] - [place], [place] - [address] [phone] (Phone)     Local or Mail Order: local    Ordering Provider: DR Inman    Would the patient rather a call back or a response via [facility]?  call    Best Call Back Number: [phone]    Additional Information:  Call about this was at the Pharmacy

## 2020-06-11 NOTE — PROGRESS NOTES
[facility]    Reason for visit: The primary encounter diagnosis was Abdominal pain, unspecified abdominal location. A diagnosis of Nausea was also pertinent to this visit.  Referring Provider/PCP: Xiomara Mello MD    History of Present Illness:  Lola Stevens Jr. is a 49 y.o. male with a history of chronic abdominal pain who is presenting for follow-up evaluation of lower abdominal pain.    Patient has been followed by Dr. Ramírez and will be following with me in clinic    Patient has been evaluated for abdominal pain that started around 2013. At that time he was evaluated by GI and multiple evaluations with imaging and endoscopy were negative. According to notes at that time pain was more generalized and colicky in nature associated with nausea. He underwent a cholecystectomy at that time but that did not help. Since then he has been trial on many medications including but not limited to PPIs, Bentyl, meloxicam, iberogast, fiber, probiotics, tramadol, and most recently nortriptyline. Patient states that none of the medications helped other than tramadol, but he did not want to stay on it for a long time. Nortriptyline was started around February this year, and he did not notice any difference in his symptoms. PCP today switched him to venlafaxine and gabapentin. Of note his last EGD and colonoscopy were in 10/2018, some mild gastritis and duodenitis were seen but biopsies were consistent with peptic duodenitis. 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 He had a cystoscopy on 4/29 that was unremarkable    Today the patient states that his pain is mainly lower abdomen, it radiates mainly to left flank, but often to the right flank. At times it radiates upward to mid-epigastric area. When severe can cause nausea as well. He states that pain is constant, worse with laying still for a long time, right after urination and after  ejaculation. Pain does not seem to correlate with eating or having a bowel movement. He reports regular bowel movement, although sometime has occasional tenesmus. He denies reflux, dysphagia, changes in weight or appetite.  He does have some bloating after eating, but usually resolves after a while.     PEndoHx:  EGD. (10/2018). Dr. Davey. Indication:Abdominal pain.  Impression:           - Normal esophagus.                        - Erythematous mucosa in the antrum. Biopsied.                        - Duodenitis. Biopsied.                        - Normal second portion of the duodenum.    Colonoscopy. (10/2018) Provider: Dr. Davey. Indication: Abdominal pain. Extent: Terminal Ileum. Preparation:Excellent.  Impression:           - The entire examined colon is normal.                        - The examined portion of the ileum was normal.                        - No specimens collected.    Review of Systems:   Constitutional: no fever, chills or change in weight   Eyes: no visual changes   ENT: no sore throat or dysphagia  Respiratory: no cough or shortness of breath   Cardiovascular: no chest pain or palpitations   Gastrointestinal: as per HPI  Hematologic/Lymphatic: no easy bruising or lymphadenopathy   Musculoskeletal: no arthralgias or myalgias   Neurological: no change in mental status  Behavioral/Psych: no change in mood    Medical History:  Past Medical History:   Diagnosis Date    Chronic pain     Diverticulosis     GERD (gastroesophageal reflux disease)     Hemorrhoids     High blood pressure 6/10/2020    Lumbar radiculopathy        Past Surgical History:   Procedure Laterality Date    CHOLECYSTECTOMY      COLONOSCOPY      COLONOSCOPY N/A 10/17/2018    Procedure: COLONOSCOPY;  Surgeon: Cinda Davey MD;  Location: [address];  Service: Endoscopy;  Laterality: N/A;    EPIDURAL STEROID INJECTION N/A 6/5/2020    Procedure: INJECTION, STEROID, EPIDURAL, L2-L3 need consent;  Surgeon: Albert  MD Real;  Location: [facility] PAIN MGT;  Service: Pain Management;  Laterality: N/A;    ESOPHAGOGASTRODUODENOSCOPY      ESOPHAGOGASTRODUODENOSCOPY N/A 10/17/2018    Procedure: EGD (ESOPHAGOGASTRODUODENOSCOPY);  Surgeon: Cinda Davey MD;  Location: [facility] ENDO (4TH FLR);  Service: Endoscopy;  Laterality: N/A;  Patient requested this date for transportation    TRANSFORAMINAL EPIDURAL INJECTION OF STEROID Bilateral 10/25/2019    Procedure: Injection,steroid,epidural,transforaminal approach LUMBAR TRANSFORAMINAL BILATERAL L5 TF ALBIN;  Surgeon: Albert Noble MD;  Location: [facility] PAIN MGT;  Service: Pain Management;  Laterality: Bilateral;  NEEDS CONSENT       Family History   Problem Relation Age of Onset    Heart disease Father     Colon cancer Neg Hx     Esophageal cancer Neg Hx     Stomach cancer Neg Hx     Ulcerative colitis Neg Hx     Crohn's disease Neg Hx     Celiac disease Neg Hx     Irritable bowel syndrome Neg Hx        Social History     Socioeconomic History    Marital status:      Spouse name: Not on file    Number of children: Not on file    Years of education: Not on file    Highest education level: Not on file   Occupational History    Not on file   Social Needs    Financial resource strain: Not on file    Food insecurity:     Worry: Not on file     Inability: Not on file    Transportation needs:     Medical: Not on file     Non-medical: Not on file   Tobacco Use    Smoking status: Former Smoker     Packs/day: 1.00     Years: 15.00     Pack years: 15.00     Types: Cigarettes     Last attempt to quit: 10/17/2002     Years since quittin.6    Smokeless tobacco: Former User     Quit date: 2018    Tobacco comment: quit smoking 15 years ago   Substance and Sexual Activity    Alcohol use: Not Currently     Comment: on weekends    Drug use: No    Sexual activity: Yes   Lifestyle    Physical activity:     Days per week: Not on file     Minutes per session: Not on file     Stress: Not at all   Relationships    Social connections:     Talks on phone: Not on file     Gets together: Not on file     Attends Yazidism service: Not on file     Active member of club or organization: Not on file     Attends meetings of clubs or organizations: Not on file     Relationship status: Not on file   Other Topics Concern    Not on file   Social History Narrative    Not on file       Current Outpatient Medications on File Prior to Visit   Medication Sig Dispense Refill    diazePAM (VALIUM) 5 MG tablet 1 po 30 minutes prior to MRI, may repeat x 1 right before MRI if needed. Will need . 2 tablet 0    dicyclomine (BENTYL) 10 MG capsule Take 10 mg by mouth 4 (four) times daily before meals and nightly.      meloxicam (MOBIC) 15 MG tablet TAKE 1 TABLET (15 MG TOTAL) BY MOUTH ONCE DAILY. TAKE WITH FOOD. 30 tablet 0    naproxen (NAPROSYN) 375 MG tablet Take 1 tablet (375 mg total) by mouth 2 (two) times daily with meals. for 3 days 6 tablet 0    ondansetron (ZOFRAN-ODT) 4 MG TbDL Take 1 tablet (4 mg total) by mouth every 6 (six) hours as needed (Nausea). 10 tablet 0    pantoprazole (PROTONIX) 20 MG tablet Take 20 mg by mouth once daily.      phenazopyridine (PYRIDIUM) 200 MG tablet Take 1 tablet (200 mg total) by mouth every 8 (eight) hours as needed for Pain. 6 tablet 0    traMADol (ULTRAM) 50 mg tablet Take 1 tablet (50 mg total) by mouth every 6 (six) hours as needed for Pain. 9 tablet 0    tamsulosin (FLOMAX) 0.4 mg Cap Take 1 capsule (0.4 mg total) by mouth once daily. 30 capsule 11     Current Facility-Administered Medications on File Prior to Visit   Medication Dose Route Frequency Provider Last Rate Last Dose    0.9%  NaCl infusion  500 mL Intravenous Continuous Albert Noble MD        0.9%  NaCl infusion  500 mL Intravenous Continuous Albert Noble MD           Review of patient's allergies indicates:  No Known Allergies    Physical Exam:  General: Alert and Oriented  x3, no distress   Vitals:    06/10/20 1559   BP: 125/79   Pulse: 92     HEENT: Normocephalic, Atraumatic. No scleral icterus.  Lymph: No cervical lymphadenopathy  Resp: Good air entry bilaterally, no adventitious sounds.  Cardiac: S1 and S2 normal.  Abdomen: Normoactive bowel sounds. Non-distended. Normal tympany. Soft. Tender to palpation lower abdomen just above the symphysis pubis. No peritoneal signs.  Extremities: No peripheral edema. Normal bilateral pedal and radial pulses.  Neurologic: No gross neurological Deficits  Psych: Calm, cooperative. Normal mood and affect.    Laboratory:  Lab Results   Component Value Date     06/08/2020    K 4.8 06/08/2020     06/08/2020    CO2 20 (L) 06/08/2020    BUN 18 06/08/2020    CREATININE 0.9 06/08/2020    CALCIUM 9.4 06/08/2020    ANIONGAP 12 06/08/2020    ESTGFRAFRICA >60.0 06/08/2020    EGFRNONAA >60.0 06/08/2020       Lab Results   Component Value Date    ALT 26 06/08/2020    AST 21 06/08/2020    ALKPHOS 59 06/08/2020    BILITOT 0.4 06/08/2020       Lab Results   Component Value Date    WBC 6.54 06/08/2020    HGB 16.0 06/08/2020    HCT 48.5 06/08/2020    MCV 91 06/08/2020     06/08/2020       Microbiology:  No Pertinent Microbiology    Imaging:  as per HPI.    Assessment:  Lola Stevens Jr. is a 49 y.o. male who is presenting for follow-up evaluation of lower abdominal pain.    The patient's description of symptoms is highly unlikely due to underlying GI pathology, and seems more consistent with genitourinary etiology. Further evaluation and treatments of a GI etiology is unlikely to help with the patient's symptoms. Chronic interstitial cystitis can be a possible cause, which is difficult to diagnose. Recommend follow up with urology, although we will continue to follow in clinc    Plan:  1. Continue venlafaxine, low threshold to discontinue if not helping with pain in the next 2-3 months  2. PPI only if continues to use NSAIDs, can  discontinue otherwise  3. Urology follow up. Patient requesting second opinion  4. A prescription for tramadol was provided. Chronic pain management as recommended by PCP  5. CRC Screenin  Follow up in about 3 months (around 9/10/2020).    Arin Horton MD  Gastroenterology Fellow  Phone: [phone]    No orders of the defined types were placed in this encounter.

## 2020-06-11 NOTE — TELEPHONE ENCOUNTER
----- Message from Juju Paris sent at 6/11/2020  9:30 AM CDT -----  Contact: Patient [phone]  Xiomara Mello MD    Patient is requesting a return to work letter for his job. Please e-mail to [email or web address].    Please call and advise.    Thank You

## 2020-06-12 ENCOUNTER — TELEPHONE (OUTPATIENT)
Dept: GASTROENTEROLOGY | Facility: CLINIC | Age: 50
End: 2020-06-12

## 2020-06-12 ENCOUNTER — TELEPHONE (OUTPATIENT)
Dept: INTERNAL MEDICINE | Facility: CLINIC | Age: 50
End: 2020-06-12

## 2020-06-12 NOTE — TELEPHONE ENCOUNTER
----- Message from Ludmila Goldstein sent at 6/12/2020  7:15 AM CDT -----  Contact: wife/swapnil/[phone]  Pt wife called in regards to checking the status of a return back to work letter. He is returning back to work on Monday. They would like a call back ASAP. This is the pt 3 message. Pt sees Xiomara Mello MD    Please advise

## 2020-06-12 NOTE — TELEPHONE ENCOUNTER
Spoke with pt and wife and they stated pt needs a release to work letter for Monday. Please advise. I informed pt that Dr. Horton is not in clinic today so there is no guarantee that the letter will be ready before Monday. Wife verbalized understanding.

## 2020-06-12 NOTE — TELEPHONE ENCOUNTER
When he was in clinic he told me the reason they told him he cannot go back to work was his high blood pressure. I cannot clear him to return to work from BP standpoint as I am not managing that.

## 2020-06-12 NOTE — TELEPHONE ENCOUNTER
Spoke with pt's wife and informed of message below. Pt's wife verbalized understanding but was frustrated that PCP hasn't responded back.

## 2020-06-16 ENCOUNTER — TELEPHONE (OUTPATIENT)
Dept: HEPATOLOGY | Facility: HOSPITAL | Age: 50
End: 2020-06-16

## 2020-06-16 ENCOUNTER — TELEPHONE (OUTPATIENT)
Dept: SLEEP MEDICINE | Facility: OTHER | Age: 50
End: 2020-06-16

## 2020-06-18 ENCOUNTER — NURSE TRIAGE (OUTPATIENT)
Dept: ADMINISTRATIVE | Facility: CLINIC | Age: 50
End: 2020-06-18

## 2020-06-18 NOTE — PROGRESS NOTES
I have reviewed the notes, assessments, and/or procedures performed by Dr. Mello, I concur with the documentation of Lola Stevens Jr..

## 2020-06-19 ENCOUNTER — LAB VISIT (OUTPATIENT)
Dept: LAB | Facility: HOSPITAL | Age: 50
End: 2020-06-19
Attending: UROLOGY
Payer: COMMERCIAL

## 2020-06-19 ENCOUNTER — OFFICE VISIT (OUTPATIENT)
Dept: UROLOGY | Facility: CLINIC | Age: 50
End: 2020-06-19
Payer: COMMERCIAL

## 2020-06-19 VITALS
DIASTOLIC BLOOD PRESSURE: 91 MMHG | HEIGHT: 70 IN | HEART RATE: 82 BPM | BODY MASS INDEX: 28.44 KG/M2 | WEIGHT: 198.63 LBS | SYSTOLIC BLOOD PRESSURE: 159 MMHG

## 2020-06-19 DIAGNOSIS — R39.9 LOWER URINARY TRACT SYMPTOMS (LUTS): ICD-10-CM

## 2020-06-19 DIAGNOSIS — R39.198 SLOW URINARY STREAM: ICD-10-CM

## 2020-06-19 DIAGNOSIS — R10.2 PELVIC PAIN IN MALE: Primary | ICD-10-CM

## 2020-06-19 DIAGNOSIS — R10.2 PELVIC PAIN IN MALE: ICD-10-CM

## 2020-06-19 LAB — COMPLEXED PSA SERPL-MCNC: 0.68 NG/ML (ref 0–4)

## 2020-06-19 PROCEDURE — 99215 OFFICE O/P EST HI 40 MIN: CPT | Mod: S$GLB,,, | Performed by: UROLOGY

## 2020-06-19 PROCEDURE — 99999 PR PBB SHADOW E&M-EST. PATIENT-LVL III: ICD-10-PCS | Mod: PBBFAC,,, | Performed by: UROLOGY

## 2020-06-19 PROCEDURE — 99999 PR PBB SHADOW E&M-EST. PATIENT-LVL III: CPT | Mod: PBBFAC,,, | Performed by: UROLOGY

## 2020-06-19 PROCEDURE — 51798 US URINE CAPACITY MEASURE: CPT | Mod: S$GLB,,, | Performed by: UROLOGY

## 2020-06-19 PROCEDURE — 84153 ASSAY OF PSA TOTAL: CPT

## 2020-06-19 PROCEDURE — 51798 PR MEAS,POST-VOID RES,US,NON-IMAGING: ICD-10-PCS | Mod: S$GLB,,, | Performed by: UROLOGY

## 2020-06-19 PROCEDURE — 99215 PR OFFICE/OUTPT VISIT, EST, LEVL V, 40-54 MIN: ICD-10-PCS | Mod: S$GLB,,, | Performed by: UROLOGY

## 2020-06-19 PROCEDURE — 36415 COLL VENOUS BLD VENIPUNCTURE: CPT

## 2020-06-19 RX ORDER — TAMSULOSIN HYDROCHLORIDE 0.4 MG/1
0.4 CAPSULE ORAL DAILY
Qty: 30 CAPSULE | Refills: 11 | Status: SHIPPED | OUTPATIENT
Start: 2020-06-19 | End: 2021-05-25

## 2020-06-19 NOTE — PROGRESS NOTES
CHIEF COMPLAINT:    Mr. Stevens is a 49 y.o. male presenting for a consultation at the request of Dr. Be Durant. Patient presents with LUTS and suprapubic tenderness.    PRESENTING ILLNESS:    Lola Stevens Jr. is a 49 y.o. male who is new to me, has been seen in the department for prostatitis in the past.  The patient is accompanied by his wife and both give his history.  He has a several year history of abdominal pain, sometimes it is suprapubic and sometimes it is in the LLQ.  He has had a workup for diverticulosis, GERD.   He also states that there are times in which he is constipated for a few days, then he has copious bowel movements and the pain is better.   However, he continues to have suprapubic pain and this was felt to be secondary to his bladder.  The pain is so bad, that he has to draw up his knees and his wife states that it is severe in nature.  He is on gabapentin, has been on a tricyclic antidepressant for pain.  He has a history of lumbar radiculopathy and his pain improved after his epidural injection, but he could not have a bowel movement for several days.  He has seen Suman Hines several times and was diagnosed with non bacterial prostatitis.  Was on tamsulosin but the patient has been out for several weeks.  No family history of prostate cancer.      Urinary symptoms:  States he has a weak stream, particularly in the morning.  He feels like he does not empty his bladder but does not double void.  He has nocturia x 1.  He states that not drinking beer helps him with the pain.  He also states that ejaculation causes a pulling on the perinuem.        REVIEW OF SYSTEMS:    Review of Systems   Constitutional: Negative.    HENT: Negative.    Eyes: Negative.    Respiratory: Negative.    Cardiovascular: Negative.    Gastrointestinal: Positive for abdominal pain and constipation.   Genitourinary: Positive for urgency.   Musculoskeletal: Positive for back pain.   Skin: Negative.    Neurological:  Negative.    Endo/Heme/Allergies: Negative.    Psychiatric/Behavioral: Negative.        PATIENT HISTORY:    Past Medical History:   Diagnosis Date    Chronic pain     Diverticulosis     GERD (gastroesophageal reflux disease)     Hemorrhoids     High blood pressure 6/10/2020    Lumbar radiculopathy        Past Surgical History:   Procedure Laterality Date    CHOLECYSTECTOMY      COLONOSCOPY      COLONOSCOPY N/A 10/17/2018    Procedure: COLONOSCOPY;  Surgeon: Cinda Davey MD;  [address]  Service: Endoscopy;  Laterality: N/A;    EPIDURAL STEROID INJECTION N/A 2020    Procedure: INJECTION, STEROID, EPIDURAL, L2-L3 need consent;  Surgeon: Albert Noble MD;  Location: [facility] [place]GT;  Service: Pain Management;  Laterality: N/A;    ESOPHAGOGASTRODUODENOSCOPY      ESOPHAGOGASTRODUODENOSCOPY N/A 10/17/2018    Procedure: EGD (ESOPHAGOGASTRODUODENOSCOPY);  Surgeon: Cinda Davey MD;  Location: [address]);  Service: Endoscopy;  Laterality: N/A;  Patient requested this date for transportation    TRANSFORAMINAL EPIDURAL INJECTION OF STEROID Bilateral 10/25/2019    Procedure: Injection,steroid,epidural,transforaminal approach LUMBAR TRANSFORAMINAL BILATERAL L5 TF ALBIN;  Surgeon: Albert Noble MD;  Location: [facility];  Service: Pain Management;  Laterality: Bilateral;  NEEDS CONSENT       Family History   Problem Relation Age of Onset    Heart disease Father      Socioeconomic History    Marital status:    Tobacco Use    Smoking status: Former Smoker     Packs/day: 1.00     Years: 15.00     Pack years: 15.00     Types: Cigarettes     Quit date: 10/17/2002     Years since quittin.6    Smokeless tobacco: Former User     Quit date: 2018    Tobacco comment: quit smoking 15 years ago   Substance and Sexual Activity    Alcohol use: Not Currently     Comment: on weekends    Drug use: No    Sexual activity: Yes     Allergies:  Patient has no known  allergies.    Medications:  Outpatient Encounter Medications as of 6/19/2020   Medication Sig Dispense Refill    DULoxetine (CYMBALTA) 20 MG capsule Take 1 capsule (20 mg total) by mouth once daily. 30 capsule 2    gabapentin (NEURONTIN) 300 MG capsule Take 1 capsule (300 mg total) by mouth 3 (three) times daily. One at bedtime for 7 days, then 2 a day for 7 days and then 3 a day 90 capsule 2    ondansetron (ZOFRAN-ODT) 4 MG TbDL Take 1 tablet (4 mg total) by mouth every 6 (six) hours as needed (Nausea). 10 tablet 0    pantoprazole (PROTONIX) 20 MG tablet Take 20 mg by mouth once daily.      phenazopyridine (PYRIDIUM) 200 MG tablet Take 1 tablet (200 mg total) by mouth every 8 (eight) hours as needed for Pain. 6 tablet 0    tamsulosin (FLOMAX) 0.4 mg Cap Take 1 capsule (0.4 mg total) by mouth once daily. Take first capsule at bedtime.  Thereafter, take with evening meal. 30 capsule 11    traMADoL (ULTRAM) 50 mg tablet Take 1 tablet (50 mg total) by mouth every 12 (twelve) hours as needed for Pain. 5 tablet 0     Facility-Administered Encounter Medications as of 6/19/2020   Medication Dose Route Frequency Provider Last Rate Last Dose    0.9%  NaCl infusion  500 mL Intravenous Continuous Albert Noble MD        0.9%  NaCl infusion  500 mL Intravenous Continuous Albert Noble MD             PHYSICAL EXAMINATION:    The patient generally appears in good health, is appropriately interactive, and is in no apparent distress.    Skin: No lesions.    Mental: Cooperative with normal affect.    Neuro: Grossly intact.    HEENT: Normal. No evidence of lymphadenopathy.    Chest:  normal inspiratory effort.    Abdomen: Soft, non-tender. No masses or organomegaly. Bladder is not palpable. No evidence of flank discomfort. No evidence of inguinal hernia.    Extremities: No clubbing, cyanosis, or edema    Scrotum showed no rashes or lesions. Testicles showed no masses or tenderness.  Epididymis showed no masses or  tenderness.  Penis was circumcised. No meatal stenosis. No penile discharge.  No inguinal hernias.  No inguinal lymphadenopathy.    MALIA:  30 g prostate without nodularity.  Symmetric.  Normal rectal tone, no anal masses.   Random bladder volume by bladder scan 176 ml    LABS:    Lab Results   Component Value Date    BUN 18 06/08/2020    CREATININE 0.9 06/08/2020     Could not void for the UA    IMPRESSION:    Encounter Diagnoses   Name Primary?    Pelvic pain in male Yes    Slow urinary stream     Lower urinary tract symptoms (LUTS)        PLAN:    1.  PSA  2.  Refilled the Flomax.  Take the 1st dose before bedtime discussed 1st dose syncope  3.  FUDS    Copy to:  Dr. Durant.

## 2020-06-19 NOTE — PATIENT INSTRUCTIONS
Urodynamics Studies     The bladder holds urine until it leaves the body through the urethra.     Urodynamics studies are a series of tests that give your doctor a close look at the working of your bladder and urethra. The tests can help your doctor learn about any problems storing urine or voiding (eliminating) urine from your body.  Understanding the lower urinary tract  The lower part of the urinary tract has several parts.  · The bladder stores urine until youre ready to release it.  · The urethra is the tube that carries urine from the bladder out of the body.  · The sphincter is made up of muscles around the opening of the bladder. The sphincter muscles tighten to hold urine in the bladder. They relax to let urine flow. Signals from the brain tell the sphincter when to tighten and relax. These signals also tell the bladder when to contract to let urine flow out of the body.  Why you need a urodynamics study  This test may be ordered if you:  · Are incontinent (leak urine)  · Have a bladder that does not empty all the way.  · Have symptoms such as the need to urinate often or a constant strong need to urinate  · Have intermittent or weak urine stream  · Have persistent urinary tract infections  Preparing for the study  · Tell your doctor about any medicine youre taking. Ask if you should stop them before the study.  · Keep a diary of your bathroom habits. Do this for a few days before the study. This diary can be a helpful part of the evaluation.  · Ask if you need to arrive for the study with a full bladder.  Date Last Reviewed: 1/1/2017  © 2654-5607 [employer]. [address] [place] [address], [place] [address]. All rights reserved. This information is not intended as a substitute for professional medical care. Always follow your healthcare professional's instructions.        Urodynamic Studies     The equipment used for the study varies depending upon the facility and what tests are done.      Urodynamic studies may be done in your doctors office, a clinic, or a hospital. The studies may take up to an hour or more. This depends on which tests your doctor does. The tests are generally painless. You wont need sedating medicine.  Tests that may be done  Uroflowmetry. This measures the amount and speed of urine you void from your bladder. You urinate into a funnel. Its attached to a computer that records your urine flow over time. The amount of urine left in your bladder after you void may also be measured right after this test.  Cystometry. This test evaluates how much your bladder can hold. It also measures how strong your bladder muscle is and how well the signals work that tell you when your bladder is full. Your healthcare provider fills your bladder with sterile water or saline solution, through a catheter. Your doctor will instruct you to report any sensations you feel. Mention if theyre similar to symptoms youve felt at home. Your doctor may ask you to cough, stand and walk, or bear down during this test.  Electromyogram. This helps evaluate the muscle contractions that control urination, such as sphincter muscle contractions. Your healthcare provider may place electrode patches or wires near your rectum or urethra to make the recording. He or she may ask you to try to tighten or relax your sphincter muscles during this test.  Pressure flow study. This test measures your detrusor, urethral, and abdominal pressures. Detrusor is the muscle surrounding the bladder walls that relaxes to allow your bladder to fill, and and contracts to squeeze out urine. A pressure flow study is often done after cystometry. Youre asked to urinate while a probe in your urethra measures pressures.  Video cystourethrography. This takes video pictures of urine flow through your urinary tract. It can help identify blockages or other problems. The bladder is filled with an X-ray contrast fluid. Then X-ray video  pictures are taken as the fluid is urinated out. Ultrasound imaging may also be combined with routine urodynamic studies.  Ambulatory urodynamics. This test can be used to evaluate you while doing usual activities.  Getting your results  After the study, youll get dressed and return to the consultation room. Test results may be ready soon after the study is finished. Or, you may return to your doctors office in a few days for your results. Your doctor can talk with you about the study report and your options.   Date Last Reviewed: 1/1/2017  © 1830-6101 [employer]. [address] [place] [address]. All rights reserved. This information is not intended as a substitute for professional medical care. Always follow your healthcare professional's instructions.

## 2020-06-25 ENCOUNTER — TELEPHONE (OUTPATIENT)
Dept: SLEEP MEDICINE | Facility: OTHER | Age: 50
End: 2020-06-25

## 2020-06-26 ENCOUNTER — HOSPITAL ENCOUNTER (OUTPATIENT)
Dept: SLEEP MEDICINE | Facility: OTHER | Age: 50
Discharge: HOME | End: 2020-06-26
Attending: INTERNAL MEDICINE
Payer: COMMERCIAL

## 2020-06-26 DIAGNOSIS — G47.30 SLEEP APNEA, UNSPECIFIED TYPE: ICD-10-CM

## 2020-06-26 DIAGNOSIS — G47.33 OSA (OBSTRUCTIVE SLEEP APNEA): Primary | ICD-10-CM

## 2020-06-26 DIAGNOSIS — I10 ESSENTIAL HYPERTENSION: ICD-10-CM

## 2020-06-26 DIAGNOSIS — R06.83 SNORING: ICD-10-CM

## 2020-06-26 PROCEDURE — 95800 SLP STDY UNATTENDED: CPT | Mod: 26,,, | Performed by: INTERNAL MEDICINE

## 2020-06-26 PROCEDURE — 95800 SLP STDY UNATTENDED: CPT

## 2020-06-26 PROCEDURE — 95800 PR SLEEP STUDY, UNATTENDED, RECORD HEART RATE/O2 SAT/RESP ANAL/SLEEP TIME: ICD-10-PCS | Mod: 26,,, | Performed by: INTERNAL MEDICINE

## 2020-06-30 NOTE — PROCEDURES
"The sleep study that you ordered is complete.  You have ordered sleep LAB services to perform the sleep study for Lola Stevens Jr.     Please find Sleep Study result in  the "Media tab" of Chart Review menu.     Patient is already established with a Sleep Medicine provider        For any questions, please contact our clinic staff at [phone] to talk to clinical staff.     "

## 2020-07-08 ENCOUNTER — TELEPHONE (OUTPATIENT)
Dept: UROLOGY | Facility: CLINIC | Age: 50
End: 2020-07-08

## 2020-07-08 DIAGNOSIS — R39.198 SLOW URINARY STREAM: Primary | ICD-10-CM

## 2020-07-27 ENCOUNTER — TELEPHONE (OUTPATIENT)
Dept: UROLOGY | Facility: CLINIC | Age: 50
End: 2020-07-27

## 2020-07-27 NOTE — TELEPHONE ENCOUNTER
Called pt to confirm arrival time of 1:00 for procedure on 7/28/2020. Gave pt NPO instructions and gave pt opportunity to ask questions. Pt verbalized understanding.

## 2020-07-28 ENCOUNTER — TELEPHONE (OUTPATIENT)
Dept: UROLOGY | Facility: HOSPITAL | Age: 50
End: 2020-07-28

## 2020-07-28 ENCOUNTER — HOSPITAL ENCOUNTER (OUTPATIENT)
Facility: HOSPITAL | Age: 50
Discharge: HOME | End: 2020-07-28
Attending: UROLOGY | Admitting: UROLOGY
Payer: COMMERCIAL

## 2020-07-28 VITALS
TEMPERATURE: 98 F | SYSTOLIC BLOOD PRESSURE: 157 MMHG | RESPIRATION RATE: 16 BRPM | OXYGEN SATURATION: 97 % | BODY MASS INDEX: 29.35 KG/M2 | DIASTOLIC BLOOD PRESSURE: 97 MMHG | HEIGHT: 70 IN | HEART RATE: 16 BPM | WEIGHT: 205 LBS

## 2020-07-28 DIAGNOSIS — N32.9 BLADDER DISORDER: ICD-10-CM

## 2020-07-28 PROCEDURE — 51784 ANAL/URINARY MUSCLE STUDY: CPT | Mod: 26,,, | Performed by: UROLOGY

## 2020-07-28 PROCEDURE — 36000707: Performed by: UROLOGY

## 2020-07-28 PROCEDURE — 51728 CYSTOMETROGRAM W/VP: CPT | Mod: 26,,, | Performed by: UROLOGY

## 2020-07-28 PROCEDURE — 27200971 HC CYSTO SUPPLY II (SCOPE PRCDR.): Performed by: UROLOGY

## 2020-07-28 PROCEDURE — 36000706: Performed by: UROLOGY

## 2020-07-28 PROCEDURE — 52000 CYSTOURETHROSCOPY: CPT | Mod: 59,,, | Performed by: UROLOGY

## 2020-07-28 PROCEDURE — 51797 PR VOIDING PRESS STUDY INTRA-ABDOMINAL VOID: ICD-10-PCS | Mod: 26,,, | Performed by: UROLOGY

## 2020-07-28 PROCEDURE — 76000 PR  FLUOROSCOPE EXAMINATION: ICD-10-PCS | Mod: 26,59,, | Performed by: UROLOGY

## 2020-07-28 PROCEDURE — 51797 INTRAABDOMINAL PRESSURE TEST: CPT | Mod: 26,,, | Performed by: UROLOGY

## 2020-07-28 PROCEDURE — 76000 FLUOROSCOPY <1 HR PHYS/QHP: CPT | Mod: 26,59,, | Performed by: UROLOGY

## 2020-07-28 PROCEDURE — 27200973 HC CYSTO SUPPLY IV (URODYNAMICS): Performed by: UROLOGY

## 2020-07-28 PROCEDURE — 52000 PR CYSTOURETHROSCOPY: ICD-10-PCS | Mod: 59,,, | Performed by: UROLOGY

## 2020-07-28 PROCEDURE — 51784 PR ANAL/URINARY MUSCLE STUDY: ICD-10-PCS | Mod: 26,,, | Performed by: UROLOGY

## 2020-07-28 PROCEDURE — 51728 PR COMPLEX CYSTOMETROGRAM VOIDING PRESSURE STUDIES: ICD-10-PCS | Mod: 26,,, | Performed by: UROLOGY

## 2020-07-28 RX ORDER — CETIRIZINE HYDROCHLORIDE 10 MG/1
10 TABLET ORAL DAILY
COMMUNITY
End: 2021-09-16

## 2020-07-28 NOTE — OP NOTE
Fluoro Urodynamic Report    Indication:  Weak urinary stream, sensation of incomplete bladder emptying, history of lower back pain    Patient was taken to the Urodynamic Suite with a comfortably full bladder and asked to perform a free uroflow.  Next, the patient was prepped and the urinary residual was drained with a 14 Fr catheter.  A 7 Fr dual lumen catheter was placed to measure intravesical pressures.  A 10 Fr balloon manometer was placed into the rectum for abdominal pressure measurements.  Patch EMG electrodes were placed on the perineum.  The patient was connected to the [employer] Urodynamic machine, using a multichannel technique.  The bladder was filled with Cysto ConRay at room temperature at a rate of 30 ml/min.  Patient is filled to urgency.  Filling is performed with the patient in the seated position.  The patient was then asked to sit and void for a pressure flow study.    The following are the results of the study:  1.  Uroflow       Q max:  Could not void    2.  Amount in the bladder:  225 ml    3.  CMG       Sensation:         First Desire:  178.4 ml         Normal Desire:  250 ml         Strong Desire:  366.9 ml         Urgency:  594 ml       Capacity:  594 +       Abnormal Contractions:  none       Compliance:  normal    4.  EMG:  Normal guarding, relaxed with voiding    5.  Voiding phase  The catheter fell out the first set so a new catheter was inserted and he was fast filled for the following pressure flow study.         Q max:  16.2 ml/sec       P det at Q max:  16.6 cm H2O       Pattern of the curve:  Prolonged and attenuated       Voided volume:  611.1 ml       PVR:  0 ml    6.  Fluoroscopy:   Bladder was smooth, bladder neck opens appropriately    7.  Analysis:  Normal sensation, bladder capacity, variable ability to empty, hypotonic bladder    8.  Recommendations:       A.   Discussed sacral neuromodulation with [employer] (MRI compatible, rechargeable)  And [employer], (soon to have MRI  compatible and rechargeable or non rechargeable options.)       B.  He states the tamsulosin is helping his stream somewhat       C.  He is also doing exercises for his back which is helping with the pain.  Will continue conservative therapy and follow up in 3 months.     CYSTOSCOPY REPORT    Pre Procedure Diagnosis:  LUTS, suprapubic tenderness    [facility]ure Diagnosis:  Normal lower urinary tract.  Bladder neck is high.  Urethra is normal    Anesthesia: 10 cc 2% lidocaine jelly applied per urethra.    14 FR Flexible Olympus cystoscope used.    FINDINGS:  Dome, anterior, posterior, lateral walls and bladder base free of urothelial abnormalities. Right and left ureteral orifices in the normal postion and configuration, both effluxed clear urine.  Bladder neck and urethra were normal.  SVML 2.5 cm.  High bladder neck, otherwise, normal.    Specimen:  none    The patient was taken to the cystoscopy suite and placed in supine position.  The genitalia was prepped and draped  in the usual sterile fashion.  Two percent lidocaine jelly was inserted in the urethra and held in place with a penile clamp.  After sufficent time had passed to allow good local anesthesia, the cystoscope was inserted in the urethra and passed into the bladder visualizing the urethra along its entire course.  The dome, anterior, posterior and lateral walls were examined systematically.  The ureteral orifices were in their usual position and configuration.  The cystoscope was turned upon itself 180 degrees to visualize the bladder neck.  The cystoscope was then brought to the level of the bladder neck, the water was turned on and the prostate was visualized.  The cystoscope was removed and the patient was instructed to urinate prior to leaving the office.     ASSESSMENT/PLAN:  49 year old man status post flexible cystoscopy.  1. Push fluids for 24 hours.  2. May see blood in the urine, this should gradually improve over the next 2-3 days.  3. The  patient was instructed to return to the office or go to the emergency should fever, chills, cloudy urine, or inability to urinate develop.  4. Follow up in 3 months.

## 2020-07-28 NOTE — LETTER
July 28, 2020         [address]  [facility] [address]  Phone: [phone]  Fax: [phone]       Patient: Lola Stevens   YOB: 1970  Date of Visit: 07/28/2020    To Whom It May Concern:    Sundeep Stevens  was at [facility] on 07/28/2020. He may return to work on 7/29/2020 with no restrictions. If you have any questions or concerns, or if I can be of further assistance, please do not hesitate to contact me.    Sincerely,        Zulay Casillas MD

## 2020-07-28 NOTE — SUBJECTIVE & OBJECTIVE
Past Medical History:   Diagnosis Date    Chronic pain     Diverticulosis     GERD (gastroesophageal reflux disease)     Hemorrhoids     High blood pressure 6/10/2020    Lumbar radiculopathy        Past Surgical History:   Procedure Laterality Date    CHOLECYSTECTOMY      COLONOSCOPY      COLONOSCOPY N/A 10/17/2018    Procedure: COLONOSCOPY;  Surgeon: Cinda Davey MD;  Location: [address]);  Service: Endoscopy;  Laterality: N/A;    EPIDURAL STEROID INJECTION N/A 2020    Procedure: INJECTION, STEROID, EPIDURAL, L2-L3 need consent;  Surgeon: Albert Noble MD;  Location: [facility];  Service: Pain Management;  Laterality: N/A;    ESOPHAGOGASTRODUODENOSCOPY      ESOPHAGOGASTRODUODENOSCOPY N/A 10/17/2018    Procedure: EGD (ESOPHAGOGASTRODUODENOSCOPY);  Surgeon: Cinda Davey MD;  Location: [address]);  Service: Endoscopy;  Laterality: N/A;  Patient requested this date for transportation    TRANSFORAMINAL EPIDURAL INJECTION OF STEROID Bilateral 10/25/2019    Procedure: Injection,steroid,epidural,transforaminal approach LUMBAR TRANSFORAMINAL BILATERAL L5 TF ALBIN;  Surgeon: Albert Noble MD;  Location: [facility];  Service: Pain Management;  Laterality: Bilateral;  NEEDS CONSENT       Review of patient's allergies indicates:  No Known Allergies    Family History     Problem Relation (Age of Onset)    Heart disease Father          Tobacco Use    Smoking status: Former Smoker     Packs/day: 1.00     Years: 15.00     Pack years: 15.00     Types: Cigarettes     Quit date: 10/17/2002     Years since quittin.7    Smokeless tobacco: Former User     Quit date: 2018    Tobacco comment: quit smoking 15 years ago   Substance and Sexual Activity    Alcohol use: Not Currently     Comment: on weekends    Drug use: No    Sexual activity: Yes       Review of Systems   Constitutional: Negative.    HENT: Negative.    Eyes: Negative.    Respiratory: Negative.     Cardiovascular: Negative.    Gastrointestinal: Negative.    Genitourinary: Positive for frequency and urgency.   Musculoskeletal: Positive for back pain.   Skin: Negative.    Neurological: Negative.    Psychiatric/Behavioral: Negative.        Objective:     Temp:  [98.4 °F (36.9 °C)] 98.4 °F (36.9 °C)  Pulse:  [16] 16  Resp:  [16] 16  SpO2:  [97 %] 97 %  BP: (157)/(97) 157/97     Body mass index is 29.41 kg/m².    No intake/output data recorded.     Physical Exam   HENT:   Head: Normocephalic and atraumatic.   Nose: Nose normal.   Eyes: Pupils are equal, round, and reactive to light.   Neck: Normal range of motion.   Pulmonary/Chest: Effort normal.   Abdominal: Normal appearance.   Musculoskeletal: Normal range of motion.   Neurological: He is alert.   Skin: Skin is warm.     Psychiatric: His behavior is normal. Mood, judgment and thought content normal.

## 2020-07-28 NOTE — HPI
CHIEF COMPLAINT:     Mr. Stevens is a 49 y.o. male presenting for a consultation at the request of Dr. Be Durant. Patient presents with LUTS and suprapubic tenderness.     PRESENTING ILLNESS:     Lola Stevens Jr. is a 49 y.o. male who is new to me, has been seen in the department for prostatitis in the past.  The patient is accompanied by his wife and both give his history.  He has a several year history of abdominal pain, sometimes it is suprapubic and sometimes it is in the LLQ.  He has had a workup for diverticulosis, GERD.   He also states that there are times in which he is constipated for a few days, then he has copious bowel movements and the pain is better.   However, he continues to have suprapubic pain and this was felt to be secondary to his bladder.  The pain is so bad, that he has to draw up his knees and his wife states that it is severe in nature.  He is on gabapentin, has been on a tricyclic antidepressant for pain.  He has a history of lumbar radiculopathy and his pain improved after his epidural injection, but he could not have a bowel movement for several days.  He has seen Suman Hines several times and was diagnosed with non bacterial prostatitis.  Was on tamsulosin but the patient has been out for several weeks.  No family history of prostate cancer.       Urinary symptoms:  States he has a weak stream, particularly in the morning.  He feels like he does not empty his bladder but does not double void.  He has nocturia x 1.  He states that not drinking beer helps him with the pain.  He also states that ejaculation causes a pulling on the perinuem.

## 2020-07-28 NOTE — H&P
[facility]  Urology  History & Physical    Patient Name: Lola Stevens Jr.  MRN: [id number]  Admission Date: 7/28/2020  Code Status: Prior   Attending Provider: Zulay Casillas MD  Primary Care Physician: Xiomara Mello MD  Principal Problem:<principal problem not specified>    Subjective:     HPI:  CHIEF COMPLAINT:     Mr. Stevens is a 49 y.o. male presenting for a consultation at the request of Dr. Be Durant. Patient presents with LUTS and suprapubic tenderness.     PRESENTING ILLNESS:     Lola Stevens Jr. is a 49 y.o. male who is new to me, has been seen in the department for prostatitis in the past.  The patient is accompanied by his wife and both give his history.  He has a several year history of abdominal pain, sometimes it is suprapubic and sometimes it is in the LLQ.  He has had a workup for diverticulosis, GERD.   He also states that there are times in which he is constipated for a few days, then he has copious bowel movements and the pain is better.   However, he continues to have suprapubic pain and this was felt to be secondary to his bladder.  The pain is so bad, that he has to draw up his knees and his wife states that it is severe in nature.  He is on gabapentin, has been on a tricyclic antidepressant for pain.  He has a history of lumbar radiculopathy and his pain improved after his epidural injection, but he could not have a bowel movement for several days.  He has seen Suman Hines several times and was diagnosed with non bacterial prostatitis.  Was on tamsulosin but the patient has been out for several weeks.  No family history of prostate cancer.       Urinary symptoms:  States he has a weak stream, particularly in the morning.  He feels like he does not empty his bladder but does not double void.  He has nocturia x 1.  He states that not drinking beer helps him with the pain.  He also states that ejaculation causes a pulling on the perinuem.         Past Medical  History:   Diagnosis Date    Chronic pain     Diverticulosis     GERD (gastroesophageal reflux disease)     Hemorrhoids     High blood pressure 6/10/2020    Lumbar radiculopathy        Past Surgical History:   Procedure Laterality Date    CHOLECYSTECTOMY      COLONOSCOPY      COLONOSCOPY N/A 10/17/2018    Procedure: COLONOSCOPY;  Surgeon: Cinda Davey MD;  Location: [address]);  Service: Endoscopy;  Laterality: N/A;    EPIDURAL STEROID INJECTION N/A 2020    Procedure: INJECTION, STEROID, EPIDURAL, L2-L3 need consent;  Surgeon: Albert Noble MD;  Location: [facility];  Service: Pain Management;  Laterality: N/A;    ESOPHAGOGASTRODUODENOSCOPY      ESOPHAGOGASTRODUODENOSCOPY N/A 10/17/2018    Procedure: EGD (ESOPHAGOGASTRODUODENOSCOPY);  Surgeon: Cinda Davey MD;  Location: [address]);  Service: Endoscopy;  Laterality: N/A;  Patient requested this date for transportation    TRANSFORAMINAL EPIDURAL INJECTION OF STEROID Bilateral 10/25/2019    Procedure: Injection,steroid,epidural,transforaminal approach LUMBAR TRANSFORAMINAL BILATERAL L5 TF ALBIN;  Surgeon: Albert Noble MD;  Location: [facility];  Service: Pain Management;  Laterality: Bilateral;  NEEDS CONSENT       Review of patient's allergies indicates:  No Known Allergies    Family History     Problem Relation (Age of Onset)    Heart disease Father          Tobacco Use    Smoking status: Former Smoker     Packs/day: 1.00     Years: 15.00     Pack years: 15.00     Types: Cigarettes     Quit date: 10/17/2002     Years since quittin.7    Smokeless tobacco: Former User     Quit date: 2018    Tobacco comment: quit smoking 15 years ago   Substance and Sexual Activity    Alcohol use: Not Currently     Comment: on weekends    Drug use: No    Sexual activity: Yes       Review of Systems   Constitutional: Negative.    HENT: Negative.    Eyes: Negative.    Respiratory: Negative.    Cardiovascular: Negative.     Gastrointestinal: Negative.    Genitourinary: Positive for frequency and urgency.   Musculoskeletal: Positive for back pain.   Skin: Negative.    Neurological: Negative.    Psychiatric/Behavioral: Negative.        Objective:     Temp:  [98.4 °F (36.9 °C)] 98.4 °F (36.9 °C)  Pulse:  [16] 16  Resp:  [16] 16  SpO2:  [97 %] 97 %  BP: (157)/(97) 157/97     Body mass index is 29.41 kg/m².    No intake/output data recorded.     Physical Exam   HENT:   Head: Normocephalic and atraumatic.   Nose: Nose normal.   Eyes: Pupils are equal, round, and reactive to light.   Neck: Normal range of motion.   Pulmonary/Chest: Effort normal.   Abdominal: Normal appearance.   Musculoskeletal: Normal range of motion.   Neurological: He is alert.   Skin: Skin is warm.     Psychiatric: His behavior is normal. Mood, judgment and thought content normal.                   Assessment and Plan:     No notes have been filed under this hospital service.  Service: Urology    For [facility]    Zulay Casillas MD  Urology  [facility]

## 2020-10-20 ENCOUNTER — PATIENT MESSAGE (OUTPATIENT)
Dept: GASTROENTEROLOGY | Facility: CLINIC | Age: 50
End: 2020-10-20

## 2020-10-20 ENCOUNTER — PATIENT MESSAGE (OUTPATIENT)
Dept: PRIMARY CARE CLINIC | Facility: CLINIC | Age: 50
End: 2020-10-20

## 2020-10-20 ENCOUNTER — PATIENT MESSAGE (OUTPATIENT)
Dept: UROLOGY | Facility: CLINIC | Age: 50
End: 2020-10-20

## 2020-10-20 DIAGNOSIS — R53.82 CHRONIC FATIGUE: ICD-10-CM

## 2020-10-20 DIAGNOSIS — G89.29 OTHER CHRONIC PAIN: ICD-10-CM

## 2020-10-21 RX ORDER — PANTOPRAZOLE SODIUM 20 MG/1
20 TABLET, DELAYED RELEASE ORAL DAILY
Qty: 30 TABLET | Refills: 11 | Status: SHIPPED | OUTPATIENT
Start: 2020-10-21 | End: 2020-11-04 | Stop reason: SDUPTHER

## 2020-10-21 RX ORDER — PANTOPRAZOLE SODIUM 20 MG/1
20 TABLET, DELAYED RELEASE ORAL DAILY
Qty: 30 TABLET | Refills: 11 | Status: SHIPPED | OUTPATIENT
Start: 2020-10-21 | End: 2020-10-21 | Stop reason: SDUPTHER

## 2020-10-23 RX ORDER — DULOXETIN HYDROCHLORIDE 20 MG/1
20 CAPSULE, DELAYED RELEASE ORAL DAILY
Qty: 30 CAPSULE | Refills: 0 | Status: SHIPPED | OUTPATIENT
Start: 2020-10-23 | End: 2021-03-03

## 2020-11-04 RX ORDER — PANTOPRAZOLE SODIUM 20 MG/1
20 TABLET, DELAYED RELEASE ORAL DAILY
Qty: 30 TABLET | Refills: 11 | Status: SHIPPED | OUTPATIENT
Start: 2020-11-04 | End: 2021-02-04 | Stop reason: SDUPTHER

## 2020-12-08 ENCOUNTER — OFFICE VISIT (OUTPATIENT)
Dept: PRIMARY CARE CLINIC | Facility: CLINIC | Age: 50
End: 2020-12-08
Payer: COMMERCIAL

## 2020-12-08 VITALS
SYSTOLIC BLOOD PRESSURE: 154 MMHG | HEART RATE: 94 BPM | TEMPERATURE: 98 F | WEIGHT: 211 LBS | OXYGEN SATURATION: 100 % | DIASTOLIC BLOOD PRESSURE: 92 MMHG | BODY MASS INDEX: 30.27 KG/M2

## 2020-12-08 DIAGNOSIS — R42 DIZZINESS: ICD-10-CM

## 2020-12-08 DIAGNOSIS — M54.41 CHRONIC BILATERAL LOW BACK PAIN WITH BILATERAL SCIATICA: ICD-10-CM

## 2020-12-08 DIAGNOSIS — R19.7 DIARRHEA, UNSPECIFIED TYPE: ICD-10-CM
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
## 2020-12-08 DIAGNOSIS — M25.552 ARTHRALGIA OF LEFT HIP: ICD-10-CM

## 2020-12-08 DIAGNOSIS — G51.0 LEFT-SIDED BELL'S PALSY: ICD-10-CM

## 2020-12-08 DIAGNOSIS — R10.32 LEFT LOWER QUADRANT PAIN: ICD-10-CM

## 2020-12-08 DIAGNOSIS — M54.42 CHRONIC BILATERAL LOW BACK PAIN WITH BILATERAL SCIATICA: ICD-10-CM

## 2020-12-08 DIAGNOSIS — G89.29 CHRONIC BILATERAL LOW BACK PAIN WITH BILATERAL SCIATICA: ICD-10-CM

## 2020-12-08 DIAGNOSIS — R11.0 NAUSEA: ICD-10-CM

## 2020-12-08 DIAGNOSIS — S30.861S TICK BITE OF ABDOMEN, SEQUELA: Primary | ICD-10-CM

## 2020-12-08 DIAGNOSIS — M25.512 ARTHRALGIA OF LEFT SHOULDER REGION: ICD-10-CM

## 2020-12-08 DIAGNOSIS — R20.2 PARESTHESIA: ICD-10-CM

## 2020-12-08 PROCEDURE — 99999 PR PBB SHADOW E&M-EST. PATIENT-LVL IV: ICD-10-PCS | Mod: PBBFAC,,, | Performed by: STUDENT IN AN ORGANIZED HEALTH CARE EDUCATION/TRAINING PROGRAM

## 2020-12-08 PROCEDURE — 99999 PR PBB SHADOW E&M-EST. PATIENT-LVL IV: CPT | Mod: PBBFAC,,, | Performed by: STUDENT IN AN ORGANIZED HEALTH CARE EDUCATION/TRAINING PROGRAM

## 2020-12-08 PROCEDURE — 99214 OFFICE O/P EST MOD 30 MIN: CPT | Mod: S$GLB,,, | Performed by: STUDENT IN AN ORGANIZED HEALTH CARE EDUCATION/TRAINING PROGRAM

## 2020-12-08 PROCEDURE — 99214 PR OFFICE/OUTPT VISIT, EST, LEVL IV, 30-39 MIN: ICD-10-PCS | Mod: S$GLB,,, | Performed by: STUDENT IN AN ORGANIZED HEALTH CARE EDUCATION/TRAINING PROGRAM

## 2020-12-08 RX ORDER — METRONIDAZOLE 500 MG/1
500 TABLET ORAL EVERY 8 HOURS
Qty: 30 TABLET | Refills: 0 | Status: SHIPPED | OUTPATIENT
Start: 2020-12-08 | End: 2020-12-18

## 2020-12-08 RX ORDER — LEVOFLOXACIN 750 MG/1
750 TABLET ORAL DAILY
Qty: 10 TABLET | Refills: 0 | Status: SHIPPED | OUTPATIENT
Start: 2020-12-08 | End: 2020-12-18

## 2020-12-08 NOTE — PATIENT INSTRUCTIONS
Diverticulitis    Some people get pouches along the wall of the colon as they get older. The pouches, called diverticuli, usually cause no symptoms. If the pouches become blocked, you can get an infection. This infection is called diverticulitis. It causes pain in your lower abdomen and fever. If not treated, it can become a serious condition, causing an abscess to form inside the pouch. The abscess may block the intestinal tract even or rupture, spreading infection throughout the abdomen.  When treatment is started early, oral antibiotics alone may be enough to cure diverticulitis. This method is tried first. But, if you don't improve or if your condition gets worse while using oral antibiotics, you may need to be admitted to the hospital for IV antibiotics. Severe cases may require surgery.  Home care  The following guidelines will help you care for yourself at home:  · During the acute illness, rest and follow your healthcare provider's instructions about diet. Sometimes you will need to follow a clear liquid diet to rest your bowel. Once your symptoms are better, you may be told to follow a low-fiber diet for some time. Include foods like:  ¨ Flake cereal, mashed potatoes, pancakes, waffles, pasta, white bread, rice, applesauce, bananas, eggs, fish, poultry, tofu, and cooked soft vegetables  · Take antibiotics exactly as instructed. Don't miss any doses or stop taking the medication, even if you feel better.  · Monitor your temperature and tell your healthcare provider if you have rising temperatures.  Preventing future attacks  Once you have an episode of diverticulitis, you are at risk for having it again. After you have recovered from this episode, you may be able to lower your risk by eating a high-fiber diet (20 gm/day to 35 gm/day of fiber). This cleans out the colon pouches that already exist and may prevent new ones from forming. Foods high in fiber include fresh fruits and edible peelings, raw or  lightly cooked vegetables, whole grain cereals and breads, dried beans and peas, and bran.  Other steps that can help prevent future attacks include:  · Take your medicines, such as antibiotics, as your healthcare provider says.  · Drink 6 to 8 glasses of water every day, unless told otherwise.  · Use a heating pad or hot water bottle to help abdominal cramping or pain.  · Begin an exercise program. Ask your healthcare provider how to get started. You can benefit from simple activities such as walking or gardening.  · Treat diarrhea with a bland diet. Start with liquids only; then slowly add fiber over time.  · Watch for changes in your bowel movements (constipation to diarrhea). Avoid constipation by eating a high fiber diet and taking a stool softener if needed.  · Get plenty of rest and sleep.  Follow-up care  Follow up with your healthcare provider as advised or sooner if you are not getting better in the next 2 days.  When to seek medical advice  Call your healthcare provider right away if any of these occur:  · Fever of 100.4°F (38°C) or higher, or as directed by your healthcare provider  · Repeated vomiting or swelling of the abdomen  · Weakness, dizziness, light-headedness  · Pain in your abdomen that gets worse, severe, or spreads to your back  · Pain that moves to the right lower abdomen  · Rectal bleeding (stools that are red, black or maroon color)  · Unexpected vaginal bleeding  Date Last Reviewed: 9/1/2016  © 5209-1020 [employer]. [address] [place] [address], [place] [address]. All rights reserved. This information is not intended as a substitute for professional medical care. Always follow your healthcare professional's instructions.        Discharge Instructions for Diverticulitis  You have been diagnosed with diverticulitis. This is a condition in which small pouches form in your colon (large intestine) and become inflamed or infected. Follow the guidelines below for home care.  As you  recover  Tips for recovery include:  · Eat a low-fiber diet. Your healthcare provider may advise a liquid diet. This gives your bowel a chance to rest so that it can recover.  · Foods to include: flake cereal, mashed potatoes, pancakes, waffles, pasta, white bread, rice, applesauce, bananas, eggs, fish, poultry, tofu, and well-cooked vegetables  · Take your medicines as directed. Do not stop taking the medicines, even if you feel better.  · Monitor your temperature and report any rising temperature to your healthcare provider.  · Take antibiotics exactly as directed. Do not miss any and keep taking them even if you feel better.   · Drink 6 to 8 glasses of water every day, unless directed otherwise.  · Use a heating pad or hot water bottle to reduce abdominal cramping or pain.  Preventing diverticulitis in the future  Tips for prevention include:  · Eat a high-fiber diet. Fiber adds bulk to the stool so that it passes through the large intestine more easily.  · Keep drinking 6 to 8 glasses of water every day, unless directed otherwise.  · Begin an exercise program. Ask your healthcare provider how to get started. You can benefit from simple activities such as walking or gardening.  · Treat diarrhea with a bland diet. Start with liquids only, then slowly add fiber over time.  · Watch for changes in your bowel movements (constipation to diarrhea).  · Avoid constipation with fiber and add a stool softener if needed.   · Get plenty of rest and sleep.  Follow-up care  Make a follow-up appointment as directed by our staff.  When to call your healthcare provider  Call your healthcare provider immediately if you have any of the following:  · Fever of 100.4°F (38.0°C) or higher, or as directed by your healthcare provider  · Chills  · Severe cramps in the belly, most commonly the lower left side  · Tenderness in the belly, most commonly the lower left side  · Nausea and vomiting  · Bleeding from your rectum   Date Last  "Reviewed: 7/1/2016  © 4297-6556 [employer]. [address] [place] [address], [place] [address]. All rights reserved. This information is not intended as a substitute for professional medical care. Always follow your healthcare professional's instructions.        Tick Bites  Ticks are small arachnids that feed on the blood of rodents, rabbits, birds, deer, dogs, and people. A tick bite may cause a reaction like a spider bite. You may have redness, itching, and slight swelling at the site. Sometimes you may have no reaction where the tick bit you.  Ticks may gorge themselves for days before you find and remove them. The bites themselves aren't cause for concern. But ticks can carry and pass on illnesses such as Lyme disease and [place] spotted fever. Both diseases begin with a rash and symptoms similar to the flu. In advanced stages, these diseases can be quite serious.     A "bull's eye" rash is a common symptom of Lyme disease.   When to go to the emergency department (ED)  Not all ticks carry disease. And a tick must stay attached for at least 24 hours to infect you. If you find a tick, don't panic. Try to carefully remove it with tweezers. Grasp the tick near its head and pull without twisting. If you can't easily dislodge the tick or if you leave the head in your skin, get medical care right away.  What to expect in the ED  · The tick or any parts of the tick will be removed and the bite will be cleaned.  · To prevent disease, you may be given antibiotics. Both Lyme disease and [place] spotted fever respond quickly to these medicines.  · You may be asked to see your healthcare provider for a blood test to check for Lyme disease.  Follow-up care  Some states and [facility] have services that test ticks for Lyme disease and other diseases. Check with your local officials to see if this service is available in your area.  If you remove a tick yourself, watch for signs of a tick-borne illness. " Symptoms may show up within a few days or weeks after a bite. Call your healthcare provider if you notice any of the following:  · Rash. The rash may spread outward in a ring from a hard white lump. Or it may move up your arms and legs to your chest.  · Chills and fever  · Body aches and joint pain  · Severe headache  Date Last Reviewed: 12/1/2016  © 1911-4167 [employer]. [address] [place] [address], [place] [address]. All rights reserved. This information is not intended as a substitute for professional medical care. Always follow your healthcare professional's instructions.

## 2020-12-08 NOTE — PROGRESS NOTES
Subjective:       Patient ID: Lola Stevens Jr. is a 50 y.o. male.    Chief Complaint: Insect Bite (happen April or May 2020 on belt line of stomache, would like to be checked for Lyme Disease), Diarrhea, Dizziness, Nausea, Numbness (face), and Pain (hip, chest on left side)    HPI   50 y.o. male presents to [facility] with complaints of numerous symptoms experienced following tick bite April-May 2020. Reports has been experiencing nausea, diarrhea, dizziness, left hip and chest wall pain, and facial numbness since that time. Patient reports following tick bite tick was removed via manually by hand. Had bulls-eye type rash for month following. Area kept bothering and itching him for over a month after.    Reports has left sided facial droop since March 2020, has been present every day. Wife reports noticing some droop in left face and patient reports periorbital numbness, occasionally left scalp numbness.    Left shoulder pain past 6 months, left hip pain past 6 years, and chronic low back pain that have all been acutely worse following tick bite.    Reports has been having stomach issues for some time, reports has been present at least 6 years. Diagnosis of diverticulitis with acute worsening of left lower quadrant past 2-3 weeks. Very soft stool, up to 3 times daily. Takes Miralax and metamucil. Has been following with gastroenterologist for diverticulosis. Last seen in Gastroenterology clinic a few months ago.    No reported family history of RA, lupus, thyroid disease, or other autoimmune disease.    Review of Systems   Constitutional: Positive for fatigue. Negative for chills, diaphoresis and fever.   HENT: Positive for rhinorrhea and sneezing. Negative for congestion, sinus pressure and sore throat.    Eyes: Positive for discharge, itching and visual disturbance.   Respiratory: Positive for cough (clears until 9am) and shortness of breath (Feels like he has to breath deeper).    Cardiovascular:  Positive for palpitations (Doesn't wake from sleep). Negative for chest pain.   Gastrointestinal: Positive for abdominal pain (LLQ, past 3 weeks), diarrhea and nausea. Negative for vomiting.   Musculoskeletal: Positive for arthralgias and myalgias (left upper thigh). Negative for joint swelling.   Skin: Negative for rash (Has bullseye rash 1 month following tick bite) and wound.   Neurological: Positive for dizziness, speech difficulty, weakness, numbness (left face) and headaches (bilateral temporal region, comes and goes). Negative for syncope.       Objective:      Vitals:    12/08/20 1141   BP: (!) 154/92   BP Location: Left arm   Patient Position: Sitting   Pulse: 94   Temp: 98.1 °F (36.7 °C)   TempSrc: Temporal   SpO2: 100%   Weight: 95.7 kg (210 lb 15.7 oz)     Physical Exam  Vitals signs reviewed.   Constitutional:       General: He is not in acute distress.     Appearance: Normal appearance. He is not ill-appearing.   HENT:      Head: Normocephalic and atraumatic.      Mouth/Throat:      Mouth: Mucous membranes are moist.      Pharynx: Oropharynx is clear.   Eyes:      General: No visual field deficit.        Right eye: No discharge.         Left eye: No discharge.   Cardiovascular:      Rate and Rhythm: Normal rate and regular rhythm.      Pulses: Normal pulses.      Heart sounds: Normal heart sounds.   Pulmonary:      Effort: Pulmonary effort is normal.      Breath sounds: Normal breath sounds.   Abdominal:      General: Abdomen is flat. Bowel sounds are increased. There is no distension.      Palpations: Abdomen is soft. There is no fluid wave, hepatomegaly or mass.      Tenderness: There is abdominal tenderness in the left lower quadrant. There is guarding. There is no right CVA tenderness, left CVA tenderness or rebound.      Hernia: A hernia is present. Hernia is present in the ventral area.   Musculoskeletal:         General: No deformity.   Skin:     General: Skin is warm and dry.      Coloration:  Skin is not jaundiced.   Neurological:      General: No focal deficit present.      Mental Status: He is alert and oriented to person, place, and time.      GCS: GCS eye subscore is 4. GCS verbal subscore is 5. GCS motor subscore is 6.      Cranial Nerves: Cranial nerve deficit and facial asymmetry present. No dysarthria.      Sensory: Sensory deficit present.      Motor: Motor function is intact. No weakness or abnormal muscle tone.      Gait: Gait is intact.      Comments: Mild facial droop left lip with mildly diminished nasolabial fold, normalizes with smile    Altered sensation left face, mildly diminished   Psychiatric:         Mood and Affect: Mood normal.         Behavior: Behavior normal.             Lab Results   Component Value Date     06/08/2020    K 4.8 06/08/2020     06/08/2020    CO2 20 (L) 06/08/2020    BUN 18 06/08/2020    CREATININE 0.9 06/08/2020    ANIONGAP 12 06/08/2020     Lab Results   Component Value Date    HGBA1C 5.1 03/04/2019     Lab Results   Component Value Date    BNP 39 06/08/2020       Lab Results   Component Value Date    WBC 6.54 06/08/2020    HGB 16.0 06/08/2020    HCT 48.5 06/08/2020     06/08/2020    GRAN 3.6 06/08/2020    GRAN 55.7 06/08/2020     Lab Results   Component Value Date    CHOL 213 (H) 03/04/2019    HDL 41 03/04/2019    LDLCALC 134.6 03/04/2019    TRIG 187 (H) 03/04/2019          Current Outpatient Medications:     cetirizine (ZYRTEC) 10 MG tablet, Take 10 mg by mouth once daily., Disp: , Rfl:     pantoprazole (PROTONIX) 20 MG tablet, Take 1 tablet (20 mg total) by mouth once daily., Disp: 30 tablet, Rfl: 11    DULoxetine (CYMBALTA) 20 MG capsule, Take 1 capsule (20 mg total) by mouth once daily., Disp: 30 capsule, Rfl: 0    gabapentin (NEURONTIN) 300 MG capsule, Take 1 capsule (300 mg total) by mouth 3 (three) times daily. One at bedtime for 7 days, then 2 a day for 7 days and then 3 a day, Disp: 90 capsule, Rfl: 2    levoFLOXacin (LEVAQUIN)  750 MG tablet, Take 1 tablet (750 mg total) by mouth once daily. for 10 days, Disp: 10 tablet, Rfl: 0    metroNIDAZOLE (FLAGYL) 500 MG tablet, Take 1 tablet (500 mg total) by mouth every 8 (eight) hours. for 10 days, Disp: 30 tablet, Rfl: 0    ondansetron (ZOFRAN-ODT) 4 MG TbDL, Take 1 tablet (4 mg total) by mouth every 6 (six) hours as needed (Nausea). (Patient not taking: Reported on 12/8/2020), Disp: 10 tablet, Rfl: 0    phenazopyridine (PYRIDIUM) 200 MG tablet, Take 1 tablet (200 mg total) by mouth every 8 (eight) hours as needed for Pain., Disp: 6 tablet, Rfl: 0    tamsulosin (FLOMAX) 0.4 mg Cap, Take 1 capsule (0.4 mg total) by mouth once daily. Take first capsule at bedtime.  Thereafter, take with evening meal., Disp: 30 capsule, Rfl: 11    traMADoL (ULTRAM) 50 mg tablet, Take 1 tablet (50 mg total) by mouth every 12 (twelve) hours as needed for Pain., Disp: 5 tablet, Rfl: 0  No current facility-administered medications for this visit.     Facility-Administered Medications Ordered in Other Visits:     0.9%  NaCl infusion, 500 mL, Intravenous, Continuous, Albert Noble MD    0.9%  NaCl infusion, 500 mL, Intravenous, Continuous, Albert Noble MD        Assessment:       1. Tick bite of abdomen, sequela    2. Left-sided Bell's palsy    3. Paresthesia    4. Left lower quadrant pain    5. Diarrhea, unspecified type    6. Dizziness    7. Nausea    8. Arthralgia of left hip    9. Arthralgia of left shoulder region    10. Chronic bilateral low back pain with bilateral sciatica           Plan:       Tick bite of abdomen, sequela  Left-sided Bell's palsy  -     B. burgdorferi Abs (Lyme Disease); Future; Expected date: 12/08/2020  -     TSH; Future; Expected date: 12/08/2020  - Bullseye rash reported persisting about one month after bite  - Constellation of symptoms with concern for Lyme disease, will test and treat as necessary  - Bell's palsy with mild symptoms, outside of steroid benefit  window    Paresthesia  Dizziness  -     B. burgdorferi Abs (Lyme Disease); Future; Expected date: 12/08/2020  -     TSH; Future; Expected date: 12/08/2020  -     VITAMIN B12; Future; Expected date: 12/08/2020    Left lower quadrant pain  Diarrhea, unspecified type  Nausea  -     Cancel: C-reactive protein; Future; Expected date: 12/08/2020  -     Cancel: CT Abdomen Pelvis With Contrast; Future; Expected date: 12/08/2020  -     CT Abdomen Pelvis With Contrast; Future; Expected date: 12/08/2020  -     C-reactive protein; Future; Expected date: 12/08/2020  -     metroNIDAZOLE (FLAGYL) 500 MG tablet; Take 1 tablet (500 mg total) by mouth every 8 (eight) hours. for 10 days  Dispense: 30 tablet; Refill: 0  -     levoFLOXacin (LEVAQUIN) 750 MG tablet; Take 1 tablet (750 mg total) by mouth once daily. for 10 days  Dispense: 10 tablet; Refill: 0  - Concern for active diverticulitis, will manage as outpatient with STAT CT abdomen/pelvis ordered, phone number verified and will contact patient to present to ED if image findings necessitate    Arthralgia of left hip  Arthralgia of left shoulder region  Chronic bilateral low back pain with bilateral sciatica  -     GADIEL Screen w/Reflex; Future; Expected date: 12/08/2020  -     Rheumatoid Factor; Future; Expected date: 12/08/2020  - With acute on chronic symptoms, will evaluate for Lyme disease    RTC in 2 weeks, Present to ED if abdominal symptoms worsen

## 2020-12-09 ENCOUNTER — PATIENT MESSAGE (OUTPATIENT)
Dept: PRIMARY CARE CLINIC | Facility: CLINIC | Age: 50
End: 2020-12-09

## 2020-12-09 DIAGNOSIS — I10 HYPERTENSION, UNSPECIFIED TYPE: Primary | ICD-10-CM

## 2020-12-09 RX ORDER — AMLODIPINE BESYLATE 5 MG/1
5 TABLET ORAL DAILY
Qty: 30 TABLET | Refills: 11 | Status: SHIPPED | OUTPATIENT
Start: 2020-12-09 | End: 2020-12-23

## 2020-12-09 NOTE — TELEPHONE ENCOUNTER
Informed patient will start amlodipine 5 mg daily and patient reports has BP cuff at home and will present back to clinic with log next visit. States BP has been high past few clinic visits and on home cuff, will continue to monitor after starting medication.

## 2020-12-10 DIAGNOSIS — M25.50 ARTHRALGIA, UNSPECIFIED JOINT: ICD-10-CM

## 2020-12-10 DIAGNOSIS — R76.8 POSITIVE ANA (ANTINUCLEAR ANTIBODY): Primary | ICD-10-CM

## 2020-12-11 ENCOUNTER — TELEPHONE (OUTPATIENT)
Dept: PRIMARY CARE CLINIC | Facility: CLINIC | Age: 50
End: 2020-12-11

## 2020-12-11 NOTE — TELEPHONE ENCOUNTER
----- Message from Von Aguiar MD sent at 12/11/2020  8:03 AM CST -----  Please let patient know that Lyme serology was negative, and to keep follow-up with Rheumatology

## 2020-12-17 ENCOUNTER — TELEPHONE (OUTPATIENT)
Dept: RHEUMATOLOGY | Facility: CLINIC | Age: 50
End: 2020-12-17
Payer: COMMERCIAL

## 2020-12-22 ENCOUNTER — PATIENT MESSAGE (OUTPATIENT)
Dept: PRIMARY CARE CLINIC | Facility: CLINIC | Age: 50
End: 2020-12-22

## 2020-12-22 ENCOUNTER — PATIENT MESSAGE (OUTPATIENT)
Dept: RHEUMATOLOGY | Facility: CLINIC | Age: 50
End: 2020-12-22

## 2020-12-23 DIAGNOSIS — R76.8 POSITIVE ANA (ANTINUCLEAR ANTIBODY): ICD-10-CM

## 2020-12-23 DIAGNOSIS — M25.50 ARTHRALGIA, UNSPECIFIED JOINT: Primary | ICD-10-CM

## 2020-12-23 DIAGNOSIS — I10 HYPERTENSION, UNSPECIFIED TYPE: ICD-10-CM

## 2020-12-23 RX ORDER — AMLODIPINE BESYLATE 10 MG/1
10 TABLET ORAL DAILY
Qty: 30 TABLET | Refills: 11 | Status: SHIPPED | OUTPATIENT
Start: 2020-12-23 | End: 2021-01-26

## 2020-12-23 RX ORDER — MELOXICAM 15 MG/1
15 TABLET ORAL DAILY
Qty: 30 TABLET | Refills: 1 | Status: SHIPPED | OUTPATIENT
Start: 2020-12-23 | End: 2021-02-17

## 2021-01-06 ENCOUNTER — HOSPITAL ENCOUNTER (OUTPATIENT)
Dept: RADIOLOGY | Facility: HOSPITAL | Age: 51
Discharge: HOME | End: 2021-01-06
Payer: COMMERCIAL

## 2021-01-06 ENCOUNTER — OFFICE VISIT (OUTPATIENT)
Dept: RHEUMATOLOGY | Facility: CLINIC | Age: 51
End: 2021-01-06
Payer: COMMERCIAL

## 2021-01-06 VITALS
HEIGHT: 70 IN | WEIGHT: 220.25 LBS | DIASTOLIC BLOOD PRESSURE: 91 MMHG | SYSTOLIC BLOOD PRESSURE: 154 MMHG | HEART RATE: 82 BPM | BODY MASS INDEX: 31.53 KG/M2

## 2021-01-06 DIAGNOSIS — G89.29 CHRONIC BILATERAL LOW BACK PAIN WITH BILATERAL SCIATICA: ICD-10-CM

## 2021-01-06 DIAGNOSIS — M25.50 ARTHRALGIA, UNSPECIFIED JOINT: ICD-10-CM

## 2021-01-06 DIAGNOSIS — M25.562 PAIN IN BOTH KNEES, UNSPECIFIED CHRONICITY: ICD-10-CM

## 2021-01-06 DIAGNOSIS — M54.41 CHRONIC BILATERAL LOW BACK PAIN WITH BILATERAL SCIATICA: ICD-10-CM

## 2021-01-06 DIAGNOSIS — R76.8 POSITIVE ANA (ANTINUCLEAR ANTIBODY): ICD-10-CM

## 2021-01-06 DIAGNOSIS — M54.42 CHRONIC BILATERAL LOW BACK PAIN WITH BILATERAL SCIATICA: ICD-10-CM

## 2021-01-06 DIAGNOSIS — M47.816 SPONDYLOSIS OF LUMBAR REGION WITHOUT MYELOPATHY OR RADICULOPATHY: Primary | ICD-10-CM

## 2021-01-06 DIAGNOSIS — M25.561 PAIN IN BOTH KNEES, UNSPECIFIED CHRONICITY: ICD-10-CM

## 2021-01-06 PROCEDURE — 72200 XR SACROILIAC JOINTS 3 VIEWS: ICD-10-PCS | Mod: 26,,, | Performed by: RADIOLOGY

## 2021-01-06 PROCEDURE — 99999 PR PBB SHADOW E&M-EST. PATIENT-LVL V: ICD-10-PCS | Mod: PBBFAC,,,

## 2021-01-06 PROCEDURE — 72200 X-RAY EXAM SI JOINTS: CPT | Mod: TC

## 2021-01-06 PROCEDURE — 99999 PR PBB SHADOW E&M-EST. PATIENT-LVL V: CPT | Mod: PBBFAC,,,

## 2021-01-06 PROCEDURE — 73562 X-RAY EXAM OF KNEE 3: CPT | Mod: 26,50,, | Performed by: RADIOLOGY

## 2021-01-06 PROCEDURE — 99204 OFFICE O/P NEW MOD 45 MIN: CPT | Mod: S$GLB,,,

## 2021-01-06 PROCEDURE — 73562 X-RAY EXAM OF KNEE 3: CPT | Mod: TC,50

## 2021-01-06 PROCEDURE — 99204 PR OFFICE/OUTPT VISIT, NEW, LEVL IV, 45-59 MIN: ICD-10-PCS | Mod: S$GLB,,,

## 2021-01-06 PROCEDURE — 73562 XR KNEE 3 VIEW BILATERAL: ICD-10-PCS | Mod: 26,50,, | Performed by: RADIOLOGY

## 2021-01-06 PROCEDURE — 72200 X-RAY EXAM SI JOINTS: CPT | Mod: 26,,, | Performed by: RADIOLOGY

## 2021-01-09 PROBLEM — M25.562 PAIN IN BOTH KNEES: Status: ACTIVE | Noted: 2021-01-09

## 2021-01-09 PROBLEM — M54.41 CHRONIC BILATERAL LOW BACK PAIN WITH BILATERAL SCIATICA: Status: ACTIVE | Noted: 2021-01-09

## 2021-01-09 PROBLEM — R76.8 POSITIVE ANA (ANTINUCLEAR ANTIBODY): Status: ACTIVE | Noted: 2021-01-09

## 2021-01-09 PROBLEM — M54.42 CHRONIC BILATERAL LOW BACK PAIN WITH BILATERAL SCIATICA: Status: ACTIVE | Noted: 2021-01-09

## 2021-01-09 PROBLEM — M25.561 PAIN IN BOTH KNEES: Status: ACTIVE | Noted: 2021-01-09

## 2021-01-09 PROBLEM — G89.29 CHRONIC BILATERAL LOW BACK PAIN WITH BILATERAL SCIATICA: Status: ACTIVE | Noted: 2021-01-09

## 2021-01-14 ENCOUNTER — PATIENT MESSAGE (OUTPATIENT)
Dept: ORTHOPEDICS | Facility: CLINIC | Age: 51
End: 2021-01-14

## 2021-01-15 ENCOUNTER — OFFICE VISIT (OUTPATIENT)
Dept: ORTHOPEDICS | Facility: CLINIC | Age: 51
End: 2021-01-15
Payer: COMMERCIAL

## 2021-01-15 ENCOUNTER — HOSPITAL ENCOUNTER (OUTPATIENT)
Dept: RADIOLOGY | Facility: HOSPITAL | Age: 51
Discharge: HOME | End: 2021-01-15
Attending: ORTHOPAEDIC SURGERY
Payer: COMMERCIAL

## 2021-01-15 VITALS — HEIGHT: 70 IN | BODY MASS INDEX: 30.87 KG/M2 | WEIGHT: 215.63 LBS

## 2021-01-15 DIAGNOSIS — M54.16 LUMBAR RADICULOPATHY: Primary | ICD-10-CM

## 2021-01-15 DIAGNOSIS — M51.36 DDD (DEGENERATIVE DISC DISEASE), LUMBAR: ICD-10-CM

## 2021-01-15 PROCEDURE — 72120 X-RAY BEND ONLY L-S SPINE: CPT | Mod: TC

## 2021-01-15 PROCEDURE — 72100 XR LUMBAR SPINE AP AND LAT WITH FLEX/EXT: ICD-10-PCS | Mod: 26,,, | Performed by: RADIOLOGY

## 2021-01-15 PROCEDURE — 99214 PR OFFICE/OUTPT VISIT, EST, LEVL IV, 30-39 MIN: ICD-10-PCS | Mod: S$GLB,,, | Performed by: ORTHOPAEDIC SURGERY

## 2021-01-15 PROCEDURE — 72120 XR LUMBAR SPINE AP AND LAT WITH FLEX/EXT: ICD-10-PCS | Mod: 26,,, | Performed by: RADIOLOGY

## 2021-01-15 PROCEDURE — 99999 PR PBB SHADOW E&M-EST. PATIENT-LVL III: ICD-10-PCS | Mod: PBBFAC,,, | Performed by: ORTHOPAEDIC SURGERY

## 2021-01-15 PROCEDURE — 99214 OFFICE O/P EST MOD 30 MIN: CPT | Mod: S$GLB,,, | Performed by: ORTHOPAEDIC SURGERY

## 2021-01-15 PROCEDURE — 99999 PR PBB SHADOW E&M-EST. PATIENT-LVL III: CPT | Mod: PBBFAC,,, | Performed by: ORTHOPAEDIC SURGERY

## 2021-01-15 PROCEDURE — 72100 X-RAY EXAM L-S SPINE 2/3 VWS: CPT | Mod: 26,,, | Performed by: RADIOLOGY

## 2021-01-15 PROCEDURE — 72120 X-RAY BEND ONLY L-S SPINE: CPT | Mod: 26,,, | Performed by: RADIOLOGY

## 2021-01-15 RX ORDER — PREGABALIN 75 MG/1
75 CAPSULE ORAL 2 TIMES DAILY
Qty: 60 CAPSULE | Refills: 6 | Status: SHIPPED | OUTPATIENT
Start: 2021-01-15 | End: 2021-03-03

## 2021-01-19 ENCOUNTER — PATIENT MESSAGE (OUTPATIENT)
Dept: PAIN MEDICINE | Facility: OTHER | Age: 51
End: 2021-01-19

## 2021-01-19 ENCOUNTER — TELEPHONE (OUTPATIENT)
Dept: PAIN MEDICINE | Facility: OTHER | Age: 51
End: 2021-01-19

## 2021-01-19 DIAGNOSIS — M54.16 LUMBAR RADICULOPATHY: Primary | ICD-10-CM

## 2021-01-23 ENCOUNTER — PATIENT MESSAGE (OUTPATIENT)
Dept: PRIMARY CARE CLINIC | Facility: CLINIC | Age: 51
End: 2021-01-23

## 2021-01-23 DIAGNOSIS — I10 HYPERTENSION, UNSPECIFIED TYPE: Primary | ICD-10-CM

## 2021-01-26 RX ORDER — AMLODIPINE AND BENAZEPRIL HYDROCHLORIDE 5; 10 MG/1; MG/1
1 CAPSULE ORAL DAILY
Qty: 90 CAPSULE | Refills: 3 | Status: SHIPPED | OUTPATIENT
Start: 2021-01-26 | End: 2021-03-03

## 2021-01-29 ENCOUNTER — HOSPITAL ENCOUNTER (OUTPATIENT)
Facility: OTHER | Age: 51
Discharge: HOME | End: 2021-01-29
Attending: ANESTHESIOLOGY | Admitting: ANESTHESIOLOGY
Payer: COMMERCIAL

## 2021-01-29 VITALS
HEART RATE: 77 BPM | DIASTOLIC BLOOD PRESSURE: 83 MMHG | HEIGHT: 70 IN | BODY MASS INDEX: 30.78 KG/M2 | TEMPERATURE: 98 F | OXYGEN SATURATION: 96 % | SYSTOLIC BLOOD PRESSURE: 125 MMHG | RESPIRATION RATE: 14 BRPM | WEIGHT: 215 LBS

## 2021-01-29 DIAGNOSIS — G89.29 CHRONIC PAIN: ICD-10-CM

## 2021-01-29 DIAGNOSIS — M54.16 LUMBAR RADICULOPATHY: Primary | ICD-10-CM

## 2021-01-29 PROCEDURE — 64483 NJX AA&/STRD TFRM EPI L/S 1: CPT | Mod: 50,,, | Performed by: ANESTHESIOLOGY

## 2021-01-29 PROCEDURE — 63600175 PHARM REV CODE 636 W HCPCS: Performed by: ANESTHESIOLOGY

## 2021-01-29 PROCEDURE — 25500020 PHARM REV CODE 255: Performed by: ANESTHESIOLOGY

## 2021-01-29 PROCEDURE — 64483 NJX AA&/STRD TFRM EPI L/S 1: CPT | Mod: 50 | Performed by: ANESTHESIOLOGY

## 2021-01-29 PROCEDURE — 25000003 PHARM REV CODE 250: Performed by: ANESTHESIOLOGY

## 2021-01-29 PROCEDURE — 64483 PR EPIDURAL INJ, ANES/STEROID, TRANSFORAMINAL, LUMB/SACR, SNGL LEVL: ICD-10-PCS | Mod: 50,,, | Performed by: ANESTHESIOLOGY

## 2021-01-29 RX ORDER — DEXAMETHASONE SODIUM PHOSPHATE 10 MG/ML
INJECTION INTRAMUSCULAR; INTRAVENOUS
Status: DISCONTINUED | OUTPATIENT
Start: 2021-01-29 | End: 2021-01-29 | Stop reason: HOSPADM

## 2021-01-29 RX ORDER — FENTANYL CITRATE 50 UG/ML
INJECTION, SOLUTION INTRAMUSCULAR; INTRAVENOUS
Status: DISCONTINUED | OUTPATIENT
Start: 2021-01-29 | End: 2021-01-29 | Stop reason: HOSPADM

## 2021-01-29 RX ORDER — SODIUM CHLORIDE 9 MG/ML
INJECTION, SOLUTION INTRAVENOUS CONTINUOUS
Status: DISCONTINUED | OUTPATIENT
Start: 2021-01-29 | End: 2021-01-29 | Stop reason: HOSPADM

## 2021-01-29 RX ORDER — MIDAZOLAM HYDROCHLORIDE 1 MG/ML
INJECTION INTRAMUSCULAR; INTRAVENOUS
Status: DISCONTINUED | OUTPATIENT
Start: 2021-01-29 | End: 2021-01-29 | Stop reason: HOSPADM

## 2021-01-29 RX ORDER — LIDOCAINE HYDROCHLORIDE 5 MG/ML
INJECTION, SOLUTION INFILTRATION; INTRAVENOUS
Status: DISCONTINUED | OUTPATIENT
Start: 2021-01-29 | End: 2021-01-29 | Stop reason: HOSPADM

## 2021-01-29 RX ORDER — LIDOCAINE HYDROCHLORIDE 10 MG/ML
INJECTION INFILTRATION; PERINEURAL
Status: DISCONTINUED | OUTPATIENT
Start: 2021-01-29 | End: 2021-01-29 | Stop reason: HOSPADM

## 2021-02-03 ENCOUNTER — PATIENT MESSAGE (OUTPATIENT)
Dept: GASTROENTEROLOGY | Facility: CLINIC | Age: 51
End: 2021-02-03

## 2021-02-04 RX ORDER — PANTOPRAZOLE SODIUM 20 MG/1
20 TABLET, DELAYED RELEASE ORAL DAILY
Qty: 30 TABLET | Refills: 11 | Status: SHIPPED | OUTPATIENT
Start: 2021-02-04 | End: 2021-03-26 | Stop reason: SDUPTHER

## 2021-02-23 ENCOUNTER — PATIENT MESSAGE (OUTPATIENT)
Dept: RHEUMATOLOGY | Facility: CLINIC | Age: 51
End: 2021-02-23

## 2021-03-03 ENCOUNTER — OFFICE VISIT (OUTPATIENT)
Dept: PRIMARY CARE CLINIC | Facility: CLINIC | Age: 51
End: 2021-03-03
Payer: COMMERCIAL

## 2021-03-03 VITALS
OXYGEN SATURATION: 97 % | BODY MASS INDEX: 30.99 KG/M2 | HEART RATE: 78 BPM | SYSTOLIC BLOOD PRESSURE: 150 MMHG | HEIGHT: 70 IN | RESPIRATION RATE: 18 BRPM | WEIGHT: 216.5 LBS | DIASTOLIC BLOOD PRESSURE: 88 MMHG

## 2021-03-03 DIAGNOSIS — E78.2 MIXED HYPERLIPIDEMIA: ICD-10-CM

## 2021-03-03 DIAGNOSIS — Z11.4 SCREENING FOR HIV (HUMAN IMMUNODEFICIENCY VIRUS): ICD-10-CM

## 2021-03-03 DIAGNOSIS — Z11.59 NEED FOR HEPATITIS C SCREENING TEST: ICD-10-CM

## 2021-03-03 DIAGNOSIS — I10 ESSENTIAL HYPERTENSION: Primary | ICD-10-CM

## 2021-03-03 DIAGNOSIS — G47.33 OSA (OBSTRUCTIVE SLEEP APNEA): ICD-10-CM

## 2021-03-03 PROBLEM — R55 PRE-SYNCOPE: Status: RESOLVED | Noted: 2019-03-03 | Resolved: 2021-03-03

## 2021-03-03 PROCEDURE — 99999 PR PBB SHADOW E&M-EST. PATIENT-LVL III: CPT | Mod: PBBFAC,,, | Performed by: FAMILY MEDICINE

## 2021-03-03 PROCEDURE — 99214 OFFICE O/P EST MOD 30 MIN: CPT | Mod: S$GLB,,, | Performed by: FAMILY MEDICINE

## 2021-03-03 PROCEDURE — 99999 PR PBB SHADOW E&M-EST. PATIENT-LVL III: ICD-10-PCS | Mod: PBBFAC,,, | Performed by: FAMILY MEDICINE

## 2021-03-03 PROCEDURE — 99214 PR OFFICE/OUTPT VISIT, EST, LEVL IV, 30-39 MIN: ICD-10-PCS | Mod: S$GLB,,, | Performed by: FAMILY MEDICINE

## 2021-03-03 RX ORDER — LOSARTAN POTASSIUM AND HYDROCHLOROTHIAZIDE 12.5; 5 MG/1; MG/1
1 TABLET ORAL DAILY
Qty: 30 TABLET | Refills: 0 | Status: SHIPPED | OUTPATIENT
Start: 2021-03-03 | End: 2021-03-25

## 2021-03-03 RX ORDER — AMLODIPINE BESYLATE 5 MG/1
5 TABLET ORAL DAILY
COMMUNITY
End: 2021-03-03

## 2021-03-03 RX ORDER — FLUTICASONE PROPIONATE 50 MCG
1 SPRAY, SUSPENSION (ML) NASAL DAILY
COMMUNITY
Start: 2021-03-01 | End: 2021-09-16

## 2021-03-08 ENCOUNTER — TELEPHONE (OUTPATIENT)
Dept: PRIMARY CARE CLINIC | Facility: CLINIC | Age: 51
End: 2021-03-08

## 2021-03-08 ENCOUNTER — CLINICAL SUPPORT (OUTPATIENT)
Dept: PRIMARY CARE CLINIC | Facility: CLINIC | Age: 51
End: 2021-03-08
Payer: COMMERCIAL

## 2021-03-08 VITALS — SYSTOLIC BLOOD PRESSURE: 128 MMHG | DIASTOLIC BLOOD PRESSURE: 88 MMHG | HEART RATE: 80 BPM

## 2021-03-08 DIAGNOSIS — R53.83 FATIGUE, UNSPECIFIED TYPE: Primary | ICD-10-CM

## 2021-03-08 PROCEDURE — 99999 PR PBB SHADOW E&M-EST. PATIENT-LVL I: ICD-10-PCS | Mod: PBBFAC,,,

## 2021-03-08 PROCEDURE — 99999 PR PBB SHADOW E&M-EST. PATIENT-LVL I: CPT | Mod: PBBFAC,,,

## 2021-03-09 DIAGNOSIS — E78.2 MIXED HYPERLIPIDEMIA: Primary | ICD-10-CM

## 2021-03-23 ENCOUNTER — PATIENT OUTREACH (OUTPATIENT)
Dept: ADMINISTRATIVE | Facility: OTHER | Age: 51
End: 2021-03-23

## 2021-03-26 ENCOUNTER — PATIENT MESSAGE (OUTPATIENT)
Dept: PRIMARY CARE CLINIC | Facility: CLINIC | Age: 51
End: 2021-03-26

## 2021-03-26 RX ORDER — PANTOPRAZOLE SODIUM 20 MG/1
20 TABLET, DELAYED RELEASE ORAL DAILY
Qty: 90 TABLET | Refills: 1 | Status: SHIPPED | OUTPATIENT
Start: 2021-03-26 | End: 2021-05-25

## 2021-04-06 ENCOUNTER — PATIENT MESSAGE (OUTPATIENT)
Dept: PRIMARY CARE CLINIC | Facility: CLINIC | Age: 51
End: 2021-04-06

## 2021-04-07 ENCOUNTER — PATIENT MESSAGE (OUTPATIENT)
Dept: PRIMARY CARE CLINIC | Facility: CLINIC | Age: 51
End: 2021-04-07

## 2021-04-07 ENCOUNTER — PATIENT MESSAGE (OUTPATIENT)
Dept: UROLOGY | Facility: CLINIC | Age: 51
End: 2021-04-07

## 2021-04-07 DIAGNOSIS — R79.89 LOW TESTOSTERONE: Primary | ICD-10-CM

## 2021-04-13 ENCOUNTER — OFFICE VISIT (OUTPATIENT)
Dept: UROLOGY | Facility: CLINIC | Age: 51
End: 2021-04-13
Payer: COMMERCIAL

## 2021-04-13 VITALS
SYSTOLIC BLOOD PRESSURE: 139 MMHG | WEIGHT: 212.06 LBS | BODY MASS INDEX: 30.36 KG/M2 | DIASTOLIC BLOOD PRESSURE: 84 MMHG | HEIGHT: 70 IN | HEART RATE: 107 BPM

## 2021-04-13 DIAGNOSIS — R53.83 OTHER FATIGUE: Primary | ICD-10-CM

## 2021-04-13 DIAGNOSIS — R79.89 LOW TESTOSTERONE: ICD-10-CM

## 2021-04-13 PROCEDURE — 99214 OFFICE O/P EST MOD 30 MIN: CPT | Mod: S$GLB,,, | Performed by: NURSE PRACTITIONER

## 2021-04-13 PROCEDURE — 99999 PR PBB SHADOW E&M-EST. PATIENT-LVL IV: ICD-10-PCS | Mod: PBBFAC,,, | Performed by: NURSE PRACTITIONER

## 2021-04-13 PROCEDURE — 99999 PR PBB SHADOW E&M-EST. PATIENT-LVL IV: CPT | Mod: PBBFAC,,, | Performed by: NURSE PRACTITIONER

## 2021-04-13 PROCEDURE — 99214 PR OFFICE/OUTPT VISIT, EST, LEVL IV, 30-39 MIN: ICD-10-PCS | Mod: S$GLB,,, | Performed by: NURSE PRACTITIONER

## 2021-04-13 RX ORDER — AMLODIPINE BESYLATE 5 MG/1
5 TABLET ORAL DAILY
COMMUNITY
Start: 2021-03-07 | End: 2021-08-16

## 2021-04-16 ENCOUNTER — PATIENT MESSAGE (OUTPATIENT)
Dept: RESEARCH | Facility: HOSPITAL | Age: 51
End: 2021-04-16

## 2021-05-12 ENCOUNTER — OFFICE VISIT (OUTPATIENT)
Dept: UROLOGY | Facility: CLINIC | Age: 51
End: 2021-05-12
Payer: COMMERCIAL

## 2021-05-12 DIAGNOSIS — R53.83 OTHER FATIGUE: Primary | ICD-10-CM

## 2021-05-12 DIAGNOSIS — N41.1 CHRONIC PROSTATITIS: ICD-10-CM

## 2021-05-12 DIAGNOSIS — R79.89 LOW TESTOSTERONE: ICD-10-CM

## 2021-05-12 PROCEDURE — 99441 PR PHYSICIAN TELEPHONE EVALUATION 5-10 MIN: CPT | Mod: 95,,, | Performed by: NURSE PRACTITIONER

## 2021-05-12 PROCEDURE — 99441 PR PHYSICIAN TELEPHONE EVALUATION 5-10 MIN: ICD-10-PCS | Mod: 95,,, | Performed by: NURSE PRACTITIONER

## 2021-05-20 ENCOUNTER — PATIENT MESSAGE (OUTPATIENT)
Dept: GASTROENTEROLOGY | Facility: CLINIC | Age: 51
End: 2021-05-20

## 2021-05-25 ENCOUNTER — TELEPHONE (OUTPATIENT)
Dept: PRIMARY CARE CLINIC | Facility: CLINIC | Age: 51
End: 2021-05-25

## 2021-05-25 ENCOUNTER — OFFICE VISIT (OUTPATIENT)
Dept: PRIMARY CARE CLINIC | Facility: CLINIC | Age: 51
End: 2021-05-25
Payer: COMMERCIAL

## 2021-05-25 VITALS
SYSTOLIC BLOOD PRESSURE: 130 MMHG | BODY MASS INDEX: 30.46 KG/M2 | HEIGHT: 70 IN | DIASTOLIC BLOOD PRESSURE: 88 MMHG | WEIGHT: 212.75 LBS | HEART RATE: 102 BPM | RESPIRATION RATE: 18 BRPM | OXYGEN SATURATION: 97 %

## 2021-05-25 DIAGNOSIS — E78.2 MIXED HYPERLIPIDEMIA: ICD-10-CM

## 2021-05-25 DIAGNOSIS — M54.16 LUMBAR RADICULOPATHY: Primary | ICD-10-CM

## 2021-05-25 DIAGNOSIS — G43.719 CHRONIC MIGRAINE WITHOUT AURA, INTRACTABLE, WITHOUT STATUS MIGRAINOSUS: ICD-10-CM

## 2021-05-25 PROCEDURE — 99214 PR OFFICE/OUTPT VISIT, EST, LEVL IV, 30-39 MIN: ICD-10-PCS | Mod: S$GLB,,, | Performed by: FAMILY MEDICINE

## 2021-05-25 PROCEDURE — 99214 OFFICE O/P EST MOD 30 MIN: CPT | Mod: S$GLB,,, | Performed by: FAMILY MEDICINE

## 2021-05-25 PROCEDURE — 99999 PR PBB SHADOW E&M-EST. PATIENT-LVL IV: ICD-10-PCS | Mod: PBBFAC,,, | Performed by: FAMILY MEDICINE

## 2021-05-25 PROCEDURE — 99999 PR PBB SHADOW E&M-EST. PATIENT-LVL IV: CPT | Mod: PBBFAC,,, | Performed by: FAMILY MEDICINE

## 2021-05-25 RX ORDER — DIAZEPAM 10 MG/1
10 TABLET ORAL ONCE
Qty: 1 TABLET | Refills: 0 | Status: SHIPPED | OUTPATIENT
Start: 2021-05-25 | End: 2021-05-25

## 2021-05-25 RX ORDER — PANTOPRAZOLE SODIUM 40 MG/1
40 TABLET, DELAYED RELEASE ORAL DAILY
Qty: 90 TABLET | Refills: 1 | Status: SHIPPED | OUTPATIENT
Start: 2021-05-25 | End: 2021-09-16

## 2021-06-03 ENCOUNTER — TELEPHONE (OUTPATIENT)
Dept: UROLOGY | Facility: CLINIC | Age: 51
End: 2021-06-03

## 2021-06-07 ENCOUNTER — OFFICE VISIT (OUTPATIENT)
Dept: ORTHOPEDICS | Facility: CLINIC | Age: 51
End: 2021-06-07
Payer: COMMERCIAL

## 2021-06-07 VITALS — BODY MASS INDEX: 30.75 KG/M2 | HEIGHT: 70 IN | WEIGHT: 214.81 LBS

## 2021-06-07 DIAGNOSIS — M43.10 SPONDYLOLISTHESIS, UNSPECIFIED SPINAL REGION: ICD-10-CM

## 2021-06-07 DIAGNOSIS — M54.16 LUMBAR RADICULOPATHY: ICD-10-CM

## 2021-06-07 PROCEDURE — 99999 PR PBB SHADOW E&M-EST. PATIENT-LVL III: CPT | Mod: PBBFAC,,, | Performed by: ORTHOPAEDIC SURGERY

## 2021-06-07 PROCEDURE — 99999 PR PBB SHADOW E&M-EST. PATIENT-LVL III: ICD-10-PCS | Mod: PBBFAC,,, | Performed by: ORTHOPAEDIC SURGERY

## 2021-06-07 PROCEDURE — 99214 PR OFFICE/OUTPT VISIT, EST, LEVL IV, 30-39 MIN: ICD-10-PCS | Mod: S$GLB,,, | Performed by: ORTHOPAEDIC SURGERY

## 2021-06-07 PROCEDURE — 99214 OFFICE O/P EST MOD 30 MIN: CPT | Mod: S$GLB,,, | Performed by: ORTHOPAEDIC SURGERY

## 2021-06-07 RX ORDER — DIAZEPAM 5 MG/1
5 TABLET ORAL
Qty: 1 TABLET | Refills: 0 | Status: SHIPPED | OUTPATIENT
Start: 2021-06-07 | End: 2021-07-26

## 2021-06-23 ENCOUNTER — OFFICE VISIT (OUTPATIENT)
Dept: ORTHOPEDICS | Facility: CLINIC | Age: 51
End: 2021-06-23
Payer: COMMERCIAL

## 2021-06-23 ENCOUNTER — HOSPITAL ENCOUNTER (OUTPATIENT)
Dept: RADIOLOGY | Facility: HOSPITAL | Age: 51
Discharge: HOME | End: 2021-06-23
Attending: ORTHOPAEDIC SURGERY
Payer: COMMERCIAL

## 2021-06-23 DIAGNOSIS — M51.36 DDD (DEGENERATIVE DISC DISEASE), LUMBAR: Primary | ICD-10-CM

## 2021-06-23 DIAGNOSIS — M43.10 SPONDYLOLISTHESIS, UNSPECIFIED SPINAL REGION: ICD-10-CM

## 2021-06-23 PROCEDURE — 99999 PR PBB SHADOW E&M-EST. PATIENT-LVL III: ICD-10-PCS | Mod: PBBFAC,,, | Performed by: ORTHOPAEDIC SURGERY

## 2021-06-23 PROCEDURE — 72148 MRI LUMBAR SPINE WITHOUT CONTRAST: ICD-10-PCS | Mod: 26,,, | Performed by: RADIOLOGY

## 2021-06-23 PROCEDURE — 99214 OFFICE O/P EST MOD 30 MIN: CPT | Mod: S$GLB,,, | Performed by: ORTHOPAEDIC SURGERY

## 2021-06-23 PROCEDURE — 99999 PR PBB SHADOW E&M-EST. PATIENT-LVL III: CPT | Mod: PBBFAC,,, | Performed by: ORTHOPAEDIC SURGERY

## 2021-06-23 PROCEDURE — 72148 MRI LUMBAR SPINE W/O DYE: CPT | Mod: 26,,, | Performed by: RADIOLOGY

## 2021-06-23 PROCEDURE — 72148 MRI LUMBAR SPINE W/O DYE: CPT | Mod: TC

## 2021-06-23 PROCEDURE — 99214 PR OFFICE/OUTPT VISIT, EST, LEVL IV, 30-39 MIN: ICD-10-PCS | Mod: S$GLB,,, | Performed by: ORTHOPAEDIC SURGERY

## 2021-06-28 ENCOUNTER — TELEPHONE (OUTPATIENT)
Dept: INTERVENTIONAL RADIOLOGY/VASCULAR | Facility: HOSPITAL | Age: 51
End: 2021-06-28

## 2021-07-06 ENCOUNTER — TELEPHONE (OUTPATIENT)
Dept: NEUROSURGERY | Facility: CLINIC | Age: 51
End: 2021-07-06

## 2021-07-15 ENCOUNTER — OFFICE VISIT (OUTPATIENT)
Dept: NEUROSURGERY | Facility: CLINIC | Age: 51
End: 2021-07-15
Payer: COMMERCIAL

## 2021-07-15 VITALS
WEIGHT: 210 LBS | HEART RATE: 99 BPM | HEIGHT: 70 IN | SYSTOLIC BLOOD PRESSURE: 133 MMHG | TEMPERATURE: 98 F | DIASTOLIC BLOOD PRESSURE: 93 MMHG | BODY MASS INDEX: 30.06 KG/M2

## 2021-07-15 DIAGNOSIS — M54.42 CHRONIC BILATERAL LOW BACK PAIN WITH BILATERAL SCIATICA: ICD-10-CM

## 2021-07-15 DIAGNOSIS — R10.2 PELVIC PAIN: Primary | ICD-10-CM

## 2021-07-15 DIAGNOSIS — G89.29 CHRONIC BILATERAL LOW BACK PAIN WITH BILATERAL SCIATICA: ICD-10-CM

## 2021-07-15 DIAGNOSIS — M54.41 CHRONIC BILATERAL LOW BACK PAIN WITH BILATERAL SCIATICA: ICD-10-CM

## 2021-07-15 DIAGNOSIS — G89.4 CHRONIC PAIN DISORDER: ICD-10-CM

## 2021-07-15 DIAGNOSIS — M54.16 LUMBAR RADICULOPATHY: ICD-10-CM

## 2021-07-15 PROCEDURE — 99999 PR PBB SHADOW E&M-EST. PATIENT-LVL III: ICD-10-PCS | Mod: PBBFAC,,, | Performed by: NEUROLOGICAL SURGERY

## 2021-07-15 PROCEDURE — 99999 PR PBB SHADOW E&M-EST. PATIENT-LVL III: CPT | Mod: PBBFAC,,, | Performed by: NEUROLOGICAL SURGERY

## 2021-07-15 PROCEDURE — 99204 PR OFFICE/OUTPT VISIT, NEW, LEVL IV, 45-59 MIN: ICD-10-PCS | Mod: S$GLB,,, | Performed by: NEUROLOGICAL SURGERY

## 2021-07-15 PROCEDURE — 99204 OFFICE O/P NEW MOD 45 MIN: CPT | Mod: S$GLB,,, | Performed by: NEUROLOGICAL SURGERY

## 2021-07-26 ENCOUNTER — OFFICE VISIT (OUTPATIENT)
Dept: PRIMARY CARE CLINIC | Facility: CLINIC | Age: 51
End: 2021-07-26
Payer: COMMERCIAL

## 2021-07-26 VITALS
DIASTOLIC BLOOD PRESSURE: 88 MMHG | HEIGHT: 70 IN | SYSTOLIC BLOOD PRESSURE: 138 MMHG | OXYGEN SATURATION: 97 % | RESPIRATION RATE: 18 BRPM | BODY MASS INDEX: 30.62 KG/M2 | HEART RATE: 94 BPM | WEIGHT: 213.88 LBS

## 2021-07-26 DIAGNOSIS — G89.29 CHRONIC PELVIC PAIN IN MALE: Primary | ICD-10-CM

## 2021-07-26 DIAGNOSIS — R10.2 CHRONIC PELVIC PAIN IN MALE: Primary | ICD-10-CM

## 2021-07-26 LAB
BILIRUB SERPL-MCNC: NORMAL MG/DL
BLOOD URINE, POC: NORMAL
COLOR, POC UA: YELLOW
GLUCOSE UR QL STRIP: NORMAL
KETONES UR QL STRIP: NORMAL
LEUKOCYTE ESTERASE URINE, POC: NORMAL
NITRITE, POC UA: NORMAL
PH, POC UA: 5
PROTEIN, POC: NORMAL
SPECIFIC GRAVITY, POC UA: 1.02
UROBILINOGEN, POC UA: NORMAL

## 2021-07-26 PROCEDURE — 99213 OFFICE O/P EST LOW 20 MIN: CPT | Mod: 25,S$GLB,, | Performed by: FAMILY MEDICINE

## 2021-07-26 PROCEDURE — 81001 URINALYSIS AUTO W/SCOPE: CPT | Mod: S$GLB,,, | Performed by: FAMILY MEDICINE

## 2021-07-26 PROCEDURE — 81001 POCT URINALYSIS, DIPSTICK OR TABLET REAGENT, AUTOMATED, WITH MICROSCOP: ICD-10-PCS | Mod: S$GLB,,, | Performed by: FAMILY MEDICINE

## 2021-07-26 PROCEDURE — 99213 PR OFFICE/OUTPT VISIT, EST, LEVL III, 20-29 MIN: ICD-10-PCS | Mod: 25,S$GLB,, | Performed by: FAMILY MEDICINE

## 2021-07-26 PROCEDURE — 99999 PR PBB SHADOW E&M-EST. PATIENT-LVL III: CPT | Mod: PBBFAC,,, | Performed by: FAMILY MEDICINE

## 2021-07-26 PROCEDURE — 99999 PR PBB SHADOW E&M-EST. PATIENT-LVL III: ICD-10-PCS | Mod: PBBFAC,,, | Performed by: FAMILY MEDICINE

## 2021-08-06 ENCOUNTER — OFFICE VISIT (OUTPATIENT)
Dept: UROLOGY | Facility: CLINIC | Age: 51
End: 2021-08-06
Payer: COMMERCIAL

## 2021-08-06 DIAGNOSIS — R39.11 URINARY HESITANCY: Primary | ICD-10-CM

## 2021-08-06 DIAGNOSIS — R10.2 PELVIC PAIN IN MALE: ICD-10-CM

## 2021-08-06 DIAGNOSIS — N41.0 ACUTE PROSTATITIS: ICD-10-CM

## 2021-08-06 PROCEDURE — 99213 OFFICE O/P EST LOW 20 MIN: CPT | Mod: 95,,, | Performed by: NURSE PRACTITIONER

## 2021-08-06 PROCEDURE — 99213 PR OFFICE/OUTPT VISIT, EST, LEVL III, 20-29 MIN: ICD-10-PCS | Mod: 95,,, | Performed by: NURSE PRACTITIONER

## 2021-08-06 RX ORDER — CIPROFLOXACIN 500 MG/1
500 TABLET ORAL EVERY 12 HOURS
Qty: 14 TABLET | Refills: 0 | Status: SHIPPED | OUTPATIENT
Start: 2021-08-06 | End: 2021-08-13

## 2021-08-06 RX ORDER — IBUPROFEN 600 MG/1
600 TABLET ORAL 3 TIMES DAILY
Qty: 30 TABLET | Refills: 0 | Status: SHIPPED | OUTPATIENT
Start: 2021-08-06 | End: 2021-09-16

## 2021-08-06 RX ORDER — TAMSULOSIN HYDROCHLORIDE 0.4 MG/1
0.4 CAPSULE ORAL DAILY
Qty: 30 CAPSULE | Refills: 11 | Status: SHIPPED | OUTPATIENT
Start: 2021-08-06 | End: 2021-09-16

## 2021-08-06 RX ORDER — FINASTERIDE 5 MG/1
5 TABLET, FILM COATED ORAL DAILY
Qty: 30 TABLET | Refills: 11 | Status: SHIPPED | OUTPATIENT
Start: 2021-08-06 | End: 2021-11-19

## 2021-08-09 ENCOUNTER — PATIENT MESSAGE (OUTPATIENT)
Dept: UROLOGY | Facility: CLINIC | Age: 51
End: 2021-08-09

## 2021-08-11 ENCOUNTER — TELEPHONE (OUTPATIENT)
Dept: ENDOSCOPY | Facility: HOSPITAL | Age: 51
End: 2021-08-11

## 2021-08-11 ENCOUNTER — PATIENT OUTREACH (OUTPATIENT)
Dept: ADMINISTRATIVE | Facility: OTHER | Age: 51
End: 2021-08-11

## 2021-08-13 DIAGNOSIS — G89.29 OTHER CHRONIC PAIN: Primary | ICD-10-CM

## 2021-08-13 DIAGNOSIS — R10.2 PELVIC PAIN IN MALE: ICD-10-CM

## 2021-08-16 ENCOUNTER — OFFICE VISIT (OUTPATIENT)
Dept: UROLOGY | Facility: CLINIC | Age: 51
End: 2021-08-16
Payer: COMMERCIAL

## 2021-08-16 DIAGNOSIS — G89.29 OTHER CHRONIC PAIN: Primary | ICD-10-CM

## 2021-08-16 DIAGNOSIS — R10.2 PELVIC PAIN IN MALE: ICD-10-CM

## 2021-08-16 PROCEDURE — 99443 PR PHYSICIAN TELEPHONE EVALUATION 21-30 MIN: CPT | Mod: 95,,, | Performed by: NURSE PRACTITIONER

## 2021-08-16 PROCEDURE — 99443 PR PHYSICIAN TELEPHONE EVALUATION 21-30 MIN: ICD-10-PCS | Mod: 95,,, | Performed by: NURSE PRACTITIONER

## 2021-08-16 RX ORDER — CIPROFLOXACIN 500 MG/1
500 TABLET ORAL EVERY 12 HOURS
Qty: 28 TABLET | Refills: 0 | Status: SHIPPED | OUTPATIENT
Start: 2021-08-16 | End: 2021-08-23

## 2021-08-17 ENCOUNTER — PATIENT MESSAGE (OUTPATIENT)
Dept: ENDOSCOPY | Facility: HOSPITAL | Age: 51
End: 2021-08-17

## 2021-08-19 ENCOUNTER — OFFICE VISIT (OUTPATIENT)
Dept: GASTROENTEROLOGY | Facility: CLINIC | Age: 51
End: 2021-08-19
Payer: COMMERCIAL

## 2021-08-19 DIAGNOSIS — R10.32 LEFT LOWER QUADRANT PAIN: Primary | ICD-10-CM

## 2021-08-19 PROCEDURE — 99215 OFFICE O/P EST HI 40 MIN: CPT | Mod: 95,,, | Performed by: STUDENT IN AN ORGANIZED HEALTH CARE EDUCATION/TRAINING PROGRAM

## 2021-08-19 PROCEDURE — 99215 PR OFFICE/OUTPT VISIT, EST, LEVL V, 40-54 MIN: ICD-10-PCS | Mod: 95,,, | Performed by: STUDENT IN AN ORGANIZED HEALTH CARE EDUCATION/TRAINING PROGRAM

## 2021-08-27 ENCOUNTER — HOSPITAL ENCOUNTER (OUTPATIENT)
Dept: RADIOLOGY | Facility: HOSPITAL | Age: 51
Discharge: HOME | End: 2021-08-27
Attending: STUDENT IN AN ORGANIZED HEALTH CARE EDUCATION/TRAINING PROGRAM
Payer: COMMERCIAL

## 2021-08-27 DIAGNOSIS — R10.32 LEFT LOWER QUADRANT PAIN: ICD-10-CM

## 2021-08-27 LAB
CREAT SERPL-MCNC: 1.1 MG/DL (ref 0.5–1.4)
SAMPLE: NORMAL

## 2021-08-27 PROCEDURE — 74177 CT ABD & PELVIS W/CONTRAST: CPT | Mod: 26,,, | Performed by: RADIOLOGY

## 2021-08-27 PROCEDURE — 25500020 PHARM REV CODE 255: Performed by: STUDENT IN AN ORGANIZED HEALTH CARE EDUCATION/TRAINING PROGRAM

## 2021-08-27 PROCEDURE — 74177 CT ABD & PELVIS W/CONTRAST: CPT | Mod: TC

## 2021-08-27 PROCEDURE — 74177 CT ABDOMEN PELVIS WITH CONTRAST: ICD-10-PCS | Mod: 26,,, | Performed by: RADIOLOGY

## 2021-08-27 RX ADMIN — IOHEXOL 100 ML: 350 INJECTION, SOLUTION INTRAVENOUS at 03:08

## 2021-08-28 DIAGNOSIS — R10.9 ABDOMINAL PAIN, UNSPECIFIED ABDOMINAL LOCATION: ICD-10-CM

## 2021-08-28 DIAGNOSIS — R79.89 LFT ELEVATION: Primary | ICD-10-CM

## 2021-08-28 DIAGNOSIS — R59.1 LYMPHADENOPATHY: Primary | ICD-10-CM

## 2021-09-03 ENCOUNTER — PATIENT MESSAGE (OUTPATIENT)
Dept: NEUROSURGERY | Facility: CLINIC | Age: 51
End: 2021-09-03

## 2021-09-07 ENCOUNTER — TELEPHONE (OUTPATIENT)
Dept: ENDOSCOPY | Facility: HOSPITAL | Age: 51
End: 2021-09-07

## 2021-09-09 ENCOUNTER — TELEPHONE (OUTPATIENT)
Dept: HEMATOLOGY/ONCOLOGY | Facility: CLINIC | Age: 51
End: 2021-09-09

## 2021-09-16 ENCOUNTER — OFFICE VISIT (OUTPATIENT)
Dept: GASTROENTEROLOGY | Facility: CLINIC | Age: 51
End: 2021-09-16
Payer: COMMERCIAL

## 2021-09-16 VITALS
TEMPERATURE: 98 F | HEART RATE: 80 BPM | HEIGHT: 70 IN | WEIGHT: 207.44 LBS | BODY MASS INDEX: 29.7 KG/M2 | OXYGEN SATURATION: 100 % | DIASTOLIC BLOOD PRESSURE: 89 MMHG | SYSTOLIC BLOOD PRESSURE: 142 MMHG

## 2021-09-16 DIAGNOSIS — R10.9 ABDOMINAL PAIN, UNSPECIFIED ABDOMINAL LOCATION: Primary | ICD-10-CM

## 2021-09-16 PROCEDURE — 99213 OFFICE O/P EST LOW 20 MIN: CPT | Mod: S$GLB,,, | Performed by: STUDENT IN AN ORGANIZED HEALTH CARE EDUCATION/TRAINING PROGRAM

## 2021-09-16 PROCEDURE — 99999 PR PBB SHADOW E&M-EST. PATIENT-LVL IV: CPT | Mod: PBBFAC,,, | Performed by: STUDENT IN AN ORGANIZED HEALTH CARE EDUCATION/TRAINING PROGRAM

## 2021-09-16 PROCEDURE — 99999 PR PBB SHADOW E&M-EST. PATIENT-LVL IV: ICD-10-PCS | Mod: PBBFAC,,, | Performed by: STUDENT IN AN ORGANIZED HEALTH CARE EDUCATION/TRAINING PROGRAM

## 2021-09-16 PROCEDURE — 99213 PR OFFICE/OUTPT VISIT, EST, LEVL III, 20-29 MIN: ICD-10-PCS | Mod: S$GLB,,, | Performed by: STUDENT IN AN ORGANIZED HEALTH CARE EDUCATION/TRAINING PROGRAM

## 2021-09-16 RX ORDER — ACETAMINOPHEN 500 MG
5000 TABLET ORAL DAILY
COMMUNITY
End: 2023-09-07

## 2021-09-16 RX ORDER — PANTOPRAZOLE SODIUM 20 MG/1
TABLET, DELAYED RELEASE ORAL
COMMUNITY
Start: 2021-08-22 | End: 2022-05-18

## 2021-09-16 RX ORDER — IBUPROFEN 100 MG/5ML
1000 SUSPENSION, ORAL (FINAL DOSE FORM) ORAL DAILY
COMMUNITY
End: 2023-09-07

## 2021-09-16 RX ORDER — CARTILAGE/COLLAGEN/BOR/HYALUR 40-10-5 MG
TABLET ORAL
COMMUNITY
End: 2023-09-07

## 2021-09-16 RX ORDER — ZINC GLUCONATE 100 MG
TABLET ORAL
COMMUNITY
End: 2023-09-07

## 2021-09-24 ENCOUNTER — LAB VISIT (OUTPATIENT)
Dept: LAB | Facility: OTHER | Age: 51
End: 2021-09-24
Attending: STUDENT IN AN ORGANIZED HEALTH CARE EDUCATION/TRAINING PROGRAM
Payer: COMMERCIAL

## 2021-09-24 ENCOUNTER — OFFICE VISIT (OUTPATIENT)
Dept: HEMATOLOGY/ONCOLOGY | Facility: CLINIC | Age: 51
End: 2021-09-24
Payer: COMMERCIAL

## 2021-09-24 VITALS
SYSTOLIC BLOOD PRESSURE: 134 MMHG | DIASTOLIC BLOOD PRESSURE: 87 MMHG | RESPIRATION RATE: 17 BRPM | HEART RATE: 81 BPM | WEIGHT: 208.56 LBS | BODY MASS INDEX: 29.86 KG/M2 | OXYGEN SATURATION: 99 % | TEMPERATURE: 98 F | HEIGHT: 70 IN

## 2021-09-24 DIAGNOSIS — I70.0 AORTIC ATHEROSCLEROSIS: ICD-10-CM

## 2021-09-24 DIAGNOSIS — R59.1 LYMPHADENOPATHY: Primary | ICD-10-CM

## 2021-09-24 DIAGNOSIS — R59.1 LYMPHADENOPATHY: ICD-10-CM

## 2021-09-24 DIAGNOSIS — K76.0 FATTY LIVER: ICD-10-CM

## 2021-09-24 DIAGNOSIS — R10.9 ABDOMINAL PAIN, UNSPECIFIED ABDOMINAL LOCATION: ICD-10-CM

## 2021-09-24 PROBLEM — M62.89 PELVIC FLOOR DYSFUNCTION: Status: ACTIVE | Noted: 2021-09-24

## 2021-09-24 LAB
ALBUMIN SERPL BCP-MCNC: 4.1 G/DL (ref 3.5–5.2)
ALP SERPL-CCNC: 70 U/L (ref 55–135)
ALT SERPL W/O P-5'-P-CCNC: 45 U/L (ref 10–44)
ANION GAP SERPL CALC-SCNC: 9 MMOL/L (ref 8–16)
AST SERPL-CCNC: 28 U/L (ref 10–40)
B2 MICROGLOB SERPL-MCNC: 1.3 UG/ML (ref 0–2.5)
BASOPHILS # BLD AUTO: 0.02 K/UL (ref 0–0.2)
BASOPHILS NFR BLD: 0.4 % (ref 0–1.9)
BILIRUB SERPL-MCNC: 0.7 MG/DL (ref 0.1–1)
BUN SERPL-MCNC: 15 MG/DL (ref 6–20)
CALCIUM SERPL-MCNC: 9.7 MG/DL (ref 8.7–10.5)
CHLORIDE SERPL-SCNC: 105 MMOL/L (ref 95–110)
CO2 SERPL-SCNC: 25 MMOL/L (ref 23–29)
CREAT SERPL-MCNC: 0.9 MG/DL (ref 0.5–1.4)
CRP SERPL-MCNC: 1.5 MG/L (ref 0–8.2)
DIFFERENTIAL METHOD: NORMAL
EOSINOPHIL # BLD AUTO: 0.1 K/UL (ref 0–0.5)
EOSINOPHIL NFR BLD: 2.7 % (ref 0–8)
ERYTHROCYTE [DISTWIDTH] IN BLOOD BY AUTOMATED COUNT: 11.6 % (ref 11.5–14.5)
ERYTHROCYTE [SEDIMENTATION RATE] IN BLOOD: 3 MM/HR (ref 0–10)
EST. GFR  (AFRICAN AMERICAN): >60 ML/MIN/1.73 M^2
EST. GFR  (NON AFRICAN AMERICAN): >60 ML/MIN/1.73 M^2
GLUCOSE SERPL-MCNC: 92 MG/DL (ref 70–110)
HCT VFR BLD AUTO: 46.8 % (ref 40–54)
HGB BLD-MCNC: 16.8 G/DL (ref 14–18)
IMM GRANULOCYTES # BLD AUTO: 0.01 K/UL (ref 0–0.04)
IMM GRANULOCYTES NFR BLD AUTO: 0.2 % (ref 0–0.5)
LDH SERPL L TO P-CCNC: 141 U/L (ref 110–260)
LYMPHOCYTES # BLD AUTO: 1.4 K/UL (ref 1–4.8)
LYMPHOCYTES NFR BLD: 29.2 % (ref 18–48)
MCH RBC QN AUTO: 30.2 PG (ref 27–31)
MCHC RBC AUTO-ENTMCNC: 35.9 G/DL (ref 32–36)
MCV RBC AUTO: 84 FL (ref 82–98)
MONOCYTES # BLD AUTO: 0.3 K/UL (ref 0.3–1)
MONOCYTES NFR BLD: 6.2 % (ref 4–15)
NEUTROPHILS # BLD AUTO: 3 K/UL (ref 1.8–7.7)
NEUTROPHILS NFR BLD: 61.3 % (ref 38–73)
NRBC BLD-RTO: 0 /100 WBC
PLATELET # BLD AUTO: 243 K/UL (ref 150–450)
PMV BLD AUTO: 9.4 FL (ref 9.2–12.9)
POTASSIUM SERPL-SCNC: 4.2 MMOL/L (ref 3.5–5.1)
PROT SERPL-MCNC: 7.2 G/DL (ref 6–8.4)
RBC # BLD AUTO: 5.56 M/UL (ref 4.6–6.2)
SODIUM SERPL-SCNC: 139 MMOL/L (ref 136–145)
URATE SERPL-MCNC: 6.3 MG/DL (ref 3.4–7)
WBC # BLD AUTO: 4.87 K/UL (ref 3.9–12.7)

## 2021-09-24 PROCEDURE — 82232 ASSAY OF BETA-2 PROTEIN: CPT | Performed by: STUDENT IN AN ORGANIZED HEALTH CARE EDUCATION/TRAINING PROGRAM

## 2021-09-24 PROCEDURE — 84550 ASSAY OF BLOOD/URIC ACID: CPT | Performed by: STUDENT IN AN ORGANIZED HEALTH CARE EDUCATION/TRAINING PROGRAM

## 2021-09-24 PROCEDURE — 85025 COMPLETE CBC W/AUTO DIFF WBC: CPT | Performed by: STUDENT IN AN ORGANIZED HEALTH CARE EDUCATION/TRAINING PROGRAM

## 2021-09-24 PROCEDURE — 87340 HEPATITIS B SURFACE AG IA: CPT | Performed by: STUDENT IN AN ORGANIZED HEALTH CARE EDUCATION/TRAINING PROGRAM

## 2021-09-24 PROCEDURE — 86140 C-REACTIVE PROTEIN: CPT | Performed by: STUDENT IN AN ORGANIZED HEALTH CARE EDUCATION/TRAINING PROGRAM

## 2021-09-24 PROCEDURE — 36415 COLL VENOUS BLD VENIPUNCTURE: CPT | Performed by: STUDENT IN AN ORGANIZED HEALTH CARE EDUCATION/TRAINING PROGRAM

## 2021-09-24 PROCEDURE — 86706 HEP B SURFACE ANTIBODY: CPT | Performed by: STUDENT IN AN ORGANIZED HEALTH CARE EDUCATION/TRAINING PROGRAM

## 2021-09-24 PROCEDURE — 99999 PR PBB SHADOW E&M-EST. PATIENT-LVL V: CPT | Mod: PBBFAC,,, | Performed by: STUDENT IN AN ORGANIZED HEALTH CARE EDUCATION/TRAINING PROGRAM

## 2021-09-24 PROCEDURE — 99999 PR PBB SHADOW E&M-EST. PATIENT-LVL V: ICD-10-PCS | Mod: PBBFAC,,, | Performed by: STUDENT IN AN ORGANIZED HEALTH CARE EDUCATION/TRAINING PROGRAM

## 2021-09-24 PROCEDURE — 80053 COMPREHEN METABOLIC PANEL: CPT | Performed by: STUDENT IN AN ORGANIZED HEALTH CARE EDUCATION/TRAINING PROGRAM

## 2021-09-24 PROCEDURE — 86704 HEP B CORE ANTIBODY TOTAL: CPT | Performed by: STUDENT IN AN ORGANIZED HEALTH CARE EDUCATION/TRAINING PROGRAM

## 2021-09-24 PROCEDURE — 99205 OFFICE O/P NEW HI 60 MIN: CPT | Mod: S$GLB,,, | Performed by: STUDENT IN AN ORGANIZED HEALTH CARE EDUCATION/TRAINING PROGRAM

## 2021-09-24 PROCEDURE — 83615 LACTATE (LD) (LDH) ENZYME: CPT | Performed by: STUDENT IN AN ORGANIZED HEALTH CARE EDUCATION/TRAINING PROGRAM

## 2021-09-24 PROCEDURE — 85651 RBC SED RATE NONAUTOMATED: CPT | Performed by: STUDENT IN AN ORGANIZED HEALTH CARE EDUCATION/TRAINING PROGRAM

## 2021-09-24 PROCEDURE — 99205 PR OFFICE/OUTPT VISIT, NEW, LEVL V, 60-74 MIN: ICD-10-PCS | Mod: S$GLB,,, | Performed by: STUDENT IN AN ORGANIZED HEALTH CARE EDUCATION/TRAINING PROGRAM

## 2021-09-24 RX ORDER — FLUTICASONE PROPIONATE 50 MCG
1 SPRAY, SUSPENSION (ML) NASAL DAILY
COMMUNITY
End: 2023-09-07

## 2021-09-27 LAB
HBV CORE AB SERPL QL IA: NEGATIVE
HBV SURFACE AB SER-ACNC: POSITIVE M[IU]/ML
HBV SURFACE AG SERPL QL IA: NEGATIVE

## 2021-10-06 ENCOUNTER — TELEPHONE (OUTPATIENT)
Dept: HEMATOLOGY/ONCOLOGY | Facility: CLINIC | Age: 51
End: 2021-10-06

## 2021-10-08 ENCOUNTER — TELEPHONE (OUTPATIENT)
Dept: HEMATOLOGY/ONCOLOGY | Facility: CLINIC | Age: 51
End: 2021-10-08

## 2021-10-26 ENCOUNTER — PATIENT MESSAGE (OUTPATIENT)
Dept: PRIMARY CARE CLINIC | Facility: CLINIC | Age: 51
End: 2021-10-26
Payer: COMMERCIAL

## 2021-10-26 RX ORDER — DULOXETIN HYDROCHLORIDE 20 MG/1
20 CAPSULE, DELAYED RELEASE ORAL DAILY
Qty: 30 CAPSULE | Refills: 3 | Status: SHIPPED | OUTPATIENT
Start: 2021-10-26 | End: 2022-01-21

## 2021-10-27 ENCOUNTER — PATIENT MESSAGE (OUTPATIENT)
Dept: PRIMARY CARE CLINIC | Facility: CLINIC | Age: 51
End: 2021-10-27
Payer: COMMERCIAL

## 2021-11-03 ENCOUNTER — PATIENT MESSAGE (OUTPATIENT)
Dept: ORTHOPEDICS | Facility: CLINIC | Age: 51
End: 2021-11-03
Payer: COMMERCIAL

## 2021-11-03 DIAGNOSIS — M54.16 LUMBAR RADICULOPATHY: Primary | ICD-10-CM

## 2021-11-04 DIAGNOSIS — M54.16 LUMBAR RADICULOPATHY: Primary | ICD-10-CM

## 2021-11-12 ENCOUNTER — HOSPITAL ENCOUNTER (OUTPATIENT)
Facility: OTHER | Age: 51
Discharge: HOME | End: 2021-11-12
Attending: ANESTHESIOLOGY | Admitting: ANESTHESIOLOGY
Payer: COMMERCIAL

## 2021-11-12 VITALS
BODY MASS INDEX: 30.06 KG/M2 | HEIGHT: 70 IN | OXYGEN SATURATION: 99 % | TEMPERATURE: 99 F | WEIGHT: 210 LBS | DIASTOLIC BLOOD PRESSURE: 73 MMHG | RESPIRATION RATE: 16 BRPM | HEART RATE: 68 BPM | SYSTOLIC BLOOD PRESSURE: 160 MMHG

## 2021-11-12 DIAGNOSIS — G89.29 CHRONIC PAIN: ICD-10-CM

## 2021-11-12 DIAGNOSIS — M54.16 LUMBAR RADICULOPATHY: Primary | ICD-10-CM

## 2021-11-12 PROCEDURE — 63600175 PHARM REV CODE 636 W HCPCS: Performed by: ANESTHESIOLOGY

## 2021-11-12 PROCEDURE — 64483 NJX AA&/STRD TFRM EPI L/S 1: CPT | Mod: 50 | Performed by: ANESTHESIOLOGY

## 2021-11-12 PROCEDURE — 25000003 PHARM REV CODE 250: Performed by: ANESTHESIOLOGY

## 2021-11-12 PROCEDURE — 64483 PR EPIDURAL INJ, ANES/STEROID, TRANSFORAMINAL, LUMB/SACR, SNGL LEVL: ICD-10-PCS | Mod: 50,,, | Performed by: ANESTHESIOLOGY

## 2021-11-12 PROCEDURE — 64483 NJX AA&/STRD TFRM EPI L/S 1: CPT | Mod: 50,,, | Performed by: ANESTHESIOLOGY

## 2021-11-12 PROCEDURE — 25500020 PHARM REV CODE 255: Performed by: ANESTHESIOLOGY

## 2021-11-12 PROCEDURE — 25000003 PHARM REV CODE 250: Performed by: STUDENT IN AN ORGANIZED HEALTH CARE EDUCATION/TRAINING PROGRAM

## 2021-11-12 RX ORDER — DEXAMETHASONE SODIUM PHOSPHATE 10 MG/ML
INJECTION INTRAMUSCULAR; INTRAVENOUS
Status: DISCONTINUED | OUTPATIENT
Start: 2021-11-12 | End: 2021-11-12 | Stop reason: HOSPADM

## 2021-11-12 RX ORDER — MIDAZOLAM HYDROCHLORIDE 1 MG/ML
INJECTION INTRAMUSCULAR; INTRAVENOUS
Status: DISCONTINUED | OUTPATIENT
Start: 2021-11-12 | End: 2021-11-12 | Stop reason: HOSPADM

## 2021-11-12 RX ORDER — FENTANYL CITRATE 50 UG/ML
INJECTION, SOLUTION INTRAMUSCULAR; INTRAVENOUS
Status: DISCONTINUED | OUTPATIENT
Start: 2021-11-12 | End: 2021-11-12 | Stop reason: HOSPADM

## 2021-11-12 RX ORDER — SODIUM CHLORIDE 9 MG/ML
INJECTION, SOLUTION INTRAVENOUS CONTINUOUS
Status: DISCONTINUED | OUTPATIENT
Start: 2021-11-12 | End: 2021-11-12 | Stop reason: HOSPADM

## 2021-11-12 RX ORDER — LIDOCAINE HYDROCHLORIDE 10 MG/ML
INJECTION, SOLUTION EPIDURAL; INFILTRATION; INTRACAUDAL; PERINEURAL
Status: DISCONTINUED | OUTPATIENT
Start: 2021-11-12 | End: 2021-11-12 | Stop reason: HOSPADM

## 2021-11-12 RX ORDER — LIDOCAINE HYDROCHLORIDE 20 MG/ML
INJECTION, SOLUTION INFILTRATION; PERINEURAL
Status: DISCONTINUED | OUTPATIENT
Start: 2021-11-12 | End: 2021-11-12 | Stop reason: HOSPADM

## 2021-11-12 RX ADMIN — SODIUM CHLORIDE: 0.9 INJECTION, SOLUTION INTRAVENOUS at 02:11

## 2021-11-19 ENCOUNTER — OFFICE VISIT (OUTPATIENT)
Dept: UROLOGY | Facility: CLINIC | Age: 51
End: 2021-11-19
Payer: COMMERCIAL

## 2021-11-19 VITALS
HEART RATE: 69 BPM | SYSTOLIC BLOOD PRESSURE: 132 MMHG | HEIGHT: 70 IN | BODY MASS INDEX: 29.46 KG/M2 | WEIGHT: 205.81 LBS | DIASTOLIC BLOOD PRESSURE: 78 MMHG

## 2021-11-19 DIAGNOSIS — R79.89 LOW TESTOSTERONE: Primary | ICD-10-CM

## 2021-11-19 DIAGNOSIS — N53.14 RETROGRADE EJACULATION: ICD-10-CM

## 2021-11-19 DIAGNOSIS — N45.1 LEFT EPIDIDYMITIS: ICD-10-CM

## 2021-11-19 DIAGNOSIS — R39.89 BLADDER PAIN: ICD-10-CM

## 2021-11-19 PROCEDURE — 87086 URINE CULTURE/COLONY COUNT: CPT | Performed by: NURSE PRACTITIONER

## 2021-11-19 PROCEDURE — 99215 OFFICE O/P EST HI 40 MIN: CPT | Mod: S$GLB,,, | Performed by: NURSE PRACTITIONER

## 2021-11-19 PROCEDURE — 99215 PR OFFICE/OUTPT VISIT, EST, LEVL V, 40-54 MIN: ICD-10-PCS | Mod: S$GLB,,, | Performed by: NURSE PRACTITIONER

## 2021-11-19 PROCEDURE — 99999 PR PBB SHADOW E&M-EST. PATIENT-LVL III: CPT | Mod: PBBFAC,,, | Performed by: NURSE PRACTITIONER

## 2021-11-19 PROCEDURE — 99999 PR PBB SHADOW E&M-EST. PATIENT-LVL III: ICD-10-PCS | Mod: PBBFAC,,, | Performed by: NURSE PRACTITIONER

## 2021-11-19 RX ORDER — METHENAMINE, SODIUM PHOSPHATE, MONOBASIC, MONOHYDRATE, PHENYL SALICYLATE, METHYLENE BLUE, AND HYOSCYAMINE SULFATE 118; 40.8; 36; 10; .12 MG/1; MG/1; MG/1; MG/1; MG/1
1 CAPSULE ORAL 4 TIMES DAILY PRN
Qty: 40 CAPSULE | Refills: 2 | Status: SHIPPED | OUTPATIENT
Start: 2021-11-19 | End: 2022-05-18 | Stop reason: SDUPTHER

## 2021-11-19 RX ORDER — DOXYCYCLINE 100 MG/1
100 CAPSULE ORAL 2 TIMES DAILY
Qty: 20 CAPSULE | Refills: 0 | Status: SHIPPED | OUTPATIENT
Start: 2021-11-19 | End: 2023-09-07

## 2021-11-21 LAB — BACTERIA UR CULT: NO GROWTH

## 2021-12-03 ENCOUNTER — PATIENT MESSAGE (OUTPATIENT)
Dept: UROLOGY | Facility: CLINIC | Age: 51
End: 2021-12-03
Payer: COMMERCIAL

## 2021-12-10 ENCOUNTER — OFFICE VISIT (OUTPATIENT)
Dept: UROLOGY | Facility: CLINIC | Age: 51
End: 2021-12-10
Payer: COMMERCIAL

## 2021-12-10 VITALS
DIASTOLIC BLOOD PRESSURE: 84 MMHG | BODY MASS INDEX: 29.59 KG/M2 | HEIGHT: 70 IN | HEART RATE: 73 BPM | WEIGHT: 206.69 LBS | SYSTOLIC BLOOD PRESSURE: 124 MMHG

## 2021-12-10 DIAGNOSIS — R10.2 PELVIC PAIN IN MALE: ICD-10-CM

## 2021-12-10 DIAGNOSIS — R39.11 URINARY HESITANCY: ICD-10-CM

## 2021-12-10 DIAGNOSIS — R79.89 LOW TESTOSTERONE: Primary | ICD-10-CM

## 2021-12-10 PROCEDURE — 99212 PR OFFICE/OUTPT VISIT, EST, LEVL II, 10-19 MIN: ICD-10-PCS | Mod: S$GLB,,, | Performed by: NURSE PRACTITIONER

## 2021-12-10 PROCEDURE — 99212 OFFICE O/P EST SF 10 MIN: CPT | Mod: S$GLB,,, | Performed by: NURSE PRACTITIONER

## 2021-12-10 PROCEDURE — 99999 PR PBB SHADOW E&M-EST. PATIENT-LVL IV: CPT | Mod: PBBFAC,,, | Performed by: NURSE PRACTITIONER

## 2021-12-10 PROCEDURE — 99999 PR PBB SHADOW E&M-EST. PATIENT-LVL IV: ICD-10-PCS | Mod: PBBFAC,,, | Performed by: NURSE PRACTITIONER

## 2021-12-10 RX ORDER — TESTOSTERONE CYPIONATE 1000 MG/10ML
100 INJECTION, SOLUTION INTRAMUSCULAR
Qty: 10 ML | Refills: 0 | Status: SHIPPED | OUTPATIENT
Start: 2021-12-10 | End: 2023-09-07

## 2021-12-10 RX ORDER — SYRINGE W-NEEDLE,DISPOSAB,3 ML 25GX5/8"
1 SYRINGE, EMPTY DISPOSABLE MISCELLANEOUS WEEKLY
Qty: 1 EACH | Refills: 1 | Status: SHIPPED | OUTPATIENT
Start: 2021-12-10 | End: 2023-09-07

## 2021-12-29 ENCOUNTER — PATIENT MESSAGE (OUTPATIENT)
Dept: PRIMARY CARE CLINIC | Facility: CLINIC | Age: 51
End: 2021-12-29
Payer: COMMERCIAL

## 2021-12-29 NOTE — LETTER
January 3, 2022      [facility] [address], [facility] [address]  [facility] [address]  Phone: [phone]  Fax: [phone]       Patient: Lola Stevens   YOB: 1970    To Whom It May Concern:    Please excuse the patient from work on 12/28/2021-12/29/2021. The patient may return to work on 1/3/2022 with no restrictions. If you have any questions or concerns, or if I can be of further assistance, please do not hesitate to contact me.    Sincerely,    Jory Honeycutt LPN

## 2022-01-16 NOTE — TELEPHONE ENCOUNTER
No new care gaps identified.  Powered by [employer]. Reference number: [id number].   1/16/2022 7:18:39 AM CST

## 2022-01-18 NOTE — TELEPHONE ENCOUNTER
No new care gaps identified.  Powered by [employer]. Reference number: [id number].   1/18/2022 12:15:58 AM CST

## 2022-01-21 RX ORDER — PANTOPRAZOLE SODIUM 40 MG/1
TABLET, DELAYED RELEASE ORAL
Qty: 90 TABLET | Refills: 2 | Status: SHIPPED | OUTPATIENT
Start: 2022-01-21 | End: 2022-12-22

## 2022-01-21 RX ORDER — DULOXETIN HYDROCHLORIDE 20 MG/1
CAPSULE, DELAYED RELEASE ORAL
Qty: 90 CAPSULE | Refills: 1 | Status: SHIPPED | OUTPATIENT
Start: 2022-01-21 | End: 2022-08-11

## 2022-01-21 NOTE — TELEPHONE ENCOUNTER
Refill Authorization Note   Lola Stevens  is requesting a refill authorization.  Brief Assessment and Rationale for Refill:  Approve     Medication Therapy Plan:       Medication Reconciliation Completed: No   Comments:   --->Care Gap information included below if applicable.   Orders Placed This Encounter    pantoprazole (PROTONIX) 40 MG tablet      Requested Prescriptions   Signed Prescriptions Disp Refills    pantoprazole (PROTONIX) 40 MG tablet 90 tablet 2     Sig: TAKE 1 TABLET BY MOUTH EVERY DAY       Gastroenterology: Proton Pump Inhibitors 2 Passed - 1/16/2022  7:18 AM        Passed - Patient is at least 18 years old        Passed - Osteoporosis is not on problem list        Passed - An appropriate indication is on the problem list        Passed - Valid encounter within last 15 months     Recent Visits  Date Type Provider Dept   07/26/21 Office Visit Estiven Worthington MD [facility]   05/25/21 Office Visit Estiven Worthington MD [facility]   03/03/21 Office Visit Estiven Worthington MD [facility]   06/10/20 Office Visit Xiomara Mello MD [facility]   Showing recent visits within past 720 days and meeting all other requirements  Future Appointments  No visits were found meeting these conditions.  Showing future appointments within next 150 days and meeting all other requirements                    Appointments  past 12m or future 3m with PCP    Date Provider   Last Visit   7/26/2021 Estiven Worthington MD   Next Visit    Estiven Worthington MD   ED visits in past 90 days: 0     Note composed:11:04 AM 01/21/2022

## 2022-01-21 NOTE — TELEPHONE ENCOUNTER
Refill Authorization Note   Lola Stevens  is requesting a refill authorization.  Brief Assessment and Rationale for Refill:  Approve     Medication Therapy Plan:       Medication Reconciliation Completed: No   Comments:   --->Care Gap information included below if applicable.   Orders Placed This Encounter    DULoxetine (CYMBALTA) 20 MG capsule      Requested Prescriptions   Signed Prescriptions Disp Refills    DULoxetine (CYMBALTA) 20 MG capsule 90 capsule 1     Sig: TAKE 1 CAPSULE BY MOUTH EVERY DAY       Psychiatry: Antidepressants - SNRI Passed - 1/18/2022 12:15 AM        Passed - Patient is at least 18 years old        Passed - Last BP in normal range within 360 days     BP Readings from Last 1 Encounters:   12/10/21 124/84               Passed - Valid encounter within last 15 months     Recent Visits  Date Type Provider Dept   07/26/21 Office Visit Estiven Worthington MD [facility]   05/25/21 Office Visit Estiven Worthington MD [facility]   03/03/21 Office Visit Estiven Worthington MD [facility]   06/10/20 Office Visit Xiomara Mello MD [facility] Internal Medicine   Showing recent visits within past 720 days and meeting all other requirements  Future Appointments  No visits were found meeting these conditions.  Showing future appointments within next 150 days and meeting all other requirements                Passed - Cr is 1.39 or below and within 360 days     Lab Results   Component Value Date    CREATININE 0.9 09/24/2021    CREATININE 1.0 08/27/2021    CREATININE 1.0 03/03/2021    POCCRE 1.1 08/27/2021              Passed - eGFR is 30 or above and within 360 days     Lab Results   Component Value Date    EGFRNONAA >60 09/24/2021    EGFRNONAA >60.0 08/27/2021    EGFRNONAA >60.0 03/03/2021                    Appointments  past 12m or future 3m with PCP    Date Provider   Last Visit   7/26/2021 Estiven Worthington MD   Next Visit   Visit date not found Estiven Worthington MD    ED visits in past 90 days: 0     Note composed:4:58 PM 01/21/2022

## 2022-02-21 ENCOUNTER — PATIENT MESSAGE (OUTPATIENT)
Dept: ORTHOPEDICS | Facility: CLINIC | Age: 52
End: 2022-02-21
Payer: COMMERCIAL

## 2022-02-21 RX ORDER — PREGABALIN 75 MG/1
75 CAPSULE ORAL 2 TIMES DAILY
Qty: 60 CAPSULE | Refills: 6 | Status: SHIPPED | OUTPATIENT
Start: 2022-02-21 | End: 2023-09-07

## 2022-05-16 ENCOUNTER — PATIENT OUTREACH (OUTPATIENT)
Dept: ADMINISTRATIVE | Facility: OTHER | Age: 52
End: 2022-05-16
Payer: COMMERCIAL

## 2022-05-16 NOTE — PROGRESS NOTES
Health Maintenance Due   Topic Date Due    COVID-19 Vaccine (1) Never done    Shingles Vaccine (1 of 2) Never done     Updates were requested from care everywhere.  Chart was reviewed for overdue Proactive [facility]ters (MICK[facility]) topics (CRS, Breast Cancer Screening, Eye exam)  Health Maintenance has been updated.  LINKS immunization registry triggered.  Immunizations were reconciled.

## 2022-05-16 NOTE — PROGRESS NOTES
"Subjective:      Lola Stevens Jr. is a 51 y.o. male who returns today regarding his chronic prostatitis.    He presents today to discuss worsening bilateral leg pain, suprapubic pain and perineal pain.  He has a history of chronic prostatitis and was previously referred to pelvic floor physical therapy however patient has declined.  He was previously on TRT but stopped twice on his own due to concerns over prostate cancer and worsening prostatitis.  His previous MALIA is have been unremarkable and his PSA last year was 0.68.  He denies dysuria, hematuria, flank pain but does report that he has multiple back and spine issues.  States that azo is helping with symptom relief.  Denies fever, chills, nausea, vomiting.    The following portions of the patient's history were reviewed and updated as appropriate: allergies, current medications, past family history, past medical history, past social history, past surgical history and problem list.    Review of Systems  A comprehensive multipoint review of systems was negative except as otherwise stated in the HPI.     Objective:   Vitals: /77 (BP Location: Left arm, Patient Position: Sitting, BP Method: Small (Automatic))   Pulse 63   Ht 5' 10" (1.778 m)   Wt 94.4 kg (208 lb 3.6 oz)   BMI 29.88 kg/m²     Physical Exam   General: alert and oriented, no acute distress  Respiratory: Symmetric expansion, non-labored breathing  Cardiovascular: regular rate and rhythm, no peripheral edema  Abdomen: soft, non distended  MALIA deferred due to possible UTI  Skin: normal coloration and turgor, no rashes, no suspicious skin lesions noted  Neuro: no gross deficits  Psych: normal judgment and insight, normal mood/affect and non-anxious    Lab Review   Urinalysis demonstrates no specimen   Lab Results   Component Value Date    WBC 4.87 09/24/2021    HGB 16.8 09/24/2021    HCT 46.8 09/24/2021    MCV 84 09/24/2021     09/24/2021     Lab Results   Component Value Date    " CREATININE 0.9 09/24/2021    BUN 15 09/24/2021     Lab Results   Component Value Date    PSA 0.68 06/19/2020    PSADIAG 0.62 05/01/2021              Bladder Scan Random: 123 cc        Assessment and Plan:   1. Pelvic pain in male  2. Bladder pain  --low suspicion for acute prostatitis/UTI today; will send urine culture to rule out and start Bactrim  --we discussed signs and symptoms of common bladder issues such as overactive bladder and interstitial cystitis; we also discussed management of chronic prostatitis including pelvic floor physical therapy and restarting Flomax  --will trial Uribel for bladder pain and obtain RBUS for further evaluation  --follow-up in 2-3 weeks for medication assessment, ultrasound review, MALIA and referral to pelvic floor physical therapy           This note is dictated on M*Modal word recognition program.  There are word recognition mistakes that are occasionally missed on review.

## 2022-05-18 ENCOUNTER — OFFICE VISIT (OUTPATIENT)
Dept: UROLOGY | Facility: CLINIC | Age: 52
End: 2022-05-18
Payer: COMMERCIAL

## 2022-05-18 ENCOUNTER — PATIENT MESSAGE (OUTPATIENT)
Dept: UROLOGY | Facility: CLINIC | Age: 52
End: 2022-05-18

## 2022-05-18 ENCOUNTER — TELEPHONE (OUTPATIENT)
Dept: UROLOGY | Facility: CLINIC | Age: 52
End: 2022-05-18
Payer: COMMERCIAL

## 2022-05-18 VITALS
BODY MASS INDEX: 29.81 KG/M2 | SYSTOLIC BLOOD PRESSURE: 113 MMHG | HEIGHT: 70 IN | WEIGHT: 208.25 LBS | HEART RATE: 63 BPM | DIASTOLIC BLOOD PRESSURE: 77 MMHG

## 2022-05-18 DIAGNOSIS — R10.2 PELVIC PAIN IN MALE: Primary | ICD-10-CM

## 2022-05-18 DIAGNOSIS — R30.0 DYSURIA: ICD-10-CM

## 2022-05-18 DIAGNOSIS — R39.89 BLADDER PAIN: ICD-10-CM

## 2022-05-18 PROCEDURE — 99999 PR PBB SHADOW E&M-EST. PATIENT-LVL V: CPT | Mod: PBBFAC,,, | Performed by: NURSE PRACTITIONER

## 2022-05-18 PROCEDURE — 99214 OFFICE O/P EST MOD 30 MIN: CPT | Mod: S$GLB,,, | Performed by: NURSE PRACTITIONER

## 2022-05-18 PROCEDURE — 99214 PR OFFICE/OUTPT VISIT, EST, LEVL IV, 30-39 MIN: ICD-10-PCS | Mod: S$GLB,,, | Performed by: NURSE PRACTITIONER

## 2022-05-18 PROCEDURE — 87086 URINE CULTURE/COLONY COUNT: CPT | Performed by: NURSE PRACTITIONER

## 2022-05-18 PROCEDURE — 99999 PR PBB SHADOW E&M-EST. PATIENT-LVL V: ICD-10-PCS | Mod: PBBFAC,,, | Performed by: NURSE PRACTITIONER

## 2022-05-18 RX ORDER — TAMSULOSIN HYDROCHLORIDE 0.4 MG/1
0.4 CAPSULE ORAL DAILY
Qty: 30 CAPSULE | Refills: 11 | Status: SHIPPED | OUTPATIENT
Start: 2022-05-18 | End: 2023-09-07

## 2022-05-18 RX ORDER — CIPROFLOXACIN 500 MG/1
500 TABLET ORAL EVERY 12 HOURS
Qty: 14 TABLET | Refills: 0 | Status: SHIPPED | OUTPATIENT
Start: 2022-05-18 | End: 2022-05-25

## 2022-05-18 RX ORDER — METHENAMINE, SODIUM PHOSPHATE, MONOBASIC, MONOHYDRATE, PHENYL SALICYLATE, METHYLENE BLUE, AND HYOSCYAMINE SULFATE 118; 40.8; 36; 10; .12 MG/1; MG/1; MG/1; MG/1; MG/1
1 CAPSULE ORAL 4 TIMES DAILY PRN
Qty: 40 CAPSULE | Refills: 2 | Status: SHIPPED | OUTPATIENT
Start: 2022-05-18 | End: 2023-09-07

## 2022-05-18 NOTE — LETTER
May 18, 2022      [facility] [address], [facility] [address]  [facility] [address]  Phone: [phone]  Fax: [phone]       Patient: Lola Stevens   YOB: 1970  Date of Visit: 05/18/2022    To Whom It May Concern:    Wheat Wheat  was at [facility] on 05/18/2022. The patient may return to work on 5/18/22 with no restrictions. If you have any questions or concerns, or if I can be of further assistance, please do not hesitate to contact me.    Sincerely,    Cat Baig NP

## 2022-05-18 NOTE — TELEPHONE ENCOUNTER
Spoke with pharmacy. Stated that provider said it is okay to change prescription to Bactrim since it is not recommended  that the pt takes Cipro with Cymbalta

## 2022-05-18 NOTE — LETTER
May 18, 2022      [facility] - Urology [facility] [address]  [address], [facility] [address]  [facility] [address]  Phone: [phone]  Fax: [phone]       Patient: Lola Stevens   YOB: 1970  Date of Visit: 5/23/22    To Whom It May Concern:    Wheat Wheat  was at [facility] on 5/23/22.  If you have any questions or concerns, or if I can be of further assistance, please do not hesitate to contact me.    Sincerely,    Cat Baig NP

## 2022-05-18 NOTE — TELEPHONE ENCOUNTER
----- Message from Nimco Underwood sent at 5/18/2022  9:12 AM CDT -----  Contact: Mika  [facility] calling in regards to RX: ciprofloxacin HCl (CIPRO) 500 MG tablet. Requesting call back.    [facility] @[phone]

## 2022-05-19 LAB — BACTERIA UR CULT: NO GROWTH

## 2022-05-23 ENCOUNTER — HOSPITAL ENCOUNTER (OUTPATIENT)
Dept: RADIOLOGY | Facility: OTHER | Age: 52
Discharge: HOME | End: 2022-05-23
Attending: NURSE PRACTITIONER
Payer: COMMERCIAL

## 2022-05-23 DIAGNOSIS — R39.89 BLADDER PAIN: ICD-10-CM

## 2022-05-23 DIAGNOSIS — R10.2 PELVIC PAIN IN MALE: ICD-10-CM

## 2022-05-23 PROCEDURE — 76770 US RETROPERITONEAL COMPLETE: ICD-10-PCS | Mod: 26,,, | Performed by: RADIOLOGY

## 2022-05-23 PROCEDURE — 76770 US EXAM ABDO BACK WALL COMP: CPT | Mod: TC

## 2022-05-23 PROCEDURE — 76770 US EXAM ABDO BACK WALL COMP: CPT | Mod: 26,,, | Performed by: RADIOLOGY

## 2022-06-17 ENCOUNTER — OFFICE VISIT (OUTPATIENT)
Dept: UROLOGY | Facility: CLINIC | Age: 52
End: 2022-06-17
Payer: COMMERCIAL

## 2022-06-17 VITALS
HEIGHT: 70 IN | WEIGHT: 211.44 LBS | RESPIRATION RATE: 16 BRPM | HEART RATE: 77 BPM | BODY MASS INDEX: 30.27 KG/M2 | SYSTOLIC BLOOD PRESSURE: 142 MMHG | DIASTOLIC BLOOD PRESSURE: 91 MMHG

## 2022-06-17 DIAGNOSIS — R39.89 BLADDER PAIN: ICD-10-CM

## 2022-06-17 DIAGNOSIS — R10.2 PELVIC PAIN IN MALE: Primary | ICD-10-CM

## 2022-06-17 PROCEDURE — 99999 PR PBB SHADOW E&M-EST. PATIENT-LVL IV: CPT | Mod: PBBFAC,,, | Performed by: NURSE PRACTITIONER

## 2022-06-17 PROCEDURE — 99214 PR OFFICE/OUTPT VISIT, EST, LEVL IV, 30-39 MIN: ICD-10-PCS | Mod: S$GLB,,, | Performed by: NURSE PRACTITIONER

## 2022-06-17 PROCEDURE — 99214 OFFICE O/P EST MOD 30 MIN: CPT | Mod: S$GLB,,, | Performed by: NURSE PRACTITIONER

## 2022-06-17 PROCEDURE — 99999 PR PBB SHADOW E&M-EST. PATIENT-LVL IV: ICD-10-PCS | Mod: PBBFAC,,, | Performed by: NURSE PRACTITIONER

## 2022-06-17 NOTE — PROGRESS NOTES
"Subjective:      Lola Stevens Jr. is a 51 y.o. male who returns today regarding his chronic pelvic pain.    He presents today to discuss recent RBUS which failed to reveal stones, hydro, masses.   His UC was negative. Reports that Uribel is helping with intermittent bladder pain.   He remains on Flomax daily; denies JOSE, hesitancy.   He recently established care with back/spine specialist and has imaging scheduled next Friday.       HPI: He presents today to discuss worsening bilateral leg pain, suprapubic pain and perineal pain.  He has a history of chronic prostatitis and was previously referred to pelvic floor physical therapy however patient has declined.  He was previously on TRT but stopped twice on his own due to concerns over prostate cancer and worsening prostatitis.  His previous MALIA is have been unremarkable and his PSA last year was 0.68.  He denies dysuria, hematuria, flank pain but does report that he has multiple back and spine issues.  States that azo is helping with symptom relief.  Denies fever, chills, nausea, vomiting.    The following portions of the patient's history were reviewed and updated as appropriate: allergies, current medications, past family history, past medical history, past social history, past surgical history and problem list.    Review of Systems  A comprehensive multipoint review of systems was negative except as otherwise stated in the HPI.     Objective:   Vitals: BP (!) 142/91 (BP Location: Left arm, Patient Position: Sitting, BP Method: Large (Automatic))   Pulse 77   Resp 16   Ht 5' 10" (1.778 m)   Wt 95.9 kg (211 lb 6.7 oz)   BMI 30.34 kg/m²     Physical Exam   General: alert and oriented, no acute distress  Respiratory: Symmetric expansion, non-labored breathing  Cardiovascular: regular rate and rhythm, no peripheral edema  Abdomen: soft, non distended  Skin: normal coloration and turgor, no rashes, no suspicious skin lesions noted  Neuro: no gross " deficits  Psych: normal judgment and insight, normal mood/affect and non-anxious    Lab Review   Urinalysis demonstrates negative for all components  Lab Results   Component Value Date    WBC 4.87 09/24/2021    HGB 16.8 09/24/2021    HCT 46.8 09/24/2021    MCV 84 09/24/2021     09/24/2021     Lab Results   Component Value Date    CREATININE 0.9 09/24/2021    BUN 15 09/24/2021     Lab Results   Component Value Date    PSA 0.68 06/19/2020    PSADIAG 0.62 05/01/2021       Imaging   US RETROPERITONEAL COMPLETE     CLINICAL HISTORY:  Pelvic and perineal pain     TECHNIQUE:  Ultrasound of the kidneys and urinary bladder was performed including color flow and Doppler evaluation of the kidneys.     COMPARISON:  Renal ultrasound dated 08/13/2021.     CT abdomen pelvis dated 10/01/2021.     FINDINGS:  Right kidney: The right kidney measures 10.6 cm. No cortical thinning. No loss of corticomedullary distinction. Resistive index measures 0.67.  No mass. No renal stone. No hydronephrosis.     Left kidney: The left kidney measures 11.8 cm. No cortical thinning. No loss of corticomedullary distinction. Resistive index measures 0.65.  No mass. No renal stone. No hydronephrosis.     The urinary bladder is unremarkable with bilateral ureteral jets present.     Impression:     Unremarkable examination.        Electronically signed by: Chase Seo MD  Date:                                            05/23/2022  Time:                                           12:35         Bladder Scan PVR: 0 cc        Assessment and Plan:   1. Pelvic pain in male  --Proceed with PFPT; pt will notify us when he is ready to proceed- has spine imaging scheduled this Friday   --Would like referral for GI; will notify us of what location he prefers.     2. Bladder pain  --Continue Uribel          This note is dictated on M*Modal word recognition program.  There are word recognition mistakes that are occasionally missed on review.

## 2022-08-11 RX ORDER — DULOXETIN HYDROCHLORIDE 20 MG/1
CAPSULE, DELAYED RELEASE ORAL
Qty: 90 CAPSULE | Refills: 0 | Status: SHIPPED | OUTPATIENT
Start: 2022-08-11 | End: 2023-09-07

## 2022-08-11 NOTE — TELEPHONE ENCOUNTER
Called for pt, no answer. Unable to leave message on voicemail to return call, voicemail not set up.

## 2022-08-11 NOTE — TELEPHONE ENCOUNTER
Care Due:                  Date            Visit Type   Department     Provider  --------------------------------------------------------------------------------                                MYCHART                              [place]  [facility]  Last Visit: 07-      FICE VISIT   PRIMARY CARE   Estiven Worthington  Next Visit: None Scheduled  None         None Found                                                            Last  Test          Frequency    Reason                     Performed    Due Date  --------------------------------------------------------------------------------    Office Visit  12 months..  losartan-hydrochlorothiaz  07- 07-                             nancy......................    CMP.........  12 months..  losartan-hydrochlorothiaz  09- 09-                             nancy......................    [employer] Embedded Care Gaps. Reference number: [id number]. 8/11/2022   12:18:13 AM CDT

## 2022-08-11 NOTE — TELEPHONE ENCOUNTER
Refill Routing Note   Medication(s) are not appropriate for processing by [facility] for the following reason(s):      - Required vitals are abnormal    [facility] action(s):  Defer Medication-related problems identified:   Requires appointment  Requires labs        Medication reconciliation completed: No     Appointments  past 12m or future 3m with PCP    Date Provider   Last Visit   7/26/2021 Estiven Worthington MD   Next Visit   Visit date not found Estiven Worthington MD   ED visits in past 90 days: 0        Note composed:11:02 AM 08/11/2022

## 2022-08-12 DIAGNOSIS — I10 ESSENTIAL HYPERTENSION: ICD-10-CM

## 2022-08-12 RX ORDER — LOSARTAN POTASSIUM AND HYDROCHLOROTHIAZIDE 12.5; 5 MG/1; MG/1
TABLET ORAL
Qty: 90 TABLET | Refills: 0 | Status: SHIPPED | OUTPATIENT
Start: 2022-08-12 | End: 2022-11-16

## 2022-08-12 NOTE — TELEPHONE ENCOUNTER
Refill Routing Note   Medication(s) are not appropriate for processing by [facility] for the following reason(s):      - Required vitals are abnormal    [facility] action(s):  Defer          Medication reconciliation completed: No     Appointments  past 12m or future 3m with PCP    Date Provider   Last Visit   7/26/2021 Estiven Worthington MD   Next Visit   Visit date not found Estiven Worthington MD   ED visits in past 90 days: 0        Note composed:6:52 AM 08/12/2022

## 2022-08-12 NOTE — TELEPHONE ENCOUNTER
No new care gaps identified.  [facility] Embedded Care Gaps. Reference number: [id number]. 8/12/2022   2:25:49 AM CDT

## 2022-08-25 NOTE — TELEPHONE ENCOUNTER
[place].       RN contacted pt through the [facility]ural System Tracker for Day 13.  Pt reports that he is asymptomatic and doing well.    Additional Information   Negative: [1] Caller is not with the adult (patient) AND [2] reporting urgent symptoms   Negative: Lab result questions   Negative: Medication questions   Negative: Caller can't be reached by phone   Negative: Caller has already spoken to PCP or another triager   Negative: RN needs further essential information from caller in order to complete triage   Negative: Requesting regular office appointment   Negative: [1] Caller requesting NON-URGENT health information AND [2] PCP's office is the best resource   Negative: Health Information question, no triage required and triager able to answer question   Negative: General information question, no triage required and triager able to answer question   Negative: Question about upcoming scheduled test, no triage required and triager able to answer question   Negative: [1] Caller is not with the adult (patient) AND [2] probable NON-URGENT symptoms   Negative: [1] Follow-up call to recent contact AND [2] information only call, no triage required    Protocols used: INFORMATION ONLY CALL-A-

## 2022-11-16 DIAGNOSIS — I10 ESSENTIAL HYPERTENSION: ICD-10-CM

## 2022-11-16 RX ORDER — LOSARTAN POTASSIUM AND HYDROCHLOROTHIAZIDE 12.5; 5 MG/1; MG/1
TABLET ORAL
Qty: 90 TABLET | Refills: 0 | Status: SHIPPED | OUTPATIENT
Start: 2022-11-16 | End: 2023-07-10 | Stop reason: SDUPTHER

## 2022-11-16 NOTE — TELEPHONE ENCOUNTER
Refill Routing Note   Medication(s) are not appropriate for processing by [facility] for the following reason(s):      - Required laboratory values are outdated  - Required vitals are abnormal    [facility] action(s):  Defer Medication-related problems identified:   Requires appointment  Requires labs        Medication reconciliation completed: No     Appointments  past 12m or future 3m with PCP    Date Provider   Last Visit   7/26/2021 Estiven Worthington MD   Next Visit   Visit date not found Estiven Worthington MD   ED visits in past 90 days: 0        Note composed:2:44 PM 11/16/2022

## 2022-11-16 NOTE — TELEPHONE ENCOUNTER
Care Due:                  Date            Visit Type   Department     Provider  --------------------------------------------------------------------------------                                MYCHART                              FOLLOWUP/OF  [facility]  Last Visit: 07-      FICE VISIT   PRIMARY CARE   Estiven Worthington  Next Visit: None Scheduled  None         None Found                                                            Last  Test          Frequency    Reason                     Performed    Due Date  --------------------------------------------------------------------------------    Office Visit  12 months..  losartan-hydrochlorothiaz  07- 07-                             nancy......................    CMP.........  12 months..  losartan-hydrochlorothiaz  09- 09-                             nancy......................    [employer] Embedded Care Gaps. Reference number: [id number]. 11/16/2022   12:17:43 AM CST

## 2022-12-22 RX ORDER — PANTOPRAZOLE SODIUM 40 MG/1
TABLET, DELAYED RELEASE ORAL
Qty: 90 TABLET | Refills: 0 | Status: SHIPPED | OUTPATIENT
Start: 2022-12-22 | End: 2023-09-07

## 2022-12-22 NOTE — TELEPHONE ENCOUNTER
I called and spoke with pts' wife(on list) and adv her rx approved and appt needed. She voiced understanding and will have the pt call to schedule.

## 2022-12-22 NOTE — TELEPHONE ENCOUNTER
Refill Routing Note   Medication(s) are not appropriate for processing by [facility] for the following reason(s):      - Patient has not been seen in over 15 months by PCP    [facility] action(s):  Defer          Medication reconciliation completed: No     Appointments  past 12m or future 3m with PCP    Date Provider   Last Visit   7/26/2021 Estiven Worthington MD   Next Visit   Visit date not found Estiven Worthington MD   ED visits in past 90 days: 0        Note composed:8:23 AM 12/22/2022

## 2022-12-22 NOTE — TELEPHONE ENCOUNTER
No new care gaps identified.  [facility] Embedded Care Gaps. Reference number: [id number]. 12/22/2022   12:17:40 AM CST

## 2023-01-30 NOTE — TELEPHONE ENCOUNTER
See note below.   What Type Of Note Output Would You Prefer (Optional)?: Bullet Format Hpi Title: Evaluation of Skin Lesions How Severe Are Your Spot(S)?: mild Have Your Spot(S) Been Treated In The Past?: has not been treated

## 2023-07-10 ENCOUNTER — PATIENT MESSAGE (OUTPATIENT)
Dept: PRIMARY CARE CLINIC | Facility: CLINIC | Age: 53
End: 2023-07-10
Payer: COMMERCIAL

## 2023-07-10 DIAGNOSIS — I10 ESSENTIAL HYPERTENSION: ICD-10-CM

## 2023-07-10 RX ORDER — LOSARTAN POTASSIUM AND HYDROCHLOROTHIAZIDE 12.5; 5 MG/1; MG/1
1 TABLET ORAL DAILY
Qty: 30 TABLET | Refills: 0 | Status: SHIPPED | OUTPATIENT
Start: 2023-07-10 | End: 2023-09-07 | Stop reason: SDUPTHER

## 2023-07-10 NOTE — TELEPHONE ENCOUNTER
Care Due:                  Date            Visit Type   Department     Provider  --------------------------------------------------------------------------------                                MYCHART                              FOLLOWUP/OF  [facility]  Last Visit: 07-      [facility]ICE VISIT   [facility]                              ANNUAL                              CHECKUP[facility]PHY  [facility]  Next Visit: 08-      S            PRIMARY CARE   [facility]                                                            Last  Test          Frequency    Reason                     Performed    Due Date  --------------------------------------------------------------------------------    CMP.........  12 months..  losartan-hydrochlorothiaz  09- 09-                             nancy......................    [employer] Embedded Care Due Messages. Reference number: [id number].   7/10/2023 12:13:52 PM CDT

## 2023-09-07 ENCOUNTER — OFFICE VISIT (OUTPATIENT)
Dept: PRIMARY CARE CLINIC | Facility: CLINIC | Age: 53
End: 2023-09-07
Payer: COMMERCIAL

## 2023-09-07 VITALS
OXYGEN SATURATION: 97 % | TEMPERATURE: 98 F | SYSTOLIC BLOOD PRESSURE: 152 MMHG | WEIGHT: 220.88 LBS | RESPIRATION RATE: 18 BRPM | HEART RATE: 88 BPM | DIASTOLIC BLOOD PRESSURE: 94 MMHG | BODY MASS INDEX: 31.62 KG/M2 | HEIGHT: 70 IN

## 2023-09-07 DIAGNOSIS — Z13.1 DIABETES MELLITUS SCREENING: ICD-10-CM

## 2023-09-07 DIAGNOSIS — Z51.81 MEDICATION MONITORING ENCOUNTER: ICD-10-CM

## 2023-09-07 DIAGNOSIS — Z13.6 ENCOUNTER FOR SCREENING FOR CARDIOVASCULAR DISORDERS: ICD-10-CM

## 2023-09-07 DIAGNOSIS — I10 BENIGN ESSENTIAL HTN: ICD-10-CM

## 2023-09-07 DIAGNOSIS — Z00.00 ANNUAL PHYSICAL EXAM: Primary | ICD-10-CM

## 2023-09-07 PROCEDURE — 99396 PR PREVENTIVE VISIT,EST,40-64: ICD-10-PCS | Mod: S$GLB,,, | Performed by: STUDENT IN AN ORGANIZED HEALTH CARE EDUCATION/TRAINING PROGRAM

## 2023-09-07 PROCEDURE — 99999 PR PBB SHADOW E&M-EST. PATIENT-LVL IV: ICD-10-PCS | Mod: PBBFAC,,, | Performed by: STUDENT IN AN ORGANIZED HEALTH CARE EDUCATION/TRAINING PROGRAM

## 2023-09-07 PROCEDURE — 99999 PR PBB SHADOW E&M-EST. PATIENT-LVL IV: CPT | Mod: PBBFAC,,, | Performed by: STUDENT IN AN ORGANIZED HEALTH CARE EDUCATION/TRAINING PROGRAM

## 2023-09-07 PROCEDURE — 99396 PREV VISIT EST AGE 40-64: CPT | Mod: S$GLB,,, | Performed by: STUDENT IN AN ORGANIZED HEALTH CARE EDUCATION/TRAINING PROGRAM

## 2023-09-07 RX ORDER — LOSARTAN POTASSIUM AND HYDROCHLOROTHIAZIDE 12.5; 5 MG/1; MG/1
1 TABLET ORAL DAILY
Qty: 90 TABLET | Refills: 0 | Status: SHIPPED | OUTPATIENT
Start: 2023-09-07

## 2023-09-07 NOTE — PROGRESS NOTES
"Subjective:       Patient ID: Lola Stevens Jr. is a 52 y.o. male.    Chief Complaint: Annual Exam    HPI:  52 y.o. male presents to [facility] for annual exam    Acute concerns?: Would like annual visit and BP med refills at this time    HTN:  Home BP log?: Does check at home when having problems  Medications: losartan-HCTZ 50-12.5 mg  Compliance?: Has been out of medication and needing clinic follow-up    Diet?: Eats healthy overall, home made meals, does occasionally add salt  Exercise?: Walks some    Review of Systems   Constitutional:  Negative for activity change and unexpected weight change.   HENT:  Negative for hearing loss, rhinorrhea and trouble swallowing.    Eyes:  Negative for discharge and visual disturbance.   Respiratory:  Negative for chest tightness and wheezing.    Cardiovascular:  Negative for chest pain and palpitations.   Gastrointestinal:  Negative for blood in stool, constipation, diarrhea and vomiting.   Endocrine: Negative for polydipsia and polyuria.   Genitourinary:  Negative for difficulty urinating, hematuria and urgency.   Musculoskeletal:  Positive for neck pain (Reports chronis, not here for that today, here for annual). Negative for arthralgias and joint swelling.   Neurological:  Negative for weakness and headaches.   Psychiatric/Behavioral:  Negative for confusion and dysphoric mood.        Objective:      Vitals:    09/07/23 1526   BP: (!) 150/92   BP Location: Right arm   Patient Position: Sitting   BP Method: Medium (Manual)   Pulse: 88   Resp: 18   Temp: 97.6 °F (36.4 °C)   TempSrc: Temporal   SpO2: 97%   Weight: 100.2 kg (220 lb 14.4 oz)   Height: 5' 10" (1.778 m)     Physical Exam  Vitals reviewed.   Constitutional:       General: He is not in acute distress.     Appearance: Normal appearance. He is not ill-appearing.   HENT:      Head: Normocephalic and atraumatic.   Eyes:      General:         Right eye: No discharge.         Left eye: No discharge.      " Conjunctiva/sclera: Conjunctivae normal.   Cardiovascular:      Rate and Rhythm: Normal rate and regular rhythm.      Heart sounds: Normal heart sounds. No murmur heard.  Pulmonary:      Effort: Pulmonary effort is normal.      Breath sounds: Normal breath sounds.   Musculoskeletal:         General: No deformity.      Cervical back: Neck supple. No rigidity.   Lymphadenopathy:      Cervical: No cervical adenopathy.   Skin:     General: Skin is warm and dry.      Coloration: Skin is not jaundiced.   Neurological:      General: No focal deficit present.      Mental Status: He is alert and oriented to person, place, and time.   Psychiatric:         Mood and Affect: Mood normal.         Behavior: Behavior normal.             Lab Results   Component Value Date     09/24/2021    K 4.2 09/24/2021     09/24/2021    CO2 25 09/24/2021    BUN 15 09/24/2021    CREATININE 0.9 09/24/2021    ANIONGAP 9 09/24/2021     Lab Results   Component Value Date    HGBA1C 5.1 03/04/2019     Lab Results   Component Value Date    BNP 39 06/08/2020       Lab Results   Component Value Date    WBC 4.87 09/24/2021    HGB 16.8 09/24/2021    HCT 46.8 09/24/2021     09/24/2021    GRAN 3.0 09/24/2021    GRAN 61.3 09/24/2021     Lab Results   Component Value Date    CHOL 275 (H) 03/12/2021    HDL 44 03/12/2021    LDLCALC 203 (H) 03/12/2021    TRIG 140 03/12/2021          Current Outpatient Medications:     losartan-hydrochlorothiazide 50-12.5 mg (HYZAAR) 50-12.5 mg per tablet, Take 1 tablet by mouth once daily., Disp: 90 tablet, Rfl: 0        Assessment:       1. Annual physical exam    2. Benign essential HTN    3. Medication monitoring encounter    4. Diabetes mellitus screening    5. Encounter for screening for cardiovascular disorders    6. Essential hypertension           Plan:       Annual physical exam  Benign essential HTN  Medication monitoring encounter  -     CBC Auto Differential; Future; Expected date: 09/07/2023  -      COMPREHENSIVE METABOLIC PANEL; Future; Expected date: 09/07/2023  - 2 week nurse BP check  - Will complete annual labs at this time  - Lifestyle tips to help control blood pressure provided today's visit  -     losartan-hydrochlorothiazide 50-12.5 mg (HYZAAR) 50-12.5 mg per tablet; Take 1 tablet by mouth once daily.  Dispense: 90 tablet; Refill: 0  - Return fasting for medication monitoring labs    Diabetes mellitus screening  -     Hemoglobin A1C; Future; Expected date: 09/07/2023    Encounter for screening for cardiovascular disorders  -     Lipid Panel; Future; Expected date: 09/07/2023    RTC PRN

## 2023-09-18 ENCOUNTER — PATIENT MESSAGE (OUTPATIENT)
Dept: PRIMARY CARE CLINIC | Facility: CLINIC | Age: 53
End: 2023-09-18
Payer: COMMERCIAL

## 2023-09-19 DIAGNOSIS — E78.2 MIXED HYPERLIPIDEMIA: Primary | ICD-10-CM

## 2023-09-19 RX ORDER — ATORVASTATIN CALCIUM 10 MG/1
10 TABLET, FILM COATED ORAL DAILY
Qty: 30 TABLET | Refills: 2 | Status: SHIPPED | OUTPATIENT
Start: 2023-09-19 | End: 2023-12-18

## 2023-09-21 ENCOUNTER — CLINICAL SUPPORT (OUTPATIENT)
Dept: PRIMARY CARE CLINIC | Facility: CLINIC | Age: 53
End: 2023-09-21
Payer: COMMERCIAL

## 2023-09-21 VITALS
HEART RATE: 82 BPM | BODY MASS INDEX: 31.5 KG/M2 | HEIGHT: 70 IN | SYSTOLIC BLOOD PRESSURE: 134 MMHG | WEIGHT: 220 LBS | OXYGEN SATURATION: 97 % | RESPIRATION RATE: 18 BRPM | TEMPERATURE: 98 F | DIASTOLIC BLOOD PRESSURE: 82 MMHG

## 2023-09-21 DIAGNOSIS — Z01.30 BLOOD PRESSURE CHECK: Primary | ICD-10-CM

## 2023-09-21 PROCEDURE — 99999 PR PBB SHADOW E&M-EST. PATIENT-LVL III: CPT | Mod: PBBFAC,,,

## 2023-09-21 PROCEDURE — 99999 PR PBB SHADOW E&M-EST. PATIENT-LVL III: ICD-10-PCS | Mod: PBBFAC,,,

## 2023-09-21 NOTE — PROGRESS NOTES
Pt arrived in clinic for blood pressure check. No chief complaints or pain reported. Pt's vitals as follows: /82, P 82, R 18, T 97.5, and O2 saturation 97%. Blood pressure within normal range, no repeat blood pressure check needed. Pt verbalized understanding. MD notified.

## 2023-10-18 ENCOUNTER — PATIENT MESSAGE (OUTPATIENT)
Dept: CARDIOLOGY | Facility: CLINIC | Age: 53
End: 2023-10-18
Payer: COMMERCIAL

## 2023-12-16 DIAGNOSIS — E78.2 MIXED HYPERLIPIDEMIA: ICD-10-CM

## 2023-12-18 RX ORDER — ATORVASTATIN CALCIUM 10 MG/1
10 TABLET, FILM COATED ORAL
Qty: 90 TABLET | Refills: 3 | Status: SHIPPED | OUTPATIENT
Start: 2023-12-18

## 2023-12-19 NOTE — TELEPHONE ENCOUNTER
Refill Routing Note   Medication(s) are not appropriate for processing by [facility] for the following reason(s):        New or recently adjusted medication  No active prescription written by provider  Patient not seen by provider within 15 months    [facility] action(s):  Defer               Appointments  past 12m or future 3m with PCP    Date Provider   Last Visit   7/26/2021 Estiven Worthington MD   Next Visit   Visit date not found Estiven Worthington MD   ED visits in past 90 days: 0        Note composed:7:18 PM 12/18/2023

## 2024-05-19 DIAGNOSIS — I10 BENIGN ESSENTIAL HTN: ICD-10-CM

## 2024-05-19 NOTE — TELEPHONE ENCOUNTER
No care due was identified.  [facility] Embedded Care Due Messages. Reference number: [id number].   5/19/2024 10:49:22 AM CDT

## 2024-05-20 RX ORDER — LOSARTAN POTASSIUM AND HYDROCHLOROTHIAZIDE 12.5; 5 MG/1; MG/1
1 TABLET ORAL
Qty: 90 TABLET | Refills: 0 | Status: SHIPPED | OUTPATIENT
Start: 2024-05-20

## 2024-05-20 NOTE — TELEPHONE ENCOUNTER
Refill Routing Note   Medication(s) are not appropriate for processing by [facility] for the following reason(s):        Patient not seen by provider within 15 months  No active prescription written by provider    [facility] action(s):  Defer               Appointments  past 12m or future 3m with PCP    Date Provider   Last Visit   7/26/2021 Estiven Worthington MD   Next Visit   Visit date not found Estiven Worthington MD   ED visits in past 90 days: 0        Note composed:9:28 AM 05/20/2024

## 2024-08-02 ENCOUNTER — E-VISIT (OUTPATIENT)
Dept: PRIMARY CARE CLINIC | Facility: CLINIC | Age: 54
End: 2024-08-02
Payer: COMMERCIAL

## 2024-08-02 DIAGNOSIS — F41.9 ANXIETY: Primary | ICD-10-CM

## 2024-08-02 RX ORDER — CITALOPRAM 20 MG/1
20 TABLET, FILM COATED ORAL DAILY
Qty: 30 TABLET | Refills: 1 | Status: SHIPPED | OUTPATIENT
Start: 2024-08-02

## 2024-08-02 NOTE — PROGRESS NOTES
"  Patient ID: Lola Stevens Jr. is a 53 y.o. male.    Chief Complaint: Mood Disorder (Entered automatically based on patient selection in Meme.)    The patient initiated a request through [employer]e on 8/2/2024 for evaluation and management with a chief complaint of Mood Disorder (Entered automatically based on patient selection in Meme.)     I evaluated the questionnaire submission on 8/2/24.    [facility] Peq Evisit Anxiety/Depression    8/2/2024 12:33 PM CDT - Filed by Patient   Do you agree to participate in an E-Visit? Yes   If you have any of the following symptoms, please present to your local emergency room or call 911:  I acknowledge   Medication requests for narcotics will not be addressed via an E-Visit.  Please schedule an appointment. I acknowledge   What is the main issue you would like addressed today? Short temper, anxiety, mood swings.  I had been going through this for a while now. I was talking to my dad about it because he suffers from the same thing.  He talked me into reaching out to you.  He said Selexa really works for him.   Fear of embarrassment causes me to avoid doing things or speaking to people. No   I avoid activities in which I am the center of attention. No   Being embarrassed or looking stupid are among my worst fears. No   I would like to address: Medication for Anxiety or Depression   By selecting "I understand," you acknowledge that the answers that you provide for this questionnaire may not be immediately viewed by your provider or your care team. If you are personally dealing with suicidal thoughts or a crisis, please consider contacting your provider's office.  If your provider is not available, please consider taking action by: calling 911 or the National Suicide Prevention Lifeline any day, any time at 1-759.522.4344 or texting SIGNS to 957706 for 24/7 anonymous, free crisis counseling. I understand   Over the last 2 weeks, how often have you been bothered by the " following problems?   Little interest or pleasure in doing things Nearly every day   Feeling down, depressed, or hopeless Not at all   PHQ-2 Score (range: 0 - 6) 3 (Further screening recommended)   Feeling nervous, anxious, on edge Nearly everyday   Not being able to stop or control worrying Several days   Worrying too much about different things Several days   Trouble relaxing Several days   Being so restless that its hard to sit still Several days   Becoming easily annoyed or irritable Nearly everyday   Feeling afraid as if something awful might happen Not at all   If you marked you are experiencing any of the aforementioned problems, how difficult have these made it for you to do your work, take care of things at home, or get along with other people? Very difficult   TOTAL SCORE: (range: 0 - 21) 10   Do you want to address a new or existing medication? I would like to start a new medication that I do not already take   What is the name of the medication that you would like to start? Selexa   Have you taken a similar medication in the past? No   Why are you requesting this particular medication? Family member recommendatiom. (Dad)    What medical condition is the  medication intended to treat? Anxiety   Provide any additional information you feel is important. Im not depressed only have anxiety, mood swing and agitation   Please attach any relevant images or files    Are you able to take your vital signs? No         Encounter Diagnosis   Name Primary?    Anxiety Yes        No orders of the defined types were placed in this encounter.     Medications Ordered This Encounter   Medications    citalopram (CELEXA) 20 MG tablet     Sig: Take 1 tablet (20 mg total) by mouth once daily.     Dispense:  30 tablet     Refill:  1        No follow-ups on file.      E-Visit Time Tracking:    Day 1 Time (in minutes): 7    Total Time (in minutes): 7

## 2024-08-14 DIAGNOSIS — I10 BENIGN ESSENTIAL HTN: ICD-10-CM

## 2024-08-14 RX ORDER — LOSARTAN POTASSIUM AND HYDROCHLOROTHIAZIDE 12.5; 5 MG/1; MG/1
1 TABLET ORAL
Qty: 90 TABLET | Refills: 0 | Status: SHIPPED | OUTPATIENT
Start: 2024-08-14

## 2024-08-14 NOTE — TELEPHONE ENCOUNTER
Care Due:                  Date            Visit Type   Department     Provider  --------------------------------------------------------------------------------                                MYCHART                              ANNUAL                              CHECKUP/PHY  [facility]  Last Visit: 09-      S            PRIMARY CARE   [facility]  Next Visit: None Scheduled  None         None Found                                                            Last  Test          Frequency    Reason                     Performed    Due Date  --------------------------------------------------------------------------------    CMP.........  12 months..  atorvastatin,              09- 09-                             losartan-hydrochlorothiaz                             nancy......................    Lipid Panel.  12 months..  atorvastatin.............  09- 09-    [facility] Embedded Care Due Messages. Reference number: [id number].   8/14/2024 2:10:10 PM CDT

## 2024-08-14 NOTE — TELEPHONE ENCOUNTER
Refill Routing Note   Medication(s) are not appropriate for processing by [facility] for the following reason(s):        Patient not seen by provider within 15 months    [facility] action(s):  Defer     Requires labs : Yes             Appointments  past 12m or future 3m with PCP    Date Provider   Last Visit   8/2/2024 Estiven Worthington MD   Next Visit   Visit date not found Estiven Worthington MD   ED visits in past 90 days: 0        Note composed:5:22 PM 08/14/2024

## 2024-09-19 ENCOUNTER — PATIENT MESSAGE (OUTPATIENT)
Dept: PRIMARY CARE CLINIC | Facility: CLINIC | Age: 54
End: 2024-09-19
Payer: COMMERCIAL

## 2024-10-27 DIAGNOSIS — F41.9 ANXIETY: ICD-10-CM

## 2024-10-28 RX ORDER — CITALOPRAM 20 MG/1
20 TABLET, FILM COATED ORAL
Qty: 90 TABLET | Refills: 0 | Status: SHIPPED | OUTPATIENT
Start: 2024-10-28

## 2024-12-07 DIAGNOSIS — I10 BENIGN ESSENTIAL HTN: ICD-10-CM

## 2024-12-07 NOTE — TELEPHONE ENCOUNTER
No care due was identified.  [facility] Embedded Care Due Messages. Reference number: [id number].   12/07/2024 11:30:45 AM CST

## 2024-12-09 RX ORDER — LOSARTAN POTASSIUM AND HYDROCHLOROTHIAZIDE 12.5; 5 MG/1; MG/1
1 TABLET ORAL
Qty: 90 TABLET | Refills: 1 | Status: SHIPPED | OUTPATIENT
Start: 2024-12-09

## 2024-12-09 NOTE — TELEPHONE ENCOUNTER
Refill Routing Note   Medication(s) are not appropriate for processing by [facility] for the following reason(s):        Required vitals outdated  Patient not seen by provider within 15 months  Required labs outdated    [facility] action(s):  Defer             Appointments  past 12m or future 3m with PCP    Date Provider   Last Visit   8/2/2024 Estiven Worthington MD   Next Visit   Visit date not found Estiven Worthington MD   ED visits in past 90 days: 0        Note composed:10:51 AM 12/09/2024

## 2025-02-05 DIAGNOSIS — F41.9 ANXIETY: ICD-10-CM

## 2025-02-05 RX ORDER — CITALOPRAM 20 MG/1
20 TABLET, FILM COATED ORAL
Qty: 90 TABLET | Refills: 3 | Status: SHIPPED | OUTPATIENT
Start: 2025-02-05

## 2025-02-05 NOTE — TELEPHONE ENCOUNTER
Refill Routing Note   Medication(s) are not appropriate for processing by [facility] for the following reason(s):        Patient not seen by provider within 15 months    [facility] action(s):  Defer   Requires appointment : Yes   Requires labs : Yes      Medication Therapy Plan: LOV(8/2/24)      Appointments  past 12m or future 3m with PCP    Date Provider   Last Visit   8/2/2024 Estiven Worthington MD   Next Visit   Visit date not found Estiven Worthington MD   ED visits in past 90 days: 0        Note composed:11:48 AM 02/05/2025

## 2025-02-05 NOTE — TELEPHONE ENCOUNTER
Care Due:                  Date            Visit Type   Department     Provider  --------------------------------------------------------------------------------                                MYCHART                              ANNUAL                              CHECKUP/PHY  [facility]  Last Visit: 09-      S            PRIMARY CARE   [facility]  Next Visit: None Scheduled  None         None Found                                                            Last  Test          Frequency    Reason                     Performed    Due Date  --------------------------------------------------------------------------------    Office Visit  15 months..  atorvastatin, citalopram,   09- 11-                             losartan-hydrochlorothiaz                             nancy......................    CMP.........  12 months..  atorvastatin,              09- 09-                             losartan-hydrochlorothiaz                             nancy......................    Lipid Panel.  12 months..  atorvastatin.............  09- 09-    [facility] Embedded Care Due Messages. Reference number: [id number].   2/05/2025 12:18:14 AM CST

## 2025-03-15 DIAGNOSIS — E78.2 MIXED HYPERLIPIDEMIA: ICD-10-CM

## 2025-03-15 NOTE — TELEPHONE ENCOUNTER
No care due was identified.  [facility] Embedded Care Due Messages. Reference number: [id number].   3/15/2025 8:23:13 AM CDT

## 2025-03-17 RX ORDER — ATORVASTATIN CALCIUM 10 MG/1
10 TABLET, FILM COATED ORAL
Qty: 90 TABLET | Refills: 1 | Status: SHIPPED | OUTPATIENT
Start: 2025-03-17

## 2025-03-17 NOTE — TELEPHONE ENCOUNTER
Refill Routing Note   Medication(s) are not appropriate for processing by [facility] for the following reason(s):        Patient not seen by provider within 15 months  Required labs outdated    [facility] action(s):  Defer             Appointments  past 12m or future 3m with PCP    Date Provider   Last Visit   8/2/2024 Estiven Worthington MD   Next Visit   Visit date not found Estiven Worthington MD   ED visits in past 90 days: 0        Note composed:8:46 AM 03/17/2025

## 2025-07-25 ENCOUNTER — OFFICE VISIT (OUTPATIENT)
Dept: OTOLARYNGOLOGY | Facility: CLINIC | Age: 55
End: 2025-07-25
Payer: COMMERCIAL

## 2025-07-25 VITALS
HEIGHT: 70 IN | SYSTOLIC BLOOD PRESSURE: 125 MMHG | WEIGHT: 222.13 LBS | HEART RATE: 69 BPM | DIASTOLIC BLOOD PRESSURE: 84 MMHG | BODY MASS INDEX: 31.8 KG/M2

## 2025-07-25 DIAGNOSIS — J01.91 ACUTE RECURRENT SINUSITIS, UNSPECIFIED LOCATION: ICD-10-CM

## 2025-07-25 DIAGNOSIS — H83.3X2 NOISE-INDUCED HEARING LOSS OF LEFT EAR, UNSPECIFIED HEARING STATUS ON CONTRALATERAL SIDE: ICD-10-CM

## 2025-07-25 DIAGNOSIS — J32.9 CHRONIC SINUSITIS, UNSPECIFIED LOCATION: Primary | ICD-10-CM

## 2025-07-25 PROCEDURE — 99999 PR PBB SHADOW E&M-EST. PATIENT-LVL III: CPT | Mod: PBBFAC,,, | Performed by: OTOLARYNGOLOGY

## 2025-07-25 RX ORDER — AZELASTINE 1 MG/ML
2 SPRAY, METERED NASAL 2 TIMES DAILY PRN
Qty: 30 ML | Refills: 5 | Status: SHIPPED | OUTPATIENT
Start: 2025-07-25 | End: 2025-08-24

## 2025-07-25 RX ORDER — SOD CHLOR,BICARB/SQUEEZ BOTTLE
1 PACKET, WITH RINSE DEVICE NASAL 2 TIMES DAILY
Start: 2025-07-25

## 2025-07-25 RX ORDER — FLUTICASONE PROPIONATE 50 MCG
1 SPRAY, SUSPENSION (ML) NASAL 2 TIMES DAILY
Qty: 15.8 ML | Refills: 5 | Status: SHIPPED | OUTPATIENT
Start: 2025-07-25

## 2025-07-25 RX ORDER — PREDNISONE 10 MG/1
TABLET ORAL
Qty: 39 TABLET | Refills: 0 | Status: SHIPPED | OUTPATIENT
Start: 2025-07-25

## 2025-07-25 NOTE — PATIENT INSTRUCTIONS
CT sinuses for recurrent infection with chronic sinus symptoms.  A longer course of oral steroids (prednisone for a full week then tapering down from the 40 mg dose).  No indication for additional antibiotics.  Sinus rinses nasal steroids and as needed nasal antihistamines as outlined and prescribed.  Sinus balloon dilation or surgery maybe appropriate but we will be discussed in more detail on review of the CT scan at follow up in 3 or 4 weeks.      Hearing testing prior to follow up to assess the degree and nature of the left-sided hearing loss.  Likely to proceed with MRI of the brain and higher detail of the brainstem and inner ear once we review the hearing testing.      NASAL SALINE    Still saline comes in many preparations including sprays/mists, gels, and rinses.  Different preparations served different purposes.  Saline spray helps to briefly moisturize the nose and help clear mucus.  Saline gels coat the nose for longer protective benefit of keeping the linings the nose moist.  Saline rinses clear the nose and sinuses and a more thorough way in her best used for significant postnasal drip and sinus complaints.  A combination of saline sprays/mists, gels and rinses should be used to address routine nasal clearing and dryness issues as well as flushing for better control of allergy and postnasal drip symptoms.  There is no real risk of over use of nasal saline products.  Saline sprays do not have any of the potential rebound or addiction of nasal decongestant sprays.  Nasal saline sprays and rinses should be used prior to the application of any medicated nasal sprays such as nasal steroids or nasal antihistamine sprays.        INTRANASAL STEROID SPRAYS      Intranasal steroid sprays are available both by prescription and over-the-counter both in generic and brand name preparations.  They are all very similar in efficacy and side effect profiles.  Over-the-counter and prescription intranasal steroids  include fluticasone propionate (Flonase), fluticasone furoate (Sensimist), triamcinolone (Nasacort), and budesonide (Rhinocort).  While these medications are available as prescriptions as well there are few nasal steroids in her available by prescription only and include mometasone (Nasonex), flunisolide (Nasarel), and beclomethasone (QNASL).    Nasal steroids or the foundation of treatment of both allergy and other inflammatory conditions of the nose and sinuses.  They are safe for regular use and while there are many side effects listed most of these are steroid class effects and not typically encountered.  Typical side effects include dryness and even ulceration and bleeding of the nose.  These side effects can be minimized by proper application and proper moisturization with saline and saline gel.    Sometimes changing between 1 brand of nasal steroid and another can result in improved control of symptoms especially after long term use of one particular nasal steroid.    Proper application of the nasal steroid spray is accomplished by spraying towards the I/ear on the same side with the tip of the superior just barely inside the nostril with the chin slightly downward.  Any dripping should be gently inhaled not sniff test backwards into the throat.  Labeled instructions should be followed.        ASTELIN (Azelastine) nasal spray    Astelin is a topical nasal antihistamine which can be of additional benefit in controlling nasal allergy and postnasal drip.  Typically is recommended on an as-needed basis 1-2 sprays each nostril twice daily.  People often find it beneficial at night.  This typically added to her regimen of saline and nasal steroids as a 3rd line agent for as needed use.  Excessive use can cause excessive dryness and even nose bleeds.  It has a very strong taste which many people find intolerable.  Astelin needs to be stopped 5-7 days prior to any skin allergy testing just like oral antihistamines as  it will inhibit the skin response.      SINUS RINSE INSTRUCTIONS    Nasal Saline Irrigation Instructions  You can wash your nasal and sinus passages using nasal saline (salt water) irrigation. This   is simple and effective. Follow the instructions below, as well as the ones provided by your   physician.  Supplies  First, you will need a nasal saline irrigation bottle and rinse solution.   You can purchase nasal rinse kits that include these items (such as   NeilMed®, Ayr®, Simply Saline®, Ocean Complete®) at most drug   stores. You can also make your own saline irrigation solution by   adding kosher (non-iodine) salt and baking soda to distilled water.   Your physician may tell you to add medications like a steroid or   antibiotic to the rinse as needed.  Steps for nasal irrigation  Step 1. Fill the bottle  ? Wash your hands.  ? Fill the irrigation bottle with lukewarm distilled water or boiled water that has cooled.  Step 2. Mix the solution  ? Put the saline and salt packet contents into the bottle.  ? Tighten the top of the bottle and shake it gently to dissolve the mixture.  ? If you are making your own solution:   - Add 1/4 to 1/2 teaspoon of baking soda and 1/8 teaspoon of kosher (non-iodine) salt   into the bottle.   - Tighten the top of the bottle.   - Shake the bottle gently to dissolve the mixture.  Step 3. Get into position  ?  front of the sink.  ? Unless you were instructed to use another position, bend forward.   Then tilt your face down about 45 degrees so that you are looking   down into the sink.  ? Gently place the spout of the saline bottle against 1 of your nostrils.  [facility]  CARE AND TREATMENT  Patient Education  ©2018 [employer]  ©2018 [employer]  Step 4. Rinse  ? Breathing through your mouth, gently squeeze the   bottle. This will squirt the solution into your nostril. The   solution will start to drain from the other  nostril. Some   may drain from your mouth. This is normal.  ? Use 2 ounces (half of the bottle) on each nostril.  ? Afterwards, you may need to blow your nose gently to   help drain any solution that is left behind.  Step 5. Repeat  ? Repeat steps 3 and 4 with the other nostril.  You can watch a video to learn how to do nasal saline irrigation. Go to [email or web address] and   search for NeilMed Sinus Rinse.  Step 6.  Clean the bottle and cap. Air dry the Sinus Rinse bottle, cap, and tube on a clean paper towel, a lint free towel, or use NeilMed® NasaDOCK® or NasaDOCK plus (sold separately) to store the bottle, cap and tube.  Please read Warnings before using.  Our recommendation is to replace the bottle every three months.      NEILMED ALING INSTRUCTIONS    It is very important to keep these devices clean and free from any contamination. Replace the bottle every 3 months.  NasaDock Plus  NasaDock NeilMed® SINUS RINSE Squeeze Bottle: Please perform routine inspections of the bottle and tube for any discolorations and cracks. If there are any visual signs of deterioration or permanent color changes, please clean thoroughly. If the discolorations remain after cleansing, discard the items and purchase new ones. Please follow these instructions after each use of the product. Be sure to replace your product after three months.  Step 1: Rinse the cap, tube and bottle using running water. Fill the bottle with distilled, micro-filtered (through 0.2 micron), reverse osmosis filtered, commercially bottled or previously boiled and cooled down water at lukewarm or body temperature..  Step 2: Add a few drops of dish washing liquid or baby shampoo.  Step 3: Attach the cap and tube to the bottle; hold your finger over the opening in the cap and shake the bottle vigorously.  Step 4: Squeeze the bottle hard to allow the soapy solution to clean the interior of the tube and the cap. Empty out the bottle completely.  Step 5: Rinse the  soap from the bottle, cap and tube thoroughly and place the items on a clean paper towel to dry or use the preferred NasaDOCK® or NasaDOCK plus.    The NasaDOCK® is a simple, hygienic way to dry and store the SINUS RINSE bottle, cap and tube. NasaDOCK® comes with various hanging options and is available in different colors. Our newest model also offers storage for our SINUS RINSE mixture packets. We strongly suggest using NasaDOCK® as an inexpensive, easy way to dry the cap, tube and SINUS RINSE bottle.        Cleaning:  Do not use a  to clean the inside of a bottle. While our bottle is  safe, a  will not adequately clean the SINUS RINSE bottle. The water jets in dishwashers cannot enter the narrow neck of the bottle, and portions of the bottles interior will not be cleaned thoroughly. Additional methods of cleaning the bottle include the use of concentrated white vinegar or isopropyl alcohol (70% concentration), followed by scrubbing and rinsing as described above.       Microwave Disinfection  Clean the device with soap and water as mentioned above and shake off the excess water. Now place the bottle, cap and tube in the microwave for 40 seconds. This will disinfect the bottle, cap and tube. If the microwave has been used recently, please make sure that the inside of the microwave has cooled back down to room temperature before using it to disinfect the bottle.    NeilMed NasaFlo® Neti Pot Users:  Use the same procedure as above.    Sinugator® Cleaning Directions:  Clean the Sinugator® by running plain water and dry with a clean lint free towel and then air dry the unit by keeping it open to the air. The nasal  tip, blue reservoir and white soft tube can be disinfected by cleaning with soap and water and shaking off the excess water before placing in the home microwave for 60 seconds. Clean the entire unit with a few drops of dishwashing liquid and water every  three days to keep the unit clean. As a fi nal rinse to wash off any residual soap or tap water, use either distilled, micro-filtered (through 0.2 micron filter), commercially bottled or previously boiled & cooled down water. Please make sure to rinse thoroughly during each wash so no soap is left behind. DO NOT place the white motor unit in microwave for disinfection. Because of the units stainless steel components, this can cause damage or fire hazards.    General Principles of Maintenance & Storage:  When permissible use a microwave periodically to disinfect devices. Always store NeilMed® products in a cool and dry place with adequate ventilation. NasaDOCK® or NasaDOCK plus offer a simple hygienic way to air dry & neatly store the bottle, cap, tube and NasaFlo. Do not store the bottle with the cap screwed on, unless both are dry. Do not store the wet parts in a sealed plastic bag. If you travel before they are dry, wrap parts separately in paper towels. Hand soap or shampoo can be used for cleaning parts while away from home.        USE ONLY AS DIRECTED, IF SYMPTOMS PERSIST SEE YOUR DOCTOR/HEALTHCARE PROFESSIONAL. ALWAYS READ THE LABEL.

## 2025-07-25 NOTE — PROGRESS NOTES
[facility]    Subjective:      Patient: Lola Stevens Jr. Patient PCP: Estiven Worthington MD         :  1970     Sex:  male      MRN:  [id number]          Date of Visit: 2025      Chief Complaint: Ear Fullness      Patient ID: Lola Stevens Jr. is a 54 y.o. male     Patient is a former 15 pack year smoker quitting in  as well as a formal oral tobacco use or quitting in 2018 with no past medical history of ear disease trauma or surgery referred to me by Dr. Estiven Worthington in consultation for ear fullness affecting both ears.  No audiogram.  No pain or drainage.  No sudden hearing loss vertigo or tinnitus.    Patient also with complaints of headache pressure in the forehead temporal unless side of the face and head.  This all started with COVID infection years ago.  He had tingling of the face of the time but not the congestion and pressure.  He is going to longstanding left-sided hearing loss possibly associated with right shoulder shooting.  He has been told he has fluid in the ears at times.  He feels congestion and pressure but no actual nasal obstruction, purulence, loss of smell or taste or foul discolored drainage.  He gets transient improvement with IM steroids, short course steroids and antibiotics from urgent cares typically a week's duration.  Symptoms never fully resolve only to return.    No audiogram.      Last head imaging was MRI brain with and without contrast 2021 shows a deviated septum of the right side with no appreciable significant increased T2 signal or mucoperiosteal thickening of the paranasal sinuses, nasal cavity or middle ears or mastoids.  Limited visualization of the IAC contents and CPA are normal.    2021 MRI brain with and without contrast           Labs:  WBC   Date Value Ref Range Status   2023 6.45 3.90 - 12.70 K/uL Final     Hemoglobin   Date Value Ref Range Status   2023 15.2 14.0 - 18.0 g/dL Final     Platelets   Date  Value Ref Range Status   09/18/2023 230 150 - 450 K/uL Final     Creatinine   Date Value Ref Range Status   09/18/2023 1.1 0.5 - 1.4 mg/dL Final     TSH   Date Value Ref Range Status   12/08/2020 2.69 0.45 - 5.33 uIU/mL Final     Hemoglobin A1C   Date Value Ref Range Status   09/18/2023 5.3 4.0 - 5.6 % Final     Comment:     ADA Screening Guidelines:  5.7-6.4%  Consistent with prediabetes  >or=6.5%  Consistent with diabetes    High levels of fetal hemoglobin interfere with the HbA1C  assay. Heterozygous hemoglobin variants (HbS, HgC, etc)do  not significantly interfere with this assay.   However, presence of multiple variants may affect accuracy.         Past Medical History  He has a past medical history of Chronic pain, Diverticulosis, GERD (gastroesophageal reflux disease), Hemorrhoids, High blood pressure, and Lumbar radiculopathy.    Family / Surgical / Social History  His family history includes Cancer in his maternal grandmother; Diabetes in his paternal uncle; Hearing loss in his father; Heart disease in his father and paternal grandfather; Hypertension in his paternal grandfather; Mental illness in his mother and sister.    Past Surgical History:   Procedure Laterality Date    CHOLECYSTECTOMY      COLONOSCOPY      COLONOSCOPY N/A 10/17/2018    Procedure: COLONOSCOPY;  Surgeon: Cinda Davey MD;  Location: [address]);  Service: Endoscopy;  Laterality: N/A;    EPIDURAL STEROID INJECTION N/A 6/5/2020    Procedure: INJECTION, STEROID, EPIDURAL, L2-L3 need consent;  Surgeon: Albert Noble MD;  Location: [facility];  Service: Pain Management;  Laterality: N/A;    ESOPHAGOGASTRODUODENOSCOPY      ESOPHAGOGASTRODUODENOSCOPY N/A 10/17/2018    Procedure: EGD (ESOPHAGOGASTRODUODENOSCOPY);  Surgeon: Cinda Davey MD;  Location: [address]);  Service: Endoscopy;  Laterality: N/A;  Patient requested this date for transportation    FLEXIBLE CYSTOSCOPY N/A 7/28/2020    Procedure: CYSTOSCOPY,  FLEXIBLE;  Surgeon: Zulay Casillas MD;  Location: [facility] OR [facility];  Service: Urology;  Laterality: N/A;    FLUOROSCOPIC URODYNAMIC STUDY N/A 2020    Procedure: URODYNAMIC STUDY, FLUOROSCOPIC;  Surgeon: Zulay Casillas MD;  Location: [facility] OR 1ST FLR;  Service: Urology;  Laterality: N/A;  90 minutes    TRANSFORAMINAL EPIDURAL INJECTION OF STEROID Bilateral 10/25/2019    Procedure: Injection,steroid,epidural,transforaminal approach LUMBAR TRANSFORAMINAL BILATERAL L5 TF ALBIN;  Surgeon: Albert Noble MD;  Location: [facility];  Service: Pain Management;  Laterality: Bilateral;  NEEDS CONSENT    TRANSFORAMINAL EPIDURAL INJECTION OF STEROID Bilateral 2021    Procedure: LUMBAR TRANSFORAMINAL BILATERAL L5/S1 DIRECT REFERRAL;  Surgeon: Albert Noble MD;  Location: [facility];  Service: Pain Management;  Laterality: Bilateral;  NEEDS CONSENT    TRANSFORAMINAL EPIDURAL INJECTION OF STEROID Bilateral 2021    Procedure: LUMBAR TRANSFORAMINAL BILATERAL L5/S1 DIRECT REFERRAL NEEDS CONSENT;  Surgeon: Albert Noble MD;  Location: [facility];  Service: Pain Management;  Laterality: Bilateral;       Social History     Tobacco Use    Smoking status: Former     Current packs/day: 0.00     Average packs/day: 1 pack/day for 15.0 years (15.0 ttl pk-yrs)     Types: Cigarettes, Cigars     Start date: 10/17/1987     Quit date: 10/17/2002     Years since quittin.7    Smokeless tobacco: Former     Quit date: 2018    Tobacco comments:     quit smoking 15 years ago   Substance and Sexual Activity    Alcohol use: Yes     Alcohol/week: 24.0 standard drinks of alcohol     Types: 24 Cans of beer per week     Comment: on weekends    Drug use: Never    Sexual activity: Yes     Partners: Female     Birth control/protection: None     Comment: Wife had hysterectomy       Medications  He has a current medication list which includes the following prescription(s): atorvastatin, citalopram, and  "losartan-hydrochlorothiazide 50-12.5 mg.      Allergies  Review of patient's allergies indicates:  No Known Allergies    All medications, allergies, and past history have been reviewed.    Objective:      Vitals:      9/7/2023     3:26 PM 9/21/2023     2:57 PM 7/25/2025     1:31 PM   Vitals - 1 value per visit   SYSTOLIC 150 134 125   DIASTOLIC 92 82 84   Pulse 88 82 69   Temp 97.6 °F (36.4 °C) 97.5 °F (36.4 °C)    Resp 18 18    SPO2 97 % 97 %    Weight (lb) 220.9 220 222.11   Weight (kg) 100.2 99.791 100.75   Height 5' 10" (1.778 m) 5' 10" (1.778 m) 5' 10" (1.778 m)   BMI (Calculated) 31.7 31.6 31.9   Pain Score Zero Zero Five       Body surface area is 2.23 meters squared.    Physical Exam:    GENERAL  APPEARANCE -  alert, appears stated age, and cooperative  BARRIER(S) TO COMMUNICATION -  none VOICE - appropriate for age and gender    INTEGUMENTARY  no suspicious head and neck lesions    HEENT  HEAD: Normocephalic, without obvious abnormality, atraumatic  FACE: INSPECTION - Symmetric, no signs of trauma, no suspicious lesion(s)      STRENGTH - facial symmetry intact     PALPATION -  No masses     SALIVARY GLANDS - non-tender with no appreciable mass    NECK/THYROID: normal atraumatic, no neck masses, normal thyroid, no jvd    EYES  Normal occular alignment and mobility with no visible nystagmus at rest    EARS/NOSE/MOUTH/THROAT  EARS  PINNAE AND EXTERNAL EARS - no suspicious lesion OTOSCOPIC EXAM (surgical microscopy was not used for visualization/instrumentation): EAR EXAM - Normal ear canals, tympanic membranes and mobility, and middle ear spaces bilaterally.  HEARING - grossly intact to voice/finger rub    NOSE AND SINUSES  EXTERNAL NOSE - Grossly normal for age/sex  SEPTUM - high left deviation obscuring middle turbinate TURBINATES - within normal limits MUCOSA - minimal congestion     MOUTH AND THROAT   ORAL CAVITY, LIPS, TEETH, GUMS & TONGUE - moist, no suspicious lesions  OROPHARYNX /TONSILS/PHARYNGEAL " WALLS/HYPOPHARYNX - no erythema or exudates  NASOPHARYNX - limited mirror exam - unable to visualize due to anatomy/gag  LARYNX -  - limited mirror exam - unable to visualize due to anatomy/gag      CHEST AND LUNG   INSPECTION & AUSCULTATION - normal effort, no stridor    CARDIOVASCULAR  AUSCULTATION & PERIPHERAL VASCULAR - regular rate and rhythm.    NEUROLOGIC  MENTAL STATUS - alert, interactive CRANIAL NERVES - normal    LYMPHATIC  HEAD AND NECK - non-palpable; SUPRACLAVICULAR - deferred      Procedure(s):  None          Assessment:      Problem List Items Addressed This Visit    None  Visit Diagnoses         Chronic sinusitis, unspecified location    -  Primary      Acute recurrent sinusitis, unspecified location          Noise-induced hearing loss of left ear, unspecified hearing status on contralateral side                     Plan:      Prednisone 40 mg for a week then tapering down.  No indication for additional antibiotics.  Saline, nasal steroids and as needed nasal antihistamines.  CT scanning of the sinuses and audiogram prior to return in 4 weeks.  May need MRI depending upon hearing findings.    Patient/family understands the diagnosis and evaluation and treatment plan after discussing at length. All questions answered         Voice recognition software was used in the creation of this note/communication and any sound-alike errors which may have occurred from its use should be taken in context when interpreting.  If such errors prevent a clear understanding of the note/communication, please contact the office for clarification.

## 2025-07-29 ENCOUNTER — CLINICAL SUPPORT (OUTPATIENT)
Dept: AUDIOLOGY | Facility: CLINIC | Age: 55
End: 2025-07-29
Payer: COMMERCIAL

## 2025-07-29 DIAGNOSIS — H83.3X2 NOISE-INDUCED HEARING LOSS OF LEFT EAR, UNSPECIFIED HEARING STATUS ON CONTRALATERAL SIDE: ICD-10-CM

## 2025-07-29 DIAGNOSIS — H90.A32 MIXED CONDUCTIVE AND SENSORINEURAL HEARING LOSS OF LEFT EAR WITH RESTRICTED HEARING OF RIGHT EAR: Primary | ICD-10-CM

## 2025-07-29 PROCEDURE — 92567 TYMPANOMETRY: CPT | Mod: S$GLB,,, | Performed by: AUDIOLOGIST

## 2025-07-29 PROCEDURE — 99999 PR PBB SHADOW E&M-EST. PATIENT-LVL I: CPT | Mod: PBBFAC,,, | Performed by: AUDIOLOGIST

## 2025-07-29 PROCEDURE — 92557 COMPREHENSIVE HEARING TEST: CPT | Mod: S$GLB,,, | Performed by: AUDIOLOGIST

## 2025-07-29 NOTE — PROGRESS NOTES
Lola Stevens Jr., a 54 y.o. male, was seen today in the clinic for an audiologic evaluation.  The patient's main complaint was reduced hearing and aural fullness in the left ear.  Mr. Stevens reported that he has had severe aural fullness in his left ear since Feb 2025.  She noted a reduction in hearing in that ear.  He also reported left side tinnitus.  Pt denied otalgia and vertigo.    Tympanometry revealed Type C in the right ear and Type B in the left ear. Audiogram results revealed mild to severe sensorineural hearing loss (SNHL) in the right ear and mild to severe mixed hearing loss in the left ear.  Speech reception thresholds were noted at 25 dB in the right ear and 50 dB in the left ear.  Speech discrimination scores were 100% in the right ear and 100% in the left ear.    Recommendations:  Otologic evaluation  Repeat audiogram per ENT treatment plan  Hearing protection when in noise

## 2025-07-29 NOTE — Clinical Note
Tree Ruiz,  I saw your patient today for an audio.  A mixed component was noted in that problematic left ear. Mireya

## 2025-07-31 ENCOUNTER — PATIENT MESSAGE (OUTPATIENT)
Dept: OTOLARYNGOLOGY | Facility: CLINIC | Age: 55
End: 2025-07-31
Payer: COMMERCIAL

## 2025-08-01 NOTE — TELEPHONE ENCOUNTER
CT sinus reviewed.  Sinuses look perfectly clear.      There is mastoid effusion without coalescence on the right and middle ear effusion and mastoid effusion on the left again without coalescence.      Audiogram as below shows bilateral sensorineural hearing loss with a an overlying conductive hearing loss on the right side of 10 to 30 dB with a flat tympanogram.  Right ear shows a dampened AS tympanogram with negative pressure.  There is a 25 dB asymmetry in SRT on the left with normal 25 dB SRT right and 50 dB SRT left with 100% discrimination scores bilaterally.

## 2025-08-11 ENCOUNTER — PATIENT OUTREACH (OUTPATIENT)
Dept: ADMINISTRATIVE | Facility: HOSPITAL | Age: 55
End: 2025-08-11
Payer: COMMERCIAL

## 2025-08-11 VITALS — SYSTOLIC BLOOD PRESSURE: 125 MMHG | DIASTOLIC BLOOD PRESSURE: 84 MMHG

## (undated) DEVICE — DRESSING LEUKOPLAST FLEX 1X3IN